# Patient Record
Sex: MALE | Race: WHITE | NOT HISPANIC OR LATINO | Employment: OTHER | URBAN - METROPOLITAN AREA
[De-identification: names, ages, dates, MRNs, and addresses within clinical notes are randomized per-mention and may not be internally consistent; named-entity substitution may affect disease eponyms.]

---

## 2017-01-23 RX ORDER — TAMSULOSIN HYDROCHLORIDE 0.4 MG/1
0.4 CAPSULE ORAL DAILY
Qty: 90 CAPSULE | Refills: 3 | Status: SHIPPED | OUTPATIENT
Start: 2017-01-23 | End: 2017-10-18 | Stop reason: SDUPTHER

## 2017-01-23 NOTE — TELEPHONE ENCOUNTER
----- Message from Ava Jorge sent at 1/23/2017  9:24 AM CST -----  Contact: patient  Patient was here this morning and wanted to ask Dr. Quintanilla to write him a prescription for Flomax good for one year.  He's going to send it to his mail order pharmacy.    Please call him when the prescription is ready to  at [phone].      Thank you.

## 2017-01-23 NOTE — TELEPHONE ENCOUNTER
The pt has an appointment on 02/09/17. Please sign order. The pt is requesting we print the prescription and let him know when it is ready. Please sign order.

## 2017-03-10 ENCOUNTER — LAB VISIT (OUTPATIENT)
Dept: LAB | Facility: HOSPITAL | Age: 72
End: 2017-03-10
Attending: UROLOGY
Payer: MEDICARE

## 2017-03-10 DIAGNOSIS — R97.20 ELEVATED PROSTATE SPECIFIC ANTIGEN (PSA): ICD-10-CM

## 2017-03-10 LAB — COMPLEXED PSA SERPL-MCNC: 5.2 NG/ML

## 2017-03-10 PROCEDURE — 84153 ASSAY OF PSA TOTAL: CPT

## 2017-03-10 PROCEDURE — 36415 COLL VENOUS BLD VENIPUNCTURE: CPT | Mod: PO

## 2017-03-17 ENCOUNTER — OFFICE VISIT (OUTPATIENT)
Dept: UROLOGY | Facility: CLINIC | Age: 72
End: 2017-03-17
Payer: MEDICARE

## 2017-03-17 ENCOUNTER — APPOINTMENT (OUTPATIENT)
Dept: LAB | Facility: HOSPITAL | Age: 72
End: 2017-03-17
Attending: UROLOGY
Payer: MEDICARE

## 2017-03-17 VITALS
HEART RATE: 61 BPM | BODY MASS INDEX: 34.08 KG/M2 | SYSTOLIC BLOOD PRESSURE: 147 MMHG | TEMPERATURE: 98 F | RESPIRATION RATE: 18 BRPM | HEIGHT: 68 IN | DIASTOLIC BLOOD PRESSURE: 81 MMHG | WEIGHT: 224.88 LBS

## 2017-03-17 DIAGNOSIS — N40.0 BENIGN NON-NODULAR PROSTATIC HYPERPLASIA, PRESENCE OF LOWER URINARY TRACT SYMPTOMS UNSPECIFIED: ICD-10-CM

## 2017-03-17 DIAGNOSIS — R82.81 PYURIA: ICD-10-CM

## 2017-03-17 DIAGNOSIS — R31.29 MICROSCOPIC HEMATURIA: Primary | ICD-10-CM

## 2017-03-17 DIAGNOSIS — R97.20 ELEVATED PROSTATE SPECIFIC ANTIGEN (PSA): ICD-10-CM

## 2017-03-17 LAB
BILIRUB SERPL-MCNC: ABNORMAL MG/DL
BLOOD URINE, POC: ABNORMAL
COLOR, POC UA: YELLOW
GLUCOSE UR QL STRIP: ABNORMAL
KETONES UR QL STRIP: ABNORMAL
LEUKOCYTE ESTERASE URINE, POC: ABNORMAL
NITRITE, POC UA: ABNORMAL
PH, POC UA: 7
PROTEIN, POC: ABNORMAL
SPECIFIC GRAVITY, POC UA: 1
UROBILINOGEN, POC UA: ABNORMAL

## 2017-03-17 PROCEDURE — 81002 URINALYSIS NONAUTO W/O SCOPE: CPT | Mod: PBBFAC,PO | Performed by: UROLOGY

## 2017-03-17 PROCEDURE — 99999 PR PBB SHADOW E&M-EST. PATIENT-LVL III: CPT | Mod: PBBFAC,,, | Performed by: UROLOGY

## 2017-03-17 PROCEDURE — 99214 OFFICE O/P EST MOD 30 MIN: CPT | Mod: 25,S$PBB,, | Performed by: UROLOGY

## 2017-03-17 PROCEDURE — 87088 URINE BACTERIA CULTURE: CPT

## 2017-03-17 PROCEDURE — 88112 CYTOPATH CELL ENHANCE TECH: CPT | Performed by: PATHOLOGY

## 2017-03-17 PROCEDURE — 87086 URINE CULTURE/COLONY COUNT: CPT

## 2017-03-17 PROCEDURE — 87077 CULTURE AEROBIC IDENTIFY: CPT

## 2017-03-17 PROCEDURE — 81000 URINALYSIS NONAUTO W/SCOPE: CPT | Mod: PBBFAC,PO | Performed by: UROLOGY

## 2017-03-17 PROCEDURE — 88112 CYTOPATH CELL ENHANCE TECH: CPT | Mod: 26,,, | Performed by: PATHOLOGY

## 2017-03-17 PROCEDURE — 87186 SC STD MICRODIL/AGAR DIL: CPT

## 2017-03-17 PROCEDURE — 99213 OFFICE O/P EST LOW 20 MIN: CPT | Mod: PBBFAC,PO | Performed by: UROLOGY

## 2017-03-17 NOTE — PROGRESS NOTES
OFFICE NOTE    CHIEF COMPLAINT:  Elevated PSA, BPH.    HISTORY OF PRESENT ILLNESS:  This 71-year-old male returns for routine recheck.    He has a history of elevated PSA for which he had undergone a prostate   ultrasound and biopsy in [place] in 2002 where he was living at the time and   pathology report had revealed no evidence of any malignancy.  He has continued   to have elevated PSAs, but the patient does not want to undergo any further   prostate ultrasound and biopsies at this time.  His most recent PSA in fact was   stable at 5.2 on 03/10/2017 compared to 6.0 on 07/08/2016.  The patient also is   being treated for BPH with Flomax 0.4 mg p.o. daily with which he has been doing   well.  He did mention that he will be going on a trip next month and would like to take   some antibiotics with him in case he develops an infection. Otherwise, no other   change in his  history since his last visit of 08/17/2016.    MEDICAL HISTORY UPDATE:  Reveals that he underwent a colonoscopy six weeks ago   and the only finding was a benign polyp. He continues to be on Plavix.    PHYSICAL EXAMINATION:  ABDOMEN:  Soft, benign and nontender.  No masses.  No hernias or organomegaly.  EXTERNAL GENITALIA:  Normal phallus with adequate meatus.  Testes descended and   feel normal.  No scrotal masses.  RECTAL:  Reveals enlarged at least 40 g, smooth prostate.  No nodules.  Normal   sphincter tone.    UA did reveal 2+ leukocytes with 10 to 15 wbc's and trace of blood with 4 to 6   rbc's and pH 7.0.    His last PSA was 5.2 on 03/10/2017.    FINAL IMPRESSION:  Elevated PSA, BPH, pyuria, microscopic hematuria.    RECOMMENDATIONS:  Urine for C and S and cytology and subsequent treat with   appropriate antibiotic if infected and we will also give a prescription for   antibiotic to take with him on his trip.  Otherwise, continue on Flomax 0.4 mg   p.o. daily and will also plan on repeating the PSA in six months.      MD/ALEYDA  dd: 03/17/2017  13:07:19 (CDT)  td: 03/17/2017 19:24:05 (CHE)  Doc ID   #[id number]  Job ID #[id number]    CC:

## 2017-03-21 LAB — BACTERIA UR CULT: NORMAL

## 2017-03-23 RX ORDER — AMOXICILLIN AND CLAVULANATE POTASSIUM 875; 125 MG/1; MG/1
1 TABLET, FILM COATED ORAL 2 TIMES DAILY
Qty: 28 TABLET | Refills: 0 | Status: SHIPPED | OUTPATIENT
Start: 2017-03-23 | End: 2017-04-06

## 2017-03-23 RX ORDER — CIPROFLOXACIN 500 MG/1
500 TABLET ORAL 2 TIMES DAILY
Qty: 28 TABLET | Refills: 0 | Status: CANCELLED | OUTPATIENT
Start: 2017-03-23 | End: 2017-04-06

## 2017-08-28 ENCOUNTER — TELEPHONE (OUTPATIENT)
Dept: UROLOGY | Facility: CLINIC | Age: 72
End: 2017-08-28

## 2017-08-28 DIAGNOSIS — R97.20 ELEVATED PSA: Primary | ICD-10-CM

## 2017-08-28 NOTE — TELEPHONE ENCOUNTER
----- Message from Aga Sim sent at 8/28/2017  9:22 AM CDT -----  Contact: Self  Patient scheduled 6m f/u for 10/18/17 - would like to get PSA done prior to appointment - please call patient at [phone] to schedule

## 2017-08-28 NOTE — TELEPHONE ENCOUNTER
Call placed and spoke to patient's wife, stated that the patient is coming to the office to make his follow up appt. Informed her that the appt has been made and his PSA orders are in and he can do them at least the week before the appt. She will give him the message, she verbally understood.

## 2017-10-11 ENCOUNTER — LAB VISIT (OUTPATIENT)
Dept: LAB | Facility: HOSPITAL | Age: 72
End: 2017-10-11
Attending: UROLOGY
Payer: MEDICARE

## 2017-10-11 DIAGNOSIS — R97.20 ELEVATED PSA: ICD-10-CM

## 2017-10-11 LAB — COMPLEXED PSA SERPL-MCNC: 5.5 NG/ML

## 2017-10-11 PROCEDURE — 36415 COLL VENOUS BLD VENIPUNCTURE: CPT | Mod: PO

## 2017-10-11 PROCEDURE — 84153 ASSAY OF PSA TOTAL: CPT

## 2017-10-18 ENCOUNTER — APPOINTMENT (OUTPATIENT)
Dept: LAB | Facility: HOSPITAL | Age: 72
End: 2017-10-18
Attending: UROLOGY
Payer: MEDICARE

## 2017-10-18 ENCOUNTER — OFFICE VISIT (OUTPATIENT)
Dept: UROLOGY | Facility: CLINIC | Age: 72
End: 2017-10-18
Payer: MEDICARE

## 2017-10-18 VITALS
SYSTOLIC BLOOD PRESSURE: 157 MMHG | HEART RATE: 68 BPM | RESPIRATION RATE: 18 BRPM | HEIGHT: 68 IN | TEMPERATURE: 98 F | DIASTOLIC BLOOD PRESSURE: 95 MMHG | BODY MASS INDEX: 34.17 KG/M2 | WEIGHT: 225.5 LBS

## 2017-10-18 DIAGNOSIS — R31.29 MICROSCOPIC HEMATURIA: Primary | ICD-10-CM

## 2017-10-18 DIAGNOSIS — R31.0 GROSS HEMATURIA: ICD-10-CM

## 2017-10-18 DIAGNOSIS — R97.20 ELEVATED PROSTATE SPECIFIC ANTIGEN (PSA): ICD-10-CM

## 2017-10-18 DIAGNOSIS — N40.0 BENIGN PROSTATIC HYPERPLASIA WITHOUT LOWER URINARY TRACT SYMPTOMS: ICD-10-CM

## 2017-10-18 LAB
BILIRUB SERPL-MCNC: ABNORMAL MG/DL
BLOOD URINE, POC: ABNORMAL
COLOR, POC UA: YELLOW
GLUCOSE UR QL STRIP: ABNORMAL
KETONES UR QL STRIP: ABNORMAL
LEUKOCYTE ESTERASE URINE, POC: ABNORMAL
NITRITE, POC UA: ABNORMAL
PH, POC UA: 5
PROTEIN, POC: ABNORMAL
SPECIFIC GRAVITY, POC UA: 1.01
UROBILINOGEN, POC UA: ABNORMAL

## 2017-10-18 PROCEDURE — 81002 URINALYSIS NONAUTO W/O SCOPE: CPT | Mod: PBBFAC,PN | Performed by: UROLOGY

## 2017-10-18 PROCEDURE — 87088 URINE BACTERIA CULTURE: CPT

## 2017-10-18 PROCEDURE — 88112 CYTOPATH CELL ENHANCE TECH: CPT | Mod: 26,,, | Performed by: PATHOLOGY

## 2017-10-18 PROCEDURE — 99999 PR PBB SHADOW E&M-EST. PATIENT-LVL III: CPT | Mod: PBBFAC,,, | Performed by: UROLOGY

## 2017-10-18 PROCEDURE — 87086 URINE CULTURE/COLONY COUNT: CPT

## 2017-10-18 PROCEDURE — 99214 OFFICE O/P EST MOD 30 MIN: CPT | Mod: 25,S$PBB,, | Performed by: UROLOGY

## 2017-10-18 PROCEDURE — 87077 CULTURE AEROBIC IDENTIFY: CPT

## 2017-10-18 PROCEDURE — 81000 URINALYSIS NONAUTO W/SCOPE: CPT | Mod: PBBFAC,PN | Performed by: UROLOGY

## 2017-10-18 PROCEDURE — 88112 CYTOPATH CELL ENHANCE TECH: CPT | Performed by: PATHOLOGY

## 2017-10-18 PROCEDURE — 99213 OFFICE O/P EST LOW 20 MIN: CPT | Mod: PBBFAC,PN,25 | Performed by: UROLOGY

## 2017-10-18 PROCEDURE — 87186 SC STD MICRODIL/AGAR DIL: CPT

## 2017-10-18 RX ORDER — TAMSULOSIN HYDROCHLORIDE 0.4 MG/1
0.4 CAPSULE ORAL DAILY
Qty: 90 CAPSULE | Refills: 3 | Status: SHIPPED | OUTPATIENT
Start: 2017-10-18 | End: 2018-10-18

## 2017-10-18 NOTE — PROGRESS NOTES
OFFICE NOTE    CHIEF COMPLAINT:  Gross hematuria, elevated PSA, BPH.    HISTORY OF PRESENT ILLNESS:  This 72-year-old male presents with an   approximately 3-week history of experiencing episodes of gross hematuria with no   other associated symptoms.  He states that the gross hematuria has resolved   approximately a week ago.  He also has a history of elevated PSA for which he   had undergone ultrasound with biopsy in [place] in 2002 where he was living at   the time.  The PSA continued to be elevated, but they are more at a stable   level, and he had decided at his last visit that he does not want to undergo any   further prostate biopsies.  His most recent PSA was 5.5 on 10/11/2017 that   compares to 5.2 on 03/10/2017.  He also has a history of BPH for which he   continues to take Flomax 0.4 mg p.o. daily.  In terms of gross hematuria, we   discussed with the patient that he will probably require further evaluation of   both of the upper tract and cystoscopy.  He had undergone a CT scan of abdomen   and pelvis in November 2014, which revealed as the only finding was a left renal   cyst and no other significant findings.  It was mentioned to the patient that   we may need to repeat this again.    MEDICAL HISTORY UPDATE:  Reveals that he continues to be on Plavix.    PHYSICAL EXAMINATION:  ABDOMEN:  Soft, benign, and nontender.  No masses.  No hernias or organomegaly.  EXTERNAL GENITALIA:  Normal phallus with adequate meatus.  Testes descended and   feel normal.  No scrotal masses.  RECTAL:  Reveals enlarged, at least approximately 40 g smooth prostate.  No   nodules.  Normal sphincter tone.    UA does reveal 1+ blood, many rbc's and also many wbc's, pH 5.0.    FINAL IMPRESSION:  Gross microscopic hematuria, pyuria, elevated PSA, BPH.    RECOMMENDATIONS:  Urine for C and S and cytology.  We will treat with   appropriate antibiotic if infected.  Otherwise depending on the urine cytology,   we will proceed with the  CT scan and subsequent cystoscopy.  Otherwise, he will   continue on Flomax 0.4 mg p.o. daily.      CORIE  dd: 10/18/2017 10:47:48 (CDT)  td: 10/18/2017 23:50:39 (CDT)  Doc ID   #[id number]  Job ID #[id number]    CC:

## 2017-10-21 LAB — BACTERIA UR CULT: NORMAL

## 2017-10-22 RX ORDER — AMPICILLIN 500 MG/1
500 CAPSULE ORAL 4 TIMES DAILY
Qty: 56 CAPSULE | Refills: 0 | Status: SHIPPED | OUTPATIENT
Start: 2017-10-22 | End: 2019-08-23 | Stop reason: ALTCHOICE

## 2018-09-04 ENCOUNTER — LAB VISIT (OUTPATIENT)
Dept: LAB | Facility: HOSPITAL | Age: 73
End: 2018-09-04
Attending: UROLOGY
Payer: MEDICARE

## 2018-09-04 DIAGNOSIS — R97.20 ELEVATED PROSTATE SPECIFIC ANTIGEN (PSA): ICD-10-CM

## 2018-09-04 LAB — COMPLEXED PSA SERPL-MCNC: 5.3 NG/ML

## 2018-09-04 PROCEDURE — 36415 COLL VENOUS BLD VENIPUNCTURE: CPT | Mod: PO

## 2018-09-04 PROCEDURE — 84153 ASSAY OF PSA TOTAL: CPT

## 2018-09-07 ENCOUNTER — OFFICE VISIT (OUTPATIENT)
Dept: UROLOGY | Facility: CLINIC | Age: 73
End: 2018-09-07
Payer: MEDICARE

## 2018-09-07 VITALS
DIASTOLIC BLOOD PRESSURE: 93 MMHG | WEIGHT: 223.31 LBS | SYSTOLIC BLOOD PRESSURE: 152 MMHG | HEIGHT: 67 IN | HEART RATE: 55 BPM | BODY MASS INDEX: 35.05 KG/M2

## 2018-09-07 DIAGNOSIS — R97.20 ELEVATED PSA: ICD-10-CM

## 2018-09-07 DIAGNOSIS — R35.0 URINARY FREQUENCY: Primary | ICD-10-CM

## 2018-09-07 DIAGNOSIS — N40.0 BENIGN PROSTATIC HYPERPLASIA WITHOUT LOWER URINARY TRACT SYMPTOMS: ICD-10-CM

## 2018-09-07 DIAGNOSIS — R31.0 GROSS HEMATURIA: ICD-10-CM

## 2018-09-07 LAB
BILIRUB SERPL-MCNC: ABNORMAL MG/DL
BLOOD URINE, POC: ABNORMAL
COLOR, POC UA: ABNORMAL
GLUCOSE UR QL STRIP: ABNORMAL
KETONES UR QL STRIP: ABNORMAL
LEUKOCYTE ESTERASE URINE, POC: ABNORMAL
NITRITE, POC UA: ABNORMAL
PH, POC UA: 6
PROTEIN, POC: ABNORMAL
SPECIFIC GRAVITY, POC UA: 1.01
UROBILINOGEN, POC UA: ABNORMAL

## 2018-09-07 PROCEDURE — 81000 URINALYSIS NONAUTO W/SCOPE: CPT | Mod: PBBFAC,PN | Performed by: UROLOGY

## 2018-09-07 PROCEDURE — 99999 PR PBB SHADOW E&M-EST. PATIENT-LVL III: CPT | Mod: PBBFAC,,, | Performed by: UROLOGY

## 2018-09-07 PROCEDURE — 99214 OFFICE O/P EST MOD 30 MIN: CPT | Mod: S$PBB,,, | Performed by: UROLOGY

## 2018-09-07 PROCEDURE — 81002 URINALYSIS NONAUTO W/O SCOPE: CPT | Mod: PBBFAC,PN | Performed by: UROLOGY

## 2018-09-07 PROCEDURE — 99213 OFFICE O/P EST LOW 20 MIN: CPT | Mod: PBBFAC,PN | Performed by: UROLOGY

## 2018-09-07 RX ORDER — ZOSTER VACCINE RECOMBINANT, ADJUVANTED 50 MCG/0.5
KIT INTRAMUSCULAR
Refills: 0 | COMMUNITY
Start: 2018-08-28 | End: 2021-05-06 | Stop reason: ALTCHOICE

## 2018-09-07 RX ORDER — TAMSULOSIN HYDROCHLORIDE 0.4 MG/1
0.4 CAPSULE ORAL DAILY
Qty: 90 CAPSULE | Refills: 3 | Status: SHIPPED | OUTPATIENT
Start: 2018-09-07 | End: 2018-12-02 | Stop reason: SDUPTHER

## 2018-09-07 NOTE — PROGRESS NOTES
OFFICE NOTE    CHIEF COMPLAINT:  History of hematuria, elevated PSA, and BPH.    HISTORY OF PRESENT ILLNESS:  This 73-year-old male returns for routine recheck.    He has a history of hematuria for which he underwent a workup that included CT   scan in 2014 and cystoscopy in 2015, which revealed no significant abnormal   findings only that of some BPH.  He also has history of elevated PSA for which   he underwent prostate biopsy in 2002.  He has been noted to have continued   abnormal PSA since then, but he had stated on prior visits and also on today's   visit that he does not want to undergo any more prostate biopsies.  He also has   a history of BPH for which he continues to take Flomax 0.4 mg p.o. daily, which   continues to be effective, of which he needs a refill.    MEDICAL HISTORY UPDATE:  Reveals no other change in general health.  He   continues to be on Plavix.    PHYSICAL EXAMINATION:  ABDOMEN:  Soft, benign, and nontender.  No masses.  No hernias, no organomegaly.  EXTERNAL GENITALIA:  Normal phallus with adequate meatus.  Testes descended and   feel normal.  No scrotal masses.  RECTAL:  Reveals a large, approximately 40 g smooth prostate.  No nodules.    Normal sphincter tone.    UA negative with pH 6.0.    His last PSA was 5.3 on 09/04/2018.    FINAL IMPRESSION:  History of hematuria, elevated prostate-specific antigen, and   benign prostatic hypertrophy.    RECOMMENDATIONS:  Continue on Flomax 0.4 mg p.o. daily.  Otherwise, recheck in   six months.      CORIE  dd: 09/07/2018 15:37:09 (CDT)  td: 09/08/2018 08:27:24 (CHE)  Doc ID   #[id number]  Job ID #[id number]    CC:

## 2018-12-02 RX ORDER — TAMSULOSIN HYDROCHLORIDE 0.4 MG/1
CAPSULE ORAL
Qty: 90 CAPSULE | Refills: 0 | Status: SHIPPED | OUTPATIENT
Start: 2018-12-02 | End: 2019-11-04 | Stop reason: SDUPTHER

## 2019-07-05 ENCOUNTER — CLINICAL SUPPORT (OUTPATIENT)
Dept: UROLOGY | Facility: CLINIC | Age: 74
End: 2019-07-05
Payer: MEDICARE

## 2019-07-05 ENCOUNTER — TELEPHONE (OUTPATIENT)
Dept: UROLOGY | Facility: CLINIC | Age: 74
End: 2019-07-05

## 2019-07-05 ENCOUNTER — APPOINTMENT (OUTPATIENT)
Dept: LAB | Facility: HOSPITAL | Age: 74
End: 2019-07-05
Attending: NURSE PRACTITIONER
Payer: MEDICARE

## 2019-07-05 DIAGNOSIS — R31.0 GROSS HEMATURIA: ICD-10-CM

## 2019-07-05 PROCEDURE — 88112 CYTOLOGY SPECIMEN-URINE: ICD-10-PCS | Mod: 26,,, | Performed by: PATHOLOGY

## 2019-07-05 PROCEDURE — 87086 URINE CULTURE/COLONY COUNT: CPT

## 2019-07-05 PROCEDURE — 88112 CYTOPATH CELL ENHANCE TECH: CPT | Performed by: PATHOLOGY

## 2019-07-05 PROCEDURE — 99499 NO LOS: ICD-10-PCS | Mod: S$PBB,,, | Performed by: NURSE PRACTITIONER

## 2019-07-05 PROCEDURE — 99499 UNLISTED E&M SERVICE: CPT | Mod: S$PBB,,, | Performed by: NURSE PRACTITIONER

## 2019-07-05 PROCEDURE — 88112 CYTOPATH CELL ENHANCE TECH: CPT | Mod: 26,,, | Performed by: PATHOLOGY

## 2019-07-05 RX ORDER — CIPROFLOXACIN 500 MG/1
500 TABLET ORAL 2 TIMES DAILY
Qty: 28 TABLET | Refills: 0 | Status: SHIPPED | OUTPATIENT
Start: 2019-07-05 | End: 2019-07-19

## 2019-07-05 RX ORDER — PHENAZOPYRIDINE HYDROCHLORIDE 200 MG/1
200 TABLET, FILM COATED ORAL 3 TIMES DAILY PRN
Qty: 30 TABLET | Refills: 1 | Status: SHIPPED | OUTPATIENT
Start: 2019-07-05 | End: 2019-07-05 | Stop reason: SDUPTHER

## 2019-07-05 RX ORDER — PHENAZOPYRIDINE HYDROCHLORIDE 200 MG/1
200 TABLET, FILM COATED ORAL 3 TIMES DAILY PRN
Qty: 30 TABLET | Refills: 1 | Status: SHIPPED | OUTPATIENT
Start: 2019-07-05 | End: 2019-07-15

## 2019-07-05 RX ORDER — CIPROFLOXACIN 500 MG/1
500 TABLET ORAL 2 TIMES DAILY
Qty: 28 TABLET | Refills: 0 | Status: SHIPPED | OUTPATIENT
Start: 2019-07-05 | End: 2019-07-05 | Stop reason: SDUPTHER

## 2019-07-05 NOTE — PROGRESS NOTES
Per betty Monroy patient in clinic today to give a urine sample. Urine sent for urine culture and urine cytology.

## 2019-07-05 NOTE — TELEPHONE ENCOUNTER
Pt called our office this afternoon c/o dysuria, bladder pain and gross hematuria.    Pt will come into clinic today to drop off urine for   Urine culture  Urine Cytology    CT Urogram with and without contrast to be scheduled as well as an appt. With Dr. Quintanilla for further workup.    Cipro and Pyridium were sent to his pharmacy to begin today.

## 2019-07-05 NOTE — TELEPHONE ENCOUNTER
----- Message from Amada Gibson sent at 7/5/2019 12:54 PM CDT -----  Contact: self  Type: Needs Medical Advice    Who Called:  self  Symptoms (please be specific):  Pain and blood when urinating  How long has patient had these symptoms: today  Pharmacy name and phone #:  [facility]  Best Call Back Number: [phone] (home)   Additional Information: patient would like a prescription called in. Please call patient to advise. Thanks!   [id number] - [place] - [address]  [facility]  [address]  [place] [address]  Phone: [phone] Fax: [phone]

## 2019-07-05 NOTE — TELEPHONE ENCOUNTER
Spoke with patient he will come by the office today to give a urine sample. Ct scheduled for Tuesday 7/9 at 9:30am. Please call patient to schedule follow up with

## 2019-07-07 LAB
BACTERIA UR CULT: NORMAL
BACTERIA UR CULT: NORMAL

## 2019-07-09 ENCOUNTER — HOSPITAL ENCOUNTER (OUTPATIENT)
Dept: RADIOLOGY | Facility: HOSPITAL | Age: 74
Discharge: HOME | End: 2019-07-09
Attending: NURSE PRACTITIONER
Payer: MEDICARE

## 2019-07-09 DIAGNOSIS — R31.0 GROSS HEMATURIA: ICD-10-CM

## 2019-07-09 PROCEDURE — 74178 CT UROGRAM ABD PELVIS W WO: ICD-10-PCS | Mod: 26,,, | Performed by: RADIOLOGY

## 2019-07-09 PROCEDURE — 25500020 PHARM REV CODE 255: Performed by: NURSE PRACTITIONER

## 2019-07-09 PROCEDURE — 74178 CT ABD&PLV WO CNTR FLWD CNTR: CPT | Mod: 26,,, | Performed by: RADIOLOGY

## 2019-07-09 PROCEDURE — 74178 CT ABD&PLV WO CNTR FLWD CNTR: CPT | Mod: TC

## 2019-07-09 RX ADMIN — IOHEXOL 125 ML: 350 INJECTION, SOLUTION INTRAVENOUS at 11:07

## 2019-07-10 ENCOUNTER — TELEPHONE (OUTPATIENT)
Dept: UROLOGY | Facility: CLINIC | Age: 74
End: 2019-07-10

## 2019-07-10 DIAGNOSIS — N40.0 BENIGN PROSTATIC HYPERPLASIA WITHOUT LOWER URINARY TRACT SYMPTOMS: Primary | ICD-10-CM

## 2019-07-10 NOTE — TELEPHONE ENCOUNTER
----- Message from SABINA Rowan sent at 7/9/2019  2:04 PM CDT -----  Pt to be scheduled with Dr. Quintanilla for further treatment with cysto, trus with bx's: Enlarged prostate with bladder outlet obstruction.  Pt walked into clinic last week with gross hematuria.

## 2019-07-10 NOTE — TELEPHONE ENCOUNTER
Spoke with patient, CT results given with recommendation, patient states he has been having an enlarged prostate for over 20 years. He just would like a follow up appt to see the MD with PSA prior to discuss. appt made, and informed to do lab work the week before appt, patient verbally understood.

## 2019-08-19 ENCOUNTER — LAB VISIT (OUTPATIENT)
Dept: LAB | Facility: HOSPITAL | Age: 74
End: 2019-08-19
Attending: UROLOGY
Payer: MEDICARE

## 2019-08-19 DIAGNOSIS — N40.0 BENIGN PROSTATIC HYPERPLASIA WITHOUT LOWER URINARY TRACT SYMPTOMS: ICD-10-CM

## 2019-08-19 LAB — COMPLEXED PSA SERPL-MCNC: 4.7 NG/ML (ref 0–4)

## 2019-08-19 PROCEDURE — 84153 ASSAY OF PSA TOTAL: CPT

## 2019-08-19 PROCEDURE — 36415 COLL VENOUS BLD VENIPUNCTURE: CPT

## 2019-08-23 ENCOUNTER — OFFICE VISIT (OUTPATIENT)
Dept: UROLOGY | Facility: CLINIC | Age: 74
End: 2019-08-23
Payer: MEDICARE

## 2019-08-23 VITALS
DIASTOLIC BLOOD PRESSURE: 85 MMHG | HEART RATE: 62 BPM | RESPIRATION RATE: 18 BRPM | SYSTOLIC BLOOD PRESSURE: 154 MMHG | BODY MASS INDEX: 35.47 KG/M2 | TEMPERATURE: 98 F | WEIGHT: 226 LBS | HEIGHT: 67 IN

## 2019-08-23 DIAGNOSIS — R31.29 MICROSCOPIC HEMATURIA: Primary | ICD-10-CM

## 2019-08-23 DIAGNOSIS — N40.0 BENIGN PROSTATIC HYPERPLASIA WITHOUT LOWER URINARY TRACT SYMPTOMS: ICD-10-CM

## 2019-08-23 DIAGNOSIS — R97.20 ELEVATED PSA: ICD-10-CM

## 2019-08-23 LAB
BILIRUB SERPL-MCNC: ABNORMAL MG/DL
BLOOD URINE, POC: ABNORMAL
COLOR, POC UA: YELLOW
GLUCOSE UR QL STRIP: ABNORMAL
KETONES UR QL STRIP: ABNORMAL
LEUKOCYTE ESTERASE URINE, POC: ABNORMAL
NITRITE, POC UA: ABNORMAL
PH, POC UA: 7.5
PROTEIN, POC: ABNORMAL
SPECIFIC GRAVITY, POC UA: 1.01
UROBILINOGEN, POC UA: ABNORMAL

## 2019-08-23 PROCEDURE — 99999 PR PBB SHADOW E&M-EST. PATIENT-LVL III: ICD-10-PCS | Mod: PBBFAC,,, | Performed by: UROLOGY

## 2019-08-23 PROCEDURE — 99999 PR PBB SHADOW E&M-EST. PATIENT-LVL III: CPT | Mod: PBBFAC,,, | Performed by: UROLOGY

## 2019-08-23 PROCEDURE — 81002 URINALYSIS NONAUTO W/O SCOPE: CPT | Mod: PBBFAC,PN | Performed by: UROLOGY

## 2019-08-23 PROCEDURE — 99214 PR OFFICE/OUTPT VISIT, EST, LEVL IV, 30-39 MIN: ICD-10-PCS | Mod: 25,S$PBB,, | Performed by: UROLOGY

## 2019-08-23 PROCEDURE — 99213 OFFICE O/P EST LOW 20 MIN: CPT | Mod: PBBFAC,PN | Performed by: UROLOGY

## 2019-08-23 PROCEDURE — 81000 URINALYSIS NONAUTO W/SCOPE: CPT | Mod: PBBFAC,PN | Performed by: UROLOGY

## 2019-08-23 PROCEDURE — 99214 OFFICE O/P EST MOD 30 MIN: CPT | Mod: 25,S$PBB,, | Performed by: UROLOGY

## 2019-08-23 NOTE — PROGRESS NOTES
OFFICE NOTE  [unfilled]  [id number]  8/23/2019       CHIEF COMPLAINT:   History of hematuria, elevated PSA, BPH     HISTORY OF PRESENT ILLNESS:   This 74-year-old male returns for recheck.  He has a history of hematuria for which he underwent a workup that included a CT scan in 2014 and cystoscopy in 2015 which revealed no significant abnormalities other than some BPH.  He also has a history of elevated PSA for which he underwent prostate ultrasound biopsy in 2002 in [place] where he was living at the time.  He has continued to have abnormal PSA levels that have been fluctuating but he had stated on prior visit that he did not  want to have any further prostate biopsies.  He continues to take Flomax 0.4 g p.o. q.d. for management of his BPH.  He did undergo a CT scan on 07/09/2019 for evaluation of hematuria which revealed enlarged prostate and a simple appearing left renal cyst which has decreased in size compared to the prior studies and some cortical scarring of the right kidney and no obvious source for hematuria.    Medical history  reveals no change in his general health since his last visit of 09/07/2018.  He does continue to take Plavix.    Physical exam:  Abdomen - soft benign nontender no masses no hernias no organomegaly                             External genitalia - normal phallus with adequate meatus testes descended and feel normal no scrotal masses                             Rectal - enlarged 40 g smooth prostate no nodules normal sphincter tone     PSA - 4.7 on 08/19/2019 having come down from 5.3 on 09/04/2018     UA - trace of blood small amount of WBCs pH 7.5     FINAL IMPRESSION:  History hematuria, BPH, pyuria       RECOMMENDATIONS:  Urine for culture and cytology and will contact patient with results.  Continue on Flomax 0.4 mg p.o. Q.d..

## 2019-11-05 RX ORDER — TAMSULOSIN HYDROCHLORIDE 0.4 MG/1
CAPSULE ORAL
Qty: 90 CAPSULE | Refills: 0 | Status: SHIPPED | OUTPATIENT
Start: 2019-11-05 | End: 2019-11-05

## 2019-11-05 RX ORDER — TAMSULOSIN HYDROCHLORIDE 0.4 MG/1
1 CAPSULE ORAL DAILY
Qty: 90 CAPSULE | Refills: 3 | Status: SHIPPED | OUTPATIENT
Start: 2019-11-05 | End: 2020-11-10 | Stop reason: SDUPTHER

## 2020-10-05 DIAGNOSIS — R97.20 ELEVATED PSA: Primary | ICD-10-CM

## 2020-11-03 ENCOUNTER — LAB VISIT (OUTPATIENT)
Dept: LAB | Facility: HOSPITAL | Age: 75
End: 2020-11-03
Attending: UROLOGY
Payer: MEDICARE

## 2020-11-03 DIAGNOSIS — R97.20 ELEVATED PSA: ICD-10-CM

## 2020-11-03 LAB — COMPLEXED PSA SERPL-MCNC: 5.3 NG/ML (ref 0–4)

## 2020-11-03 PROCEDURE — 36415 COLL VENOUS BLD VENIPUNCTURE: CPT

## 2020-11-03 PROCEDURE — 84153 ASSAY OF PSA TOTAL: CPT

## 2020-11-10 ENCOUNTER — OFFICE VISIT (OUTPATIENT)
Dept: UROLOGY | Facility: CLINIC | Age: 75
End: 2020-11-10
Payer: MEDICARE

## 2020-11-10 VITALS
WEIGHT: 226 LBS | DIASTOLIC BLOOD PRESSURE: 86 MMHG | RESPIRATION RATE: 18 BRPM | HEART RATE: 66 BPM | BODY MASS INDEX: 35.47 KG/M2 | HEIGHT: 67 IN | TEMPERATURE: 98 F | SYSTOLIC BLOOD PRESSURE: 157 MMHG

## 2020-11-10 DIAGNOSIS — N40.0 BENIGN PROSTATIC HYPERPLASIA WITHOUT LOWER URINARY TRACT SYMPTOMS: ICD-10-CM

## 2020-11-10 DIAGNOSIS — R97.20 ELEVATED PSA: ICD-10-CM

## 2020-11-10 DIAGNOSIS — R31.29 MICROSCOPIC HEMATURIA: Primary | ICD-10-CM

## 2020-11-10 PROCEDURE — 87086 URINE CULTURE/COLONY COUNT: CPT

## 2020-11-10 PROCEDURE — 87186 SC STD MICRODIL/AGAR DIL: CPT

## 2020-11-10 PROCEDURE — 81000 URINALYSIS NONAUTO W/SCOPE: CPT | Mod: PBBFAC,PN | Performed by: UROLOGY

## 2020-11-10 PROCEDURE — 99215 OFFICE O/P EST HI 40 MIN: CPT | Mod: 25,S$PBB,, | Performed by: UROLOGY

## 2020-11-10 PROCEDURE — 87088 URINE BACTERIA CULTURE: CPT

## 2020-11-10 PROCEDURE — 99214 OFFICE O/P EST MOD 30 MIN: CPT | Mod: PBBFAC,PN,25 | Performed by: UROLOGY

## 2020-11-10 PROCEDURE — 88112 CYTOPATH CELL ENHANCE TECH: CPT | Performed by: PATHOLOGY

## 2020-11-10 PROCEDURE — 88112 CYTOPATH CELL ENHANCE TECH: CPT | Mod: 26,,, | Performed by: PATHOLOGY

## 2020-11-10 PROCEDURE — 99999 PR PBB SHADOW E&M-EST. PATIENT-LVL IV: CPT | Mod: PBBFAC,,, | Performed by: UROLOGY

## 2020-11-10 PROCEDURE — 87077 CULTURE AEROBIC IDENTIFY: CPT

## 2020-11-10 PROCEDURE — 88112 PR  CYTOPATH, CELL ENHANCE TECH: ICD-10-PCS | Mod: 26,,, | Performed by: PATHOLOGY

## 2020-11-10 PROCEDURE — 99999 PR PBB SHADOW E&M-EST. PATIENT-LVL IV: ICD-10-PCS | Mod: PBBFAC,,, | Performed by: UROLOGY

## 2020-11-10 PROCEDURE — 99215 PR OFFICE/OUTPT VISIT, EST, LEVL V, 40-54 MIN: ICD-10-PCS | Mod: 25,S$PBB,, | Performed by: UROLOGY

## 2020-11-10 RX ORDER — INFLUENZA A VIRUS A/MICHIGAN/45/2015 X-275 (H1N1) ANTIGEN (FORMALDEHYDE INACTIVATED), INFLUENZA A VIRUS A/SINGAPORE/INFIMH-16-0019/2016 IVR-186 (H3N2) ANTIGEN (FORMALDEHYDE INACTIVATED), INFLUENZA B VIRUS B/PHUKET/3073/2013 ANTIGEN (FORMALDEHYDE INACTIVATED), AND INFLUENZA B VIRUS B/MARYLAND/15/2016 BX-69A ANTIGEN (FORMALDEHYDE INACTIVATED) 60; 60; 60; 60 UG/.7ML; UG/.7ML; UG/.7ML; UG/.7ML
INJECTION, SUSPENSION INTRAMUSCULAR
COMMUNITY
Start: 2020-09-16 | End: 2021-05-06 | Stop reason: CLARIF

## 2020-11-10 RX ORDER — TAMSULOSIN HYDROCHLORIDE 0.4 MG/1
1 CAPSULE ORAL DAILY
Qty: 90 CAPSULE | Refills: 3 | Status: SHIPPED | OUTPATIENT
Start: 2020-11-10 | End: 2021-05-18 | Stop reason: SDUPTHER

## 2020-11-10 NOTE — PROGRESS NOTES
OFFICE NOTE  [unfilled]  [id number]  11/10/2020       CHIEF COMPLAINT:   history microscopic hematuria, elevated PSA, BPH     HISTORY OF PRESENT ILLNESS:   this 75-year-old male returns routine recheck.  Has history of hematuria for which he underwent a full workup that included a CT scan 2014 and again in on 07/09/2019 and cystoscopy 2015 with no significant  abnormal findings other than some BPH.  He also has a history elevated PSA for which he underwent a prostate ultrasound biopsy in 2002 in [place] where was living at the time.  He has continued to have elevated PSA that has been fluctuating and last one was 5.3 on 11/03/2020 but he has mentioned he does not want to undergo any further prostate biopsies.    Other changes in his general health    Physical exam:  Abdomen - soft benign nontender no masses no hernias no organomegaly                             External genitalia - normal phallus with adequate meatus testes descended and feel normal no scrotal mass                             Rectal -  enlarged 40 g smooth prostate no nodules normal sphincter tone         PSA  - 5.3 on 11/03/2020     UA - large amount of blood with pH 6.0     FINAL IMPRESSION:  Microscopic hematuria, elevated PSA, BPH       RECOMMENDATIONS:   urine for culture and cytology and will contact patient with results.  Continue on Flomax 0.4 mg p.o. q.d.

## 2020-11-12 LAB
BACTERIA UR CULT: ABNORMAL
FINAL PATHOLOGIC DIAGNOSIS: NORMAL

## 2020-11-13 ENCOUNTER — TELEPHONE (OUTPATIENT)
Dept: UROLOGY | Facility: CLINIC | Age: 75
End: 2020-11-13

## 2020-11-13 RX ORDER — NITROFURANTOIN 25; 75 MG/1; MG/1
100 CAPSULE ORAL 2 TIMES DAILY
Qty: 20 CAPSULE | Refills: 0 | Status: SHIPPED | OUTPATIENT
Start: 2020-11-13 | End: 2021-05-06 | Stop reason: CLARIF

## 2020-11-13 NOTE — TELEPHONE ENCOUNTER
----- Message from Mirella Gates sent at 11/13/2020 12:36 PM CST -----  Regarding: advice  Contact: pt  Patient called and asked if you will take the [facility] pharmacy off his chart he no longer   Uses that pharmacy   All his meds need to [id number] - [place], [place] - [address] AT  [facility];      Call back [phone]

## 2021-01-12 ENCOUNTER — OFFICE VISIT (OUTPATIENT)
Dept: UROLOGY | Facility: CLINIC | Age: 76
End: 2021-01-12
Payer: MEDICARE

## 2021-01-12 VITALS
BODY MASS INDEX: 35.47 KG/M2 | WEIGHT: 226 LBS | HEART RATE: 61 BPM | RESPIRATION RATE: 18 BRPM | SYSTOLIC BLOOD PRESSURE: 157 MMHG | HEIGHT: 67 IN | DIASTOLIC BLOOD PRESSURE: 92 MMHG

## 2021-01-12 DIAGNOSIS — R97.20 ELEVATED PSA: ICD-10-CM

## 2021-01-12 DIAGNOSIS — R31.29 MICROSCOPIC HEMATURIA: Primary | ICD-10-CM

## 2021-01-12 DIAGNOSIS — N40.0 BENIGN PROSTATIC HYPERPLASIA WITHOUT LOWER URINARY TRACT SYMPTOMS: ICD-10-CM

## 2021-01-12 PROCEDURE — 99999 PR PBB SHADOW E&M-EST. PATIENT-LVL III: ICD-10-PCS | Mod: PBBFAC,,, | Performed by: UROLOGY

## 2021-01-12 PROCEDURE — 87186 SC STD MICRODIL/AGAR DIL: CPT

## 2021-01-12 PROCEDURE — 99999 PR PBB SHADOW E&M-EST. PATIENT-LVL III: CPT | Mod: PBBFAC,,, | Performed by: UROLOGY

## 2021-01-12 PROCEDURE — 87077 CULTURE AEROBIC IDENTIFY: CPT

## 2021-01-12 PROCEDURE — 88112 PR  CYTOPATH, CELL ENHANCE TECH: ICD-10-PCS | Mod: 26,,, | Performed by: PATHOLOGY

## 2021-01-12 PROCEDURE — 88112 CYTOPATH CELL ENHANCE TECH: CPT | Mod: 26,,, | Performed by: PATHOLOGY

## 2021-01-12 PROCEDURE — 99214 PR OFFICE/OUTPT VISIT, EST, LEVL IV, 30-39 MIN: ICD-10-PCS | Mod: 25,S$PBB,, | Performed by: UROLOGY

## 2021-01-12 PROCEDURE — 99214 OFFICE O/P EST MOD 30 MIN: CPT | Mod: 25,S$PBB,, | Performed by: UROLOGY

## 2021-01-12 PROCEDURE — 81000 URINALYSIS NONAUTO W/SCOPE: CPT | Mod: PBBFAC,PN | Performed by: UROLOGY

## 2021-01-12 PROCEDURE — 87086 URINE CULTURE/COLONY COUNT: CPT

## 2021-01-12 PROCEDURE — 99213 OFFICE O/P EST LOW 20 MIN: CPT | Mod: PBBFAC,PN | Performed by: UROLOGY

## 2021-01-12 PROCEDURE — 87088 URINE BACTERIA CULTURE: CPT

## 2021-01-12 PROCEDURE — 88112 CYTOPATH CELL ENHANCE TECH: CPT | Performed by: PATHOLOGY

## 2021-01-14 LAB — FINAL PATHOLOGIC DIAGNOSIS: NORMAL

## 2021-01-15 ENCOUNTER — IMMUNIZATION (OUTPATIENT)
Dept: FAMILY MEDICINE | Facility: CLINIC | Age: 76
End: 2021-01-15
Payer: MEDICARE

## 2021-01-15 DIAGNOSIS — Z23 NEED FOR VACCINATION: Primary | ICD-10-CM

## 2021-01-15 LAB — BACTERIA UR CULT: ABNORMAL

## 2021-01-15 PROCEDURE — 0001A COVID-19, MRNA, LNP-S, PF, 30 MCG/0.3 ML DOSE VACCINE: ICD-10-PCS | Mod: CV19,,, | Performed by: FAMILY MEDICINE

## 2021-01-15 PROCEDURE — 91300 COVID-19, MRNA, LNP-S, PF, 30 MCG/0.3 ML DOSE VACCINE: ICD-10-PCS | Mod: ,,, | Performed by: FAMILY MEDICINE

## 2021-01-15 PROCEDURE — 91300 COVID-19, MRNA, LNP-S, PF, 30 MCG/0.3 ML DOSE VACCINE: CPT | Mod: ,,, | Performed by: FAMILY MEDICINE

## 2021-01-15 PROCEDURE — 0001A COVID-19, MRNA, LNP-S, PF, 30 MCG/0.3 ML DOSE VACCINE: CPT | Mod: CV19,,, | Performed by: FAMILY MEDICINE

## 2021-01-19 RX ORDER — NITROFURANTOIN 25; 75 MG/1; MG/1
100 CAPSULE ORAL 2 TIMES DAILY
Qty: 20 CAPSULE | Refills: 0 | Status: SHIPPED | OUTPATIENT
Start: 2021-01-19 | End: 2021-05-06 | Stop reason: CLARIF

## 2021-02-05 ENCOUNTER — IMMUNIZATION (OUTPATIENT)
Dept: FAMILY MEDICINE | Facility: CLINIC | Age: 76
End: 2021-02-05
Payer: MEDICARE

## 2021-02-05 DIAGNOSIS — Z23 NEED FOR VACCINATION: Primary | ICD-10-CM

## 2021-02-05 PROCEDURE — 0002A COVID-19, MRNA, LNP-S, PF, 30 MCG/0.3 ML DOSE VACCINE: ICD-10-PCS | Mod: CV19,,, | Performed by: FAMILY MEDICINE

## 2021-02-05 PROCEDURE — 91300 COVID-19, MRNA, LNP-S, PF, 30 MCG/0.3 ML DOSE VACCINE: ICD-10-PCS | Mod: ,,, | Performed by: FAMILY MEDICINE

## 2021-02-05 PROCEDURE — 91300 COVID-19, MRNA, LNP-S, PF, 30 MCG/0.3 ML DOSE VACCINE: CPT | Mod: ,,, | Performed by: FAMILY MEDICINE

## 2021-02-05 PROCEDURE — 0002A COVID-19, MRNA, LNP-S, PF, 30 MCG/0.3 ML DOSE VACCINE: CPT | Mod: CV19,,, | Performed by: FAMILY MEDICINE

## 2021-05-06 ENCOUNTER — ANESTHESIA EVENT (OUTPATIENT)
Dept: SURGERY | Facility: HOSPITAL | Age: 76
DRG: 329 | End: 2021-05-06
Payer: MEDICARE

## 2021-05-06 ENCOUNTER — ANESTHESIA (OUTPATIENT)
Dept: SURGERY | Facility: HOSPITAL | Age: 76
DRG: 329 | End: 2021-05-06
Payer: MEDICARE

## 2021-05-06 ENCOUNTER — HOSPITAL ENCOUNTER (INPATIENT)
Facility: HOSPITAL | Age: 76
LOS: 7 days | Discharge: HOME | DRG: 329 | End: 2021-05-13
Attending: EMERGENCY MEDICINE | Admitting: INTERNAL MEDICINE
Payer: MEDICARE

## 2021-05-06 DIAGNOSIS — K35.31 ACUTE APPENDICITIS WITH LOCALIZED PERITONITIS AND GANGRENE, WITHOUT PERFORATION OR ABSCESS: Primary | ICD-10-CM

## 2021-05-06 DIAGNOSIS — K37 APPENDICITIS, UNSPECIFIED APPENDICITIS TYPE: ICD-10-CM

## 2021-05-06 DIAGNOSIS — R07.9 CHEST PAIN: ICD-10-CM

## 2021-05-06 DIAGNOSIS — K35.30 ACUTE APPENDICITIS WITH LOCALIZED PERITONITIS, WITHOUT PERFORATION OR GANGRENE: ICD-10-CM

## 2021-05-06 DIAGNOSIS — I25.118 ATHEROSCLEROSIS OF NATIVE CORONARY ARTERY OF NATIVE HEART WITH STABLE ANGINA PECTORIS: ICD-10-CM

## 2021-05-06 DIAGNOSIS — K35.30 ACUTE APPENDICITIS WITH LOCALIZED PERITONITIS, WITHOUT PERFORATION, ABSCESS, OR GANGRENE: ICD-10-CM

## 2021-05-06 DIAGNOSIS — C18.1 CARCINOMA OF APPENDIX: ICD-10-CM

## 2021-05-06 PROBLEM — K35.32 ACUTE FULMINATING APPENDICITIS WITH PERFORATION AND PERITONITIS: Status: ACTIVE | Noted: 2021-05-06

## 2021-05-06 PROBLEM — K35.32 APPENDICITIS WITH PERFORATION: Status: ACTIVE | Noted: 2021-05-06

## 2021-05-06 LAB
ALBUMIN SERPL BCP-MCNC: 3.1 G/DL (ref 3.5–5.2)
ALP SERPL-CCNC: 53 U/L (ref 55–135)
ALT SERPL W/O P-5'-P-CCNC: 47 U/L (ref 10–44)
ANION GAP SERPL CALC-SCNC: 10 MMOL/L (ref 8–16)
AST SERPL-CCNC: 32 U/L (ref 10–40)
BACTERIA #/AREA URNS HPF: NEGATIVE /HPF
BASOPHILS # BLD AUTO: 0.04 K/UL (ref 0–0.2)
BASOPHILS NFR BLD: 0.3 % (ref 0–1.9)
BILIRUB SERPL-MCNC: 1.4 MG/DL (ref 0.1–1)
BILIRUB UR QL STRIP: NEGATIVE
BUN SERPL-MCNC: 16 MG/DL (ref 8–23)
CALCIUM SERPL-MCNC: 8 MG/DL (ref 8.7–10.5)
CHLORIDE SERPL-SCNC: 97 MMOL/L (ref 95–110)
CLARITY UR: CLEAR
CO2 SERPL-SCNC: 25 MMOL/L (ref 23–29)
COLOR UR: YELLOW
CREAT SERPL-MCNC: 0.9 MG/DL (ref 0.5–1.4)
DIFFERENTIAL METHOD: ABNORMAL
EOSINOPHIL # BLD AUTO: 0.1 K/UL (ref 0–0.5)
EOSINOPHIL NFR BLD: 0.4 % (ref 0–8)
ERYTHROCYTE [DISTWIDTH] IN BLOOD BY AUTOMATED COUNT: 12.4 % (ref 11.5–14.5)
EST. GFR  (AFRICAN AMERICAN): >60 ML/MIN/1.73 M^2
EST. GFR  (NON AFRICAN AMERICAN): >60 ML/MIN/1.73 M^2
GLUCOSE SERPL-MCNC: 141 MG/DL (ref 70–110)
GLUCOSE UR QL STRIP: NEGATIVE
HCT VFR BLD AUTO: 36.3 % (ref 40–54)
HGB BLD-MCNC: 12.5 G/DL (ref 14–18)
HGB UR QL STRIP: ABNORMAL
HYALINE CASTS #/AREA URNS LPF: 7 /LPF
IMM GRANULOCYTES # BLD AUTO: 0.07 K/UL (ref 0–0.04)
IMM GRANULOCYTES NFR BLD AUTO: 0.5 % (ref 0–0.5)
KETONES UR QL STRIP: NEGATIVE
LEUKOCYTE ESTERASE UR QL STRIP: ABNORMAL
LIPASE SERPL-CCNC: 25 U/L (ref 4–60)
LYMPHOCYTES # BLD AUTO: 1.6 K/UL (ref 1–4.8)
LYMPHOCYTES NFR BLD: 11.6 % (ref 18–48)
MCH RBC QN AUTO: 30.3 PG (ref 27–31)
MCHC RBC AUTO-ENTMCNC: 34.4 G/DL (ref 32–36)
MCV RBC AUTO: 88 FL (ref 82–98)
MICROSCOPIC COMMENT: ABNORMAL
MONOCYTES # BLD AUTO: 1.1 K/UL (ref 0.3–1)
MONOCYTES NFR BLD: 7.6 % (ref 4–15)
NEUTROPHILS # BLD AUTO: 11.3 K/UL (ref 1.8–7.7)
NEUTROPHILS NFR BLD: 79.6 % (ref 38–73)
NITRITE UR QL STRIP: NEGATIVE
NRBC BLD-RTO: 0 /100 WBC
PH UR STRIP: 6 [PH] (ref 5–8)
PLATELET # BLD AUTO: 237 K/UL (ref 150–450)
PMV BLD AUTO: 11 FL (ref 9.2–12.9)
POTASSIUM SERPL-SCNC: 3.7 MMOL/L (ref 3.5–5.1)
PROT SERPL-MCNC: 7 G/DL (ref 6–8.4)
PROT UR QL STRIP: ABNORMAL
RBC # BLD AUTO: 4.12 M/UL (ref 4.6–6.2)
RBC #/AREA URNS HPF: 7 /HPF (ref 0–4)
SARS-COV-2 RDRP RESP QL NAA+PROBE: NEGATIVE
SODIUM SERPL-SCNC: 132 MMOL/L (ref 136–145)
SP GR UR STRIP: 1.01 (ref 1–1.03)
SQUAMOUS #/AREA URNS HPF: 3 /HPF
URN SPEC COLLECT METH UR: ABNORMAL
UROBILINOGEN UR STRIP-ACNC: NEGATIVE EU/DL
WBC # BLD AUTO: 14.18 K/UL (ref 3.9–12.7)
WBC #/AREA URNS HPF: 22 /HPF (ref 0–5)

## 2021-05-06 PROCEDURE — 25000003 PHARM REV CODE 250: Performed by: STUDENT IN AN ORGANIZED HEALTH CARE EDUCATION/TRAINING PROGRAM

## 2021-05-06 PROCEDURE — 71000033 HC RECOVERY, INTIAL HOUR: Performed by: STUDENT IN AN ORGANIZED HEALTH CARE EDUCATION/TRAINING PROGRAM

## 2021-05-06 PROCEDURE — 93005 ELECTROCARDIOGRAM TRACING: CPT | Performed by: INTERNAL MEDICINE

## 2021-05-06 PROCEDURE — 96365 THER/PROPH/DIAG IV INF INIT: CPT

## 2021-05-06 PROCEDURE — 80053 COMPREHEN METABOLIC PANEL: CPT | Performed by: EMERGENCY MEDICINE

## 2021-05-06 PROCEDURE — 27202107 HC XP QUATRO SENSOR: Performed by: ANESTHESIOLOGY

## 2021-05-06 PROCEDURE — 25000003 PHARM REV CODE 250: Performed by: EMERGENCY MEDICINE

## 2021-05-06 PROCEDURE — 27000221 HC OXYGEN, UP TO 24 HOURS

## 2021-05-06 PROCEDURE — 99223 1ST HOSP IP/OBS HIGH 75: CPT | Mod: 57,,, | Performed by: STUDENT IN AN ORGANIZED HEALTH CARE EDUCATION/TRAINING PROGRAM

## 2021-05-06 PROCEDURE — S0030 INJECTION, METRONIDAZOLE: HCPCS | Performed by: INTERNAL MEDICINE

## 2021-05-06 PROCEDURE — 63600175 PHARM REV CODE 636 W HCPCS: Performed by: EMERGENCY MEDICINE

## 2021-05-06 PROCEDURE — 88309 TISSUE EXAM BY PATHOLOGIST: CPT | Mod: TC

## 2021-05-06 PROCEDURE — 99900035 HC TECH TIME PER 15 MIN (STAT)

## 2021-05-06 PROCEDURE — 85025 COMPLETE CBC W/AUTO DIFF WBC: CPT | Performed by: EMERGENCY MEDICINE

## 2021-05-06 PROCEDURE — 87077 CULTURE AEROBIC IDENTIFY: CPT | Performed by: EMERGENCY MEDICINE

## 2021-05-06 PROCEDURE — 87086 URINE CULTURE/COLONY COUNT: CPT | Performed by: EMERGENCY MEDICINE

## 2021-05-06 PROCEDURE — 87040 BLOOD CULTURE FOR BACTERIA: CPT | Performed by: EMERGENCY MEDICINE

## 2021-05-06 PROCEDURE — 25500020 PHARM REV CODE 255: Performed by: EMERGENCY MEDICINE

## 2021-05-06 PROCEDURE — 36000708 HC OR TIME LEV III 1ST 15 MIN: Performed by: STUDENT IN AN ORGANIZED HEALTH CARE EDUCATION/TRAINING PROGRAM

## 2021-05-06 PROCEDURE — 37000009 HC ANESTHESIA EA ADD 15 MINS: Performed by: STUDENT IN AN ORGANIZED HEALTH CARE EDUCATION/TRAINING PROGRAM

## 2021-05-06 PROCEDURE — 44160 REMOVAL OF COLON: CPT | Mod: ,,, | Performed by: STUDENT IN AN ORGANIZED HEALTH CARE EDUCATION/TRAINING PROGRAM

## 2021-05-06 PROCEDURE — 25000003 PHARM REV CODE 250: Performed by: INTERNAL MEDICINE

## 2021-05-06 PROCEDURE — 87186 SC STD MICRODIL/AGAR DIL: CPT | Performed by: EMERGENCY MEDICINE

## 2021-05-06 PROCEDURE — 63600175 PHARM REV CODE 636 W HCPCS: Performed by: NURSE ANESTHETIST, CERTIFIED REGISTERED

## 2021-05-06 PROCEDURE — 93010 ELECTROCARDIOGRAM REPORT: CPT | Mod: ,,, | Performed by: INTERNAL MEDICINE

## 2021-05-06 PROCEDURE — 99285 EMERGENCY DEPT VISIT HI MDM: CPT | Mod: 25

## 2021-05-06 PROCEDURE — 93010 EKG 12-LEAD: ICD-10-PCS | Mod: 76,,, | Performed by: INTERNAL MEDICINE

## 2021-05-06 PROCEDURE — 27000673 HC TUBING BLOOD Y: Performed by: ANESTHESIOLOGY

## 2021-05-06 PROCEDURE — 63600175 PHARM REV CODE 636 W HCPCS: Performed by: ANESTHESIOLOGY

## 2021-05-06 PROCEDURE — 44160 PR REMVL COLON & TERM ILEUM W/ILEOCOLOSTOMY: ICD-10-PCS | Mod: ,,, | Performed by: STUDENT IN AN ORGANIZED HEALTH CARE EDUCATION/TRAINING PROGRAM

## 2021-05-06 PROCEDURE — 27201423 OPTIME MED/SURG SUP & DEVICES STERILE SUPPLY: Performed by: STUDENT IN AN ORGANIZED HEALTH CARE EDUCATION/TRAINING PROGRAM

## 2021-05-06 PROCEDURE — 99900031 HC PATIENT EDUCATION (STAT)

## 2021-05-06 PROCEDURE — 83690 ASSAY OF LIPASE: CPT | Performed by: EMERGENCY MEDICINE

## 2021-05-06 PROCEDURE — 27201107 HC STYLET, STANDARD: Performed by: ANESTHESIOLOGY

## 2021-05-06 PROCEDURE — 21000000 HC CCU ICU ROOM CHARGE

## 2021-05-06 PROCEDURE — U0002 COVID-19 LAB TEST NON-CDC: HCPCS | Performed by: EMERGENCY MEDICINE

## 2021-05-06 PROCEDURE — 71000039 HC RECOVERY, EACH ADD'L HOUR: Performed by: STUDENT IN AN ORGANIZED HEALTH CARE EDUCATION/TRAINING PROGRAM

## 2021-05-06 PROCEDURE — 25000003 PHARM REV CODE 250: Performed by: NURSE ANESTHETIST, CERTIFIED REGISTERED

## 2021-05-06 PROCEDURE — 37000008 HC ANESTHESIA 1ST 15 MINUTES: Performed by: STUDENT IN AN ORGANIZED HEALTH CARE EDUCATION/TRAINING PROGRAM

## 2021-05-06 PROCEDURE — 63600175 PHARM REV CODE 636 W HCPCS: Performed by: INTERNAL MEDICINE

## 2021-05-06 PROCEDURE — 81001 URINALYSIS AUTO W/SCOPE: CPT | Performed by: EMERGENCY MEDICINE

## 2021-05-06 PROCEDURE — 99223 PR INITIAL HOSPITAL CARE,LEVL III: ICD-10-PCS | Mod: 57,,, | Performed by: STUDENT IN AN ORGANIZED HEALTH CARE EDUCATION/TRAINING PROGRAM

## 2021-05-06 PROCEDURE — 36000709 HC OR TIME LEV III EA ADD 15 MIN: Performed by: STUDENT IN AN ORGANIZED HEALTH CARE EDUCATION/TRAINING PROGRAM

## 2021-05-06 PROCEDURE — 63600175 PHARM REV CODE 636 W HCPCS

## 2021-05-06 PROCEDURE — 93010 ELECTROCARDIOGRAM REPORT: CPT | Mod: 76,,, | Performed by: INTERNAL MEDICINE

## 2021-05-06 PROCEDURE — 27000671 HC TUBING MICROBORE EXT: Performed by: ANESTHESIOLOGY

## 2021-05-06 RX ORDER — OXYCODONE HYDROCHLORIDE 5 MG/1
5 TABLET ORAL
Status: COMPLETED | OUTPATIENT
Start: 2021-05-06 | End: 2021-05-09

## 2021-05-06 RX ORDER — LOSARTAN POTASSIUM 50 MG/1
50 TABLET ORAL DAILY
Status: DISCONTINUED | OUTPATIENT
Start: 2021-05-07 | End: 2021-05-06

## 2021-05-06 RX ORDER — PHENYLEPHRINE HYDROCHLORIDE 10 MG/ML
INJECTION INTRAVENOUS
Status: DISCONTINUED | OUTPATIENT
Start: 2021-05-06 | End: 2021-05-06

## 2021-05-06 RX ORDER — FLUTICASONE PROPIONATE 50 MCG
2 SPRAY, SUSPENSION (ML) NASAL DAILY PRN
Status: DISCONTINUED | OUTPATIENT
Start: 2021-05-06 | End: 2021-05-13 | Stop reason: HOSPADM

## 2021-05-06 RX ORDER — SUCCINYLCHOLINE CHLORIDE 20 MG/ML
INJECTION INTRAMUSCULAR; INTRAVENOUS
Status: DISCONTINUED | OUTPATIENT
Start: 2021-05-06 | End: 2021-05-06

## 2021-05-06 RX ORDER — SODIUM CHLORIDE 450 MG/100ML
INJECTION, SOLUTION INTRAVENOUS CONTINUOUS
Status: DISCONTINUED | OUTPATIENT
Start: 2021-05-06 | End: 2021-05-06

## 2021-05-06 RX ORDER — NITROGLYCERIN 0.4 MG/1
0.4 TABLET SUBLINGUAL EVERY 5 MIN PRN
Status: DISCONTINUED | OUTPATIENT
Start: 2021-05-06 | End: 2021-05-13 | Stop reason: HOSPADM

## 2021-05-06 RX ORDER — ENALAPRILAT 1.25 MG/ML
1.25 INJECTION INTRAVENOUS EVERY 6 HOURS
Status: DISCONTINUED | OUTPATIENT
Start: 2021-05-07 | End: 2021-05-08

## 2021-05-06 RX ORDER — TAMSULOSIN HYDROCHLORIDE 0.4 MG/1
0.4 CAPSULE ORAL
Status: DISCONTINUED | OUTPATIENT
Start: 2021-05-06 | End: 2021-05-06

## 2021-05-06 RX ORDER — CARVEDILOL 12.5 MG/1
12.5 TABLET ORAL 2 TIMES DAILY
Status: DISCONTINUED | OUTPATIENT
Start: 2021-05-06 | End: 2021-05-06

## 2021-05-06 RX ORDER — PROPOFOL 10 MG/ML
VIAL (ML) INTRAVENOUS
Status: DISCONTINUED | OUTPATIENT
Start: 2021-05-06 | End: 2021-05-06

## 2021-05-06 RX ORDER — ACETAMINOPHEN 10 MG/ML
INJECTION, SOLUTION INTRAVENOUS
Status: DISCONTINUED | OUTPATIENT
Start: 2021-05-06 | End: 2021-05-06

## 2021-05-06 RX ORDER — PROCHLORPERAZINE EDISYLATE 5 MG/ML
5 INJECTION INTRAMUSCULAR; INTRAVENOUS EVERY 30 MIN PRN
Status: DISCONTINUED | OUTPATIENT
Start: 2021-05-06 | End: 2021-05-10

## 2021-05-06 RX ORDER — SODIUM CHLORIDE, SODIUM LACTATE, POTASSIUM CHLORIDE, CALCIUM CHLORIDE 600; 310; 30; 20 MG/100ML; MG/100ML; MG/100ML; MG/100ML
INJECTION, SOLUTION INTRAVENOUS CONTINUOUS PRN
Status: DISCONTINUED | OUTPATIENT
Start: 2021-05-06 | End: 2021-05-06

## 2021-05-06 RX ORDER — SODIUM CHLORIDE 0.9 % (FLUSH) 0.9 %
10 SYRINGE (ML) INJECTION
Status: DISCONTINUED | OUTPATIENT
Start: 2021-05-06 | End: 2021-05-12

## 2021-05-06 RX ORDER — LIDOCAINE HYDROCHLORIDE 20 MG/ML
JELLY TOPICAL
Status: DISCONTINUED | OUTPATIENT
Start: 2021-05-06 | End: 2021-05-06

## 2021-05-06 RX ORDER — BUPIVACAINE HYDROCHLORIDE AND EPINEPHRINE 5; 5 MG/ML; UG/ML
INJECTION, SOLUTION EPIDURAL; INTRACAUDAL; PERINEURAL
Status: DISCONTINUED | OUTPATIENT
Start: 2021-05-06 | End: 2021-05-06 | Stop reason: HOSPADM

## 2021-05-06 RX ORDER — FENTANYL CITRATE 50 UG/ML
INJECTION, SOLUTION INTRAMUSCULAR; INTRAVENOUS
Status: DISCONTINUED | OUTPATIENT
Start: 2021-05-06 | End: 2021-05-06

## 2021-05-06 RX ORDER — KETAMINE HYDROCHLORIDE 50 MG/ML
INJECTION, SOLUTION INTRAMUSCULAR; INTRAVENOUS
Status: DISCONTINUED | OUTPATIENT
Start: 2021-05-06 | End: 2021-05-06

## 2021-05-06 RX ORDER — EPINEPHRINE 0.22MG
100 AEROSOL WITH ADAPTER (ML) INHALATION EVERY MORNING
COMMUNITY
Start: 2008-07-08 | End: 2021-07-21

## 2021-05-06 RX ORDER — AMLODIPINE BESYLATE 5 MG/1
5 TABLET ORAL NIGHTLY
Status: DISCONTINUED | OUTPATIENT
Start: 2021-05-06 | End: 2021-05-06

## 2021-05-06 RX ORDER — SODIUM CHLORIDE 0.9 % (FLUSH) 0.9 %
10 SYRINGE (ML) INJECTION
Status: DISCONTINUED | OUTPATIENT
Start: 2021-05-06 | End: 2021-05-06

## 2021-05-06 RX ORDER — LIDOCAINE HYDROCHLORIDE 20 MG/ML
INJECTION, SOLUTION EPIDURAL; INFILTRATION; INTRACAUDAL; PERINEURAL
Status: DISCONTINUED | OUTPATIENT
Start: 2021-05-06 | End: 2021-05-06

## 2021-05-06 RX ORDER — HYDROMORPHONE HYDROCHLORIDE 1 MG/ML
0.2 INJECTION, SOLUTION INTRAMUSCULAR; INTRAVENOUS; SUBCUTANEOUS EVERY 5 MIN PRN
Status: COMPLETED | OUTPATIENT
Start: 2021-05-06 | End: 2021-05-06

## 2021-05-06 RX ORDER — ROCURONIUM BROMIDE 10 MG/ML
INJECTION, SOLUTION INTRAVENOUS
Status: DISCONTINUED | OUTPATIENT
Start: 2021-05-06 | End: 2021-05-06

## 2021-05-06 RX ORDER — DILTIAZEM HCL 1 MG/ML
0-15 INJECTION, SOLUTION INTRAVENOUS CONTINUOUS
Status: DISCONTINUED | OUTPATIENT
Start: 2021-05-06 | End: 2021-05-06

## 2021-05-06 RX ORDER — MEPERIDINE HYDROCHLORIDE 50 MG/ML
12.5 INJECTION INTRAMUSCULAR; INTRAVENOUS; SUBCUTANEOUS EVERY 10 MIN PRN
Status: ACTIVE | OUTPATIENT
Start: 2021-05-06 | End: 2021-05-06

## 2021-05-06 RX ORDER — HYDROMORPHONE HYDROCHLORIDE 1 MG/ML
INJECTION, SOLUTION INTRAMUSCULAR; INTRAVENOUS; SUBCUTANEOUS
Status: COMPLETED
Start: 2021-05-06 | End: 2021-05-06

## 2021-05-06 RX ORDER — FAMOTIDINE 10 MG/ML
INJECTION INTRAVENOUS
Status: DISCONTINUED | OUTPATIENT
Start: 2021-05-06 | End: 2021-05-06

## 2021-05-06 RX ORDER — ONDANSETRON 2 MG/ML
4 INJECTION INTRAMUSCULAR; INTRAVENOUS EVERY 4 HOURS PRN
Status: DISCONTINUED | OUTPATIENT
Start: 2021-05-06 | End: 2021-05-12

## 2021-05-06 RX ORDER — METRONIDAZOLE 500 MG/100ML
500 INJECTION, SOLUTION INTRAVENOUS
Status: DISCONTINUED | OUTPATIENT
Start: 2021-05-06 | End: 2021-05-11

## 2021-05-06 RX ADMIN — HYDROMORPHONE HYDROCHLORIDE 0.2 MG: 1 INJECTION, SOLUTION INTRAMUSCULAR; INTRAVENOUS; SUBCUTANEOUS at 08:05

## 2021-05-06 RX ADMIN — SUCCINYLCHOLINE CHLORIDE 120 MG: 20 INJECTION, SOLUTION INTRAMUSCULAR; INTRAVENOUS at 05:05

## 2021-05-06 RX ADMIN — LIDOCAINE HYDROCHLORIDE 60 MG: 20 INJECTION, SOLUTION EPIDURAL; INFILTRATION; INTRACAUDAL; PERINEURAL at 05:05

## 2021-05-06 RX ADMIN — HYDROMORPHONE HYDROCHLORIDE 0.2 MG: 1 INJECTION, SOLUTION INTRAMUSCULAR; INTRAVENOUS; SUBCUTANEOUS at 07:05

## 2021-05-06 RX ADMIN — HYDROMORPHONE HYDROCHLORIDE 0.2 MG: 1 INJECTION, SOLUTION INTRAMUSCULAR; INTRAVENOUS; SUBCUTANEOUS at 09:05

## 2021-05-06 RX ADMIN — ROCURONIUM BROMIDE 20 MG: 10 INJECTION, SOLUTION INTRAVENOUS at 05:05

## 2021-05-06 RX ADMIN — FAMOTIDINE 20 MG: 10 INJECTION, SOLUTION INTRAVENOUS at 05:05

## 2021-05-06 RX ADMIN — METRONIDAZOLE 500 MG: 500 INJECTION, SOLUTION INTRAVENOUS at 03:05

## 2021-05-06 RX ADMIN — PHENYLEPHRINE HYDROCHLORIDE 100 MCG: 10 INJECTION INTRAVENOUS at 05:05

## 2021-05-06 RX ADMIN — SODIUM CHLORIDE, SODIUM LACTATE, POTASSIUM CHLORIDE, AND CALCIUM CHLORIDE: .6; .31; .03; .02 INJECTION, SOLUTION INTRAVENOUS at 04:05

## 2021-05-06 RX ADMIN — PHENYLEPHRINE HYDROCHLORIDE 200 MCG: 10 INJECTION INTRAVENOUS at 06:05

## 2021-05-06 RX ADMIN — ROCURONIUM BROMIDE 20 MG: 10 INJECTION, SOLUTION INTRAVENOUS at 06:05

## 2021-05-06 RX ADMIN — SODIUM CHLORIDE, SODIUM LACTATE, POTASSIUM CHLORIDE, AND CALCIUM CHLORIDE: .6; .31; .03; .02 INJECTION, SOLUTION INTRAVENOUS at 05:05

## 2021-05-06 RX ADMIN — CEFTRIAXONE 2 G: 2 INJECTION, SOLUTION INTRAVENOUS at 03:05

## 2021-05-06 RX ADMIN — PROPOFOL 130 MG: 10 INJECTION, EMULSION INTRAVENOUS at 05:05

## 2021-05-06 RX ADMIN — LIDOCAINE HYDROCHLORIDE 4 ML: 20 JELLY TOPICAL at 05:05

## 2021-05-06 RX ADMIN — IOHEXOL 100 ML: 350 INJECTION, SOLUTION INTRAVENOUS at 11:05

## 2021-05-06 RX ADMIN — SODIUM CHLORIDE, SODIUM LACTATE, POTASSIUM CHLORIDE, AND CALCIUM CHLORIDE: .6; .31; .03; .02 INJECTION, SOLUTION INTRAVENOUS at 06:05

## 2021-05-06 RX ADMIN — KETAMINE HYDROCHLORIDE 35 MG: 50 INJECTION INTRAMUSCULAR; INTRAVENOUS at 06:05

## 2021-05-06 RX ADMIN — SUGAMMADEX 200 MG: 100 INJECTION, SOLUTION INTRAVENOUS at 07:05

## 2021-05-06 RX ADMIN — FENTANYL CITRATE 50 MCG: 50 INJECTION INTRAMUSCULAR; INTRAVENOUS at 07:05

## 2021-05-06 RX ADMIN — ROCURONIUM BROMIDE 10 MG: 10 INJECTION, SOLUTION INTRAVENOUS at 05:05

## 2021-05-06 RX ADMIN — TAMSULOSIN HYDROCHLORIDE 0.4 MG: 0.4 CAPSULE ORAL at 03:05

## 2021-05-06 RX ADMIN — ACETAMINOPHEN 1000 MG: 10 INJECTION, SOLUTION INTRAVENOUS at 04:05

## 2021-05-06 RX ADMIN — FENTANYL CITRATE 50 MCG: 50 INJECTION INTRAMUSCULAR; INTRAVENOUS at 05:05

## 2021-05-06 RX ADMIN — DEXTROSE 2 G: 50 INJECTION, SOLUTION INTRAVENOUS at 05:05

## 2021-05-06 RX ADMIN — SODIUM CHLORIDE: 0.45 INJECTION, SOLUTION INTRAVENOUS at 03:05

## 2021-05-06 RX ADMIN — PIPERACILLIN AND TAZOBACTAM 4.5 G: 4; .5 INJECTION, POWDER, LYOPHILIZED, FOR SOLUTION INTRAVENOUS; PARENTERAL at 12:05

## 2021-05-07 PROBLEM — R74.01 ELEVATED TRANSAMINASE LEVEL: Status: ACTIVE | Noted: 2021-05-06

## 2021-05-07 PROBLEM — E83.51 HYPOCALCEMIA: Status: ACTIVE | Noted: 2021-05-06

## 2021-05-07 PROBLEM — K56.0 ADYNAMIC ILEUS: Status: ACTIVE | Noted: 2021-05-07

## 2021-05-07 PROBLEM — D72.823 NEUTROPHILIC LEUKEMOID REACTION: Status: ACTIVE | Noted: 2021-05-06

## 2021-05-07 PROBLEM — R31.0 GROSS HEMATURIA: Chronic | Status: ACTIVE | Noted: 2021-05-07

## 2021-05-07 PROBLEM — E87.1 HYPONATREMIA: Status: ACTIVE | Noted: 2021-05-06

## 2021-05-07 PROBLEM — R77.0 ABNORMALITY OF ALBUMIN: Status: ACTIVE | Noted: 2021-05-06

## 2021-05-07 PROBLEM — D62 ACUTE BLOOD LOSS ANEMIA: Status: ACTIVE | Noted: 2021-05-07

## 2021-05-07 PROBLEM — D63.8 CHRONIC DISEASE ANEMIA: Status: ACTIVE | Noted: 2021-05-06

## 2021-05-07 PROBLEM — E83.42 HYPOMAGNESEMIA: Status: ACTIVE | Noted: 2021-05-07

## 2021-05-07 PROBLEM — I47.20 VENTRICULAR TACHYCARDIA: Status: ACTIVE | Noted: 2021-05-07

## 2021-05-07 PROBLEM — I49.1 ATRIAL PREMATURE BEATS: Status: ACTIVE | Noted: 2021-05-06

## 2021-05-07 LAB
ALBUMIN SERPL BCP-MCNC: 2.6 G/DL (ref 3.5–5.2)
ALP SERPL-CCNC: 46 U/L (ref 55–135)
ALT SERPL W/O P-5'-P-CCNC: 34 U/L (ref 10–44)
ANION GAP SERPL CALC-SCNC: 10 MMOL/L (ref 8–16)
AST SERPL-CCNC: 19 U/L (ref 10–40)
BASOPHILS # BLD AUTO: 0.02 K/UL (ref 0–0.2)
BASOPHILS NFR BLD: 0.1 % (ref 0–1.9)
BILIRUB SERPL-MCNC: 1.6 MG/DL (ref 0.1–1)
BUN SERPL-MCNC: 17 MG/DL (ref 8–23)
CALCIUM SERPL-MCNC: 7.6 MG/DL (ref 8.7–10.5)
CHLORIDE SERPL-SCNC: 98 MMOL/L (ref 95–110)
CO2 SERPL-SCNC: 24 MMOL/L (ref 23–29)
CREAT SERPL-MCNC: 0.8 MG/DL (ref 0.5–1.4)
DIFFERENTIAL METHOD: ABNORMAL
EOSINOPHIL # BLD AUTO: 0 K/UL (ref 0–0.5)
EOSINOPHIL NFR BLD: 0 % (ref 0–8)
ERYTHROCYTE [DISTWIDTH] IN BLOOD BY AUTOMATED COUNT: 12.6 % (ref 11.5–14.5)
EST. GFR  (AFRICAN AMERICAN): >60 ML/MIN/1.73 M^2
EST. GFR  (NON AFRICAN AMERICAN): >60 ML/MIN/1.73 M^2
GLUCOSE SERPL-MCNC: 171 MG/DL (ref 70–110)
HCT VFR BLD AUTO: 34.9 % (ref 40–54)
HGB BLD-MCNC: 11.9 G/DL (ref 14–18)
IMM GRANULOCYTES # BLD AUTO: 0.04 K/UL (ref 0–0.04)
IMM GRANULOCYTES NFR BLD AUTO: 0.3 % (ref 0–0.5)
LYMPHOCYTES # BLD AUTO: 0.5 K/UL (ref 1–4.8)
LYMPHOCYTES NFR BLD: 3.7 % (ref 18–48)
MAGNESIUM SERPL-MCNC: 1.6 MG/DL (ref 1.6–2.6)
MCH RBC QN AUTO: 30.1 PG (ref 27–31)
MCHC RBC AUTO-ENTMCNC: 34.1 G/DL (ref 32–36)
MCV RBC AUTO: 88 FL (ref 82–98)
MONOCYTES # BLD AUTO: 0.7 K/UL (ref 0.3–1)
MONOCYTES NFR BLD: 4.9 % (ref 4–15)
NEUTROPHILS # BLD AUTO: 12.9 K/UL (ref 1.8–7.7)
NEUTROPHILS NFR BLD: 91 % (ref 38–73)
NRBC BLD-RTO: 0 /100 WBC
PLATELET # BLD AUTO: 228 K/UL (ref 150–450)
PMV BLD AUTO: 10.5 FL (ref 9.2–12.9)
POTASSIUM SERPL-SCNC: 4 MMOL/L (ref 3.5–5.1)
PROT SERPL-MCNC: 6 G/DL (ref 6–8.4)
RBC # BLD AUTO: 3.96 M/UL (ref 4.6–6.2)
SODIUM SERPL-SCNC: 132 MMOL/L (ref 136–145)
WBC # BLD AUTO: 14.12 K/UL (ref 3.9–12.7)

## 2021-05-07 PROCEDURE — 99900031 HC PATIENT EDUCATION (STAT)

## 2021-05-07 PROCEDURE — S0030 INJECTION, METRONIDAZOLE: HCPCS | Performed by: STUDENT IN AN ORGANIZED HEALTH CARE EDUCATION/TRAINING PROGRAM

## 2021-05-07 PROCEDURE — 25000003 PHARM REV CODE 250: Performed by: STUDENT IN AN ORGANIZED HEALTH CARE EDUCATION/TRAINING PROGRAM

## 2021-05-07 PROCEDURE — S0030 INJECTION, METRONIDAZOLE: HCPCS | Performed by: INTERNAL MEDICINE

## 2021-05-07 PROCEDURE — 99900035 HC TECH TIME PER 15 MIN (STAT)

## 2021-05-07 PROCEDURE — 80053 COMPREHEN METABOLIC PANEL: CPT | Performed by: INTERNAL MEDICINE

## 2021-05-07 PROCEDURE — 85025 COMPLETE CBC W/AUTO DIFF WBC: CPT | Performed by: INTERNAL MEDICINE

## 2021-05-07 PROCEDURE — 83735 ASSAY OF MAGNESIUM: CPT | Performed by: INTERNAL MEDICINE

## 2021-05-07 PROCEDURE — 25000003 PHARM REV CODE 250: Performed by: INTERNAL MEDICINE

## 2021-05-07 PROCEDURE — 63600175 PHARM REV CODE 636 W HCPCS: Performed by: STUDENT IN AN ORGANIZED HEALTH CARE EDUCATION/TRAINING PROGRAM

## 2021-05-07 PROCEDURE — 36415 COLL VENOUS BLD VENIPUNCTURE: CPT | Performed by: INTERNAL MEDICINE

## 2021-05-07 PROCEDURE — 94761 N-INVAS EAR/PLS OXIMETRY MLT: CPT

## 2021-05-07 PROCEDURE — 63600175 PHARM REV CODE 636 W HCPCS: Performed by: INTERNAL MEDICINE

## 2021-05-07 PROCEDURE — 27000221 HC OXYGEN, UP TO 24 HOURS

## 2021-05-07 PROCEDURE — 21000000 HC CCU ICU ROOM CHARGE

## 2021-05-07 PROCEDURE — C9113 INJ PANTOPRAZOLE SODIUM, VIA: HCPCS | Performed by: INTERNAL MEDICINE

## 2021-05-07 RX ORDER — MAGNESIUM SULFATE 1 G/100ML
1 INJECTION INTRAVENOUS ONCE
Status: COMPLETED | OUTPATIENT
Start: 2021-05-07 | End: 2021-05-07

## 2021-05-07 RX ORDER — ENOXAPARIN SODIUM 100 MG/ML
40 INJECTION SUBCUTANEOUS
Status: DISCONTINUED | OUTPATIENT
Start: 2021-05-07 | End: 2021-05-13 | Stop reason: HOSPADM

## 2021-05-07 RX ORDER — PANTOPRAZOLE SODIUM 40 MG/10ML
40 INJECTION, POWDER, LYOPHILIZED, FOR SOLUTION INTRAVENOUS EVERY 24 HOURS
Status: DISCONTINUED | OUTPATIENT
Start: 2021-05-07 | End: 2021-05-12

## 2021-05-07 RX ORDER — HYDROMORPHONE HYDROCHLORIDE 1 MG/ML
0.5 INJECTION, SOLUTION INTRAMUSCULAR; INTRAVENOUS; SUBCUTANEOUS EVERY 4 HOURS PRN
Status: DISCONTINUED | OUTPATIENT
Start: 2021-05-07 | End: 2021-05-10

## 2021-05-07 RX ORDER — DEXTROSE MONOHYDRATE, SODIUM CHLORIDE, AND POTASSIUM CHLORIDE 50; 1.49; 4.5 G/1000ML; G/1000ML; G/1000ML
INJECTION, SOLUTION INTRAVENOUS CONTINUOUS
Status: DISCONTINUED | OUTPATIENT
Start: 2021-05-07 | End: 2021-05-08

## 2021-05-07 RX ORDER — HYDROMORPHONE HYDROCHLORIDE 1 MG/ML
1 INJECTION, SOLUTION INTRAMUSCULAR; INTRAVENOUS; SUBCUTANEOUS EVERY 4 HOURS PRN
Status: DISCONTINUED | OUTPATIENT
Start: 2021-05-07 | End: 2021-05-10

## 2021-05-07 RX ADMIN — MAGNESIUM SULFATE 1 G: 1 INJECTION INTRAVENOUS at 01:05

## 2021-05-07 RX ADMIN — HYDROMORPHONE HYDROCHLORIDE 1 MG: 1 INJECTION, SOLUTION INTRAMUSCULAR; INTRAVENOUS; SUBCUTANEOUS at 11:05

## 2021-05-07 RX ADMIN — METRONIDAZOLE 500 MG: 500 INJECTION, SOLUTION INTRAVENOUS at 10:05

## 2021-05-07 RX ADMIN — HYDROMORPHONE HYDROCHLORIDE 1 MG: 1 INJECTION, SOLUTION INTRAMUSCULAR; INTRAVENOUS; SUBCUTANEOUS at 07:05

## 2021-05-07 RX ADMIN — PANTOPRAZOLE SODIUM 40 MG: 40 INJECTION, POWDER, FOR SOLUTION INTRAVENOUS at 01:05

## 2021-05-07 RX ADMIN — METRONIDAZOLE 500 MG: 500 INJECTION, SOLUTION INTRAVENOUS at 05:05

## 2021-05-07 RX ADMIN — CEFTRIAXONE 2 G: 2 INJECTION, SOLUTION INTRAVENOUS at 01:05

## 2021-05-07 RX ADMIN — HYDROMORPHONE HYDROCHLORIDE 1 MG: 1 INJECTION, SOLUTION INTRAMUSCULAR; INTRAVENOUS; SUBCUTANEOUS at 03:05

## 2021-05-07 RX ADMIN — ONDANSETRON 4 MG: 2 INJECTION INTRAMUSCULAR; INTRAVENOUS at 11:05

## 2021-05-07 RX ADMIN — POTASSIUM CHLORIDE, DEXTROSE MONOHYDRATE AND SODIUM CHLORIDE: 150; 5; 450 INJECTION, SOLUTION INTRAVENOUS at 03:05

## 2021-05-07 RX ADMIN — ONDANSETRON 4 MG: 2 INJECTION INTRAMUSCULAR; INTRAVENOUS at 03:05

## 2021-05-07 RX ADMIN — ENOXAPARIN SODIUM 40 MG: 40 INJECTION SUBCUTANEOUS at 01:05

## 2021-05-07 RX ADMIN — ENALAPRILAT 1.25 MG: 1.25 INJECTION INTRAVENOUS at 05:05

## 2021-05-07 RX ADMIN — ENALAPRILAT 1.25 MG: 1.25 INJECTION INTRAVENOUS at 12:05

## 2021-05-07 RX ADMIN — DILTIAZEM HYDROCHLORIDE 5 MG/HR: 100 INJECTION, POWDER, LYOPHILIZED, FOR SOLUTION INTRAVENOUS at 10:05

## 2021-05-07 RX ADMIN — ENALAPRILAT 1.25 MG: 1.25 INJECTION INTRAVENOUS at 11:05

## 2021-05-07 RX ADMIN — METRONIDAZOLE 500 MG: 500 INJECTION, SOLUTION INTRAVENOUS at 01:05

## 2021-05-07 RX ADMIN — METRONIDAZOLE 500 MG: 500 INJECTION, SOLUTION INTRAVENOUS at 12:05

## 2021-05-07 RX ADMIN — HYDROMORPHONE HYDROCHLORIDE 1 MG: 1 INJECTION, SOLUTION INTRAMUSCULAR; INTRAVENOUS; SUBCUTANEOUS at 01:05

## 2021-05-07 RX ADMIN — ENALAPRILAT 1.25 MG: 1.25 INJECTION INTRAVENOUS at 01:05

## 2021-05-07 RX ADMIN — DILTIAZEM HYDROCHLORIDE 5 MG/HR: 100 INJECTION, POWDER, LYOPHILIZED, FOR SOLUTION INTRAVENOUS at 04:05

## 2021-05-07 RX ADMIN — HYDROMORPHONE HYDROCHLORIDE 1 MG: 1 INJECTION, SOLUTION INTRAMUSCULAR; INTRAVENOUS; SUBCUTANEOUS at 05:05

## 2021-05-08 LAB
ALBUMIN SERPL BCP-MCNC: 2.6 G/DL (ref 3.5–5.2)
ALP SERPL-CCNC: 52 U/L (ref 55–135)
ALT SERPL W/O P-5'-P-CCNC: 24 U/L (ref 10–44)
ANION GAP SERPL CALC-SCNC: 9 MMOL/L (ref 8–16)
AST SERPL-CCNC: 16 U/L (ref 10–40)
BACTERIA UR CULT: ABNORMAL
BASOPHILS # BLD AUTO: 0.06 K/UL (ref 0–0.2)
BASOPHILS NFR BLD: 0.5 % (ref 0–1.9)
BILIRUB SERPL-MCNC: 0.9 MG/DL (ref 0.1–1)
BUN SERPL-MCNC: 21 MG/DL (ref 8–23)
CALCIUM SERPL-MCNC: 7.7 MG/DL (ref 8.7–10.5)
CHLORIDE SERPL-SCNC: 99 MMOL/L (ref 95–110)
CO2 SERPL-SCNC: 25 MMOL/L (ref 23–29)
CREAT SERPL-MCNC: 0.8 MG/DL (ref 0.5–1.4)
DIFFERENTIAL METHOD: ABNORMAL
EOSINOPHIL # BLD AUTO: 0.1 K/UL (ref 0–0.5)
EOSINOPHIL NFR BLD: 0.5 % (ref 0–8)
ERYTHROCYTE [DISTWIDTH] IN BLOOD BY AUTOMATED COUNT: 13.1 % (ref 11.5–14.5)
EST. GFR  (AFRICAN AMERICAN): >60 ML/MIN/1.73 M^2
EST. GFR  (NON AFRICAN AMERICAN): >60 ML/MIN/1.73 M^2
GLUCOSE SERPL-MCNC: 131 MG/DL (ref 70–110)
HCT VFR BLD AUTO: 34.7 % (ref 40–54)
HGB BLD-MCNC: 11.3 G/DL (ref 14–18)
IMM GRANULOCYTES # BLD AUTO: 0.1 K/UL (ref 0–0.04)
IMM GRANULOCYTES NFR BLD AUTO: 0.8 % (ref 0–0.5)
LYMPHOCYTES # BLD AUTO: 1.2 K/UL (ref 1–4.8)
LYMPHOCYTES NFR BLD: 9.3 % (ref 18–48)
MAGNESIUM SERPL-MCNC: 2 MG/DL (ref 1.6–2.6)
MCH RBC QN AUTO: 29.7 PG (ref 27–31)
MCHC RBC AUTO-ENTMCNC: 32.6 G/DL (ref 32–36)
MCV RBC AUTO: 91 FL (ref 82–98)
MONOCYTES # BLD AUTO: 0.9 K/UL (ref 0.3–1)
MONOCYTES NFR BLD: 7.1 % (ref 4–15)
NEUTROPHILS # BLD AUTO: 10.4 K/UL (ref 1.8–7.7)
NEUTROPHILS NFR BLD: 81.8 % (ref 38–73)
NRBC BLD-RTO: 0 /100 WBC
PLATELET # BLD AUTO: 218 K/UL (ref 150–450)
PMV BLD AUTO: 10.4 FL (ref 9.2–12.9)
POTASSIUM SERPL-SCNC: 4.2 MMOL/L (ref 3.5–5.1)
PROT SERPL-MCNC: 6 G/DL (ref 6–8.4)
RBC # BLD AUTO: 3.81 M/UL (ref 4.6–6.2)
SODIUM SERPL-SCNC: 133 MMOL/L (ref 136–145)
WBC # BLD AUTO: 12.74 K/UL (ref 3.9–12.7)

## 2021-05-08 PROCEDURE — 83735 ASSAY OF MAGNESIUM: CPT | Performed by: INTERNAL MEDICINE

## 2021-05-08 PROCEDURE — 25000003 PHARM REV CODE 250: Performed by: STUDENT IN AN ORGANIZED HEALTH CARE EDUCATION/TRAINING PROGRAM

## 2021-05-08 PROCEDURE — 99900035 HC TECH TIME PER 15 MIN (STAT)

## 2021-05-08 PROCEDURE — 99900031 HC PATIENT EDUCATION (STAT)

## 2021-05-08 PROCEDURE — 94761 N-INVAS EAR/PLS OXIMETRY MLT: CPT

## 2021-05-08 PROCEDURE — 80053 COMPREHEN METABOLIC PANEL: CPT | Performed by: STUDENT IN AN ORGANIZED HEALTH CARE EDUCATION/TRAINING PROGRAM

## 2021-05-08 PROCEDURE — 97116 GAIT TRAINING THERAPY: CPT

## 2021-05-08 PROCEDURE — C9113 INJ PANTOPRAZOLE SODIUM, VIA: HCPCS | Performed by: INTERNAL MEDICINE

## 2021-05-08 PROCEDURE — 25000003 PHARM REV CODE 250: Performed by: INTERNAL MEDICINE

## 2021-05-08 PROCEDURE — 21000000 HC CCU ICU ROOM CHARGE

## 2021-05-08 PROCEDURE — 97161 PT EVAL LOW COMPLEX 20 MIN: CPT

## 2021-05-08 PROCEDURE — 63600175 PHARM REV CODE 636 W HCPCS: Performed by: STUDENT IN AN ORGANIZED HEALTH CARE EDUCATION/TRAINING PROGRAM

## 2021-05-08 PROCEDURE — 25000242 PHARM REV CODE 250 ALT 637 W/ HCPCS: Performed by: INTERNAL MEDICINE

## 2021-05-08 PROCEDURE — 36415 COLL VENOUS BLD VENIPUNCTURE: CPT | Performed by: STUDENT IN AN ORGANIZED HEALTH CARE EDUCATION/TRAINING PROGRAM

## 2021-05-08 PROCEDURE — 94640 AIRWAY INHALATION TREATMENT: CPT

## 2021-05-08 PROCEDURE — 85025 COMPLETE CBC W/AUTO DIFF WBC: CPT | Performed by: STUDENT IN AN ORGANIZED HEALTH CARE EDUCATION/TRAINING PROGRAM

## 2021-05-08 PROCEDURE — S0030 INJECTION, METRONIDAZOLE: HCPCS | Performed by: STUDENT IN AN ORGANIZED HEALTH CARE EDUCATION/TRAINING PROGRAM

## 2021-05-08 PROCEDURE — 63600175 PHARM REV CODE 636 W HCPCS: Performed by: INTERNAL MEDICINE

## 2021-05-08 RX ORDER — CARVEDILOL 12.5 MG/1
12.5 TABLET ORAL 2 TIMES DAILY
Status: DISCONTINUED | OUTPATIENT
Start: 2021-05-08 | End: 2021-05-13 | Stop reason: HOSPADM

## 2021-05-08 RX ORDER — CLOPIDOGREL BISULFATE 75 MG/1
75 TABLET ORAL EVERY MORNING
Status: DISCONTINUED | OUTPATIENT
Start: 2021-05-09 | End: 2021-05-13 | Stop reason: HOSPADM

## 2021-05-08 RX ORDER — AMLODIPINE BESYLATE 5 MG/1
5 TABLET ORAL NIGHTLY
Status: DISCONTINUED | OUTPATIENT
Start: 2021-05-08 | End: 2021-05-11

## 2021-05-08 RX ORDER — TAMSULOSIN HYDROCHLORIDE 0.4 MG/1
0.4 CAPSULE ORAL
Status: DISCONTINUED | OUTPATIENT
Start: 2021-05-08 | End: 2021-05-13 | Stop reason: HOSPADM

## 2021-05-08 RX ORDER — FUROSEMIDE 10 MG/ML
20 INJECTION INTRAMUSCULAR; INTRAVENOUS ONCE
Status: COMPLETED | OUTPATIENT
Start: 2021-05-08 | End: 2021-05-08

## 2021-05-08 RX ORDER — ALBUTEROL SULFATE 0.83 MG/ML
2.5 SOLUTION RESPIRATORY (INHALATION) EVERY 8 HOURS
Status: DISCONTINUED | OUTPATIENT
Start: 2021-05-08 | End: 2021-05-13

## 2021-05-08 RX ORDER — LOSARTAN POTASSIUM 50 MG/1
50 TABLET ORAL EVERY MORNING
Status: DISCONTINUED | OUTPATIENT
Start: 2021-05-09 | End: 2021-05-13 | Stop reason: HOSPADM

## 2021-05-08 RX ADMIN — ENALAPRILAT 1.25 MG: 1.25 INJECTION INTRAVENOUS at 12:05

## 2021-05-08 RX ADMIN — ALBUTEROL SULFATE 2.5 MG: 2.5 SOLUTION RESPIRATORY (INHALATION) at 04:05

## 2021-05-08 RX ADMIN — DILTIAZEM HYDROCHLORIDE 10 MG/HR: 100 INJECTION, POWDER, LYOPHILIZED, FOR SOLUTION INTRAVENOUS at 07:05

## 2021-05-08 RX ADMIN — HYDROMORPHONE HYDROCHLORIDE 1 MG: 1 INJECTION, SOLUTION INTRAMUSCULAR; INTRAVENOUS; SUBCUTANEOUS at 10:05

## 2021-05-08 RX ADMIN — AMLODIPINE BESYLATE 5 MG: 5 TABLET ORAL at 10:05

## 2021-05-08 RX ADMIN — ENALAPRILAT 1.25 MG: 1.25 INJECTION INTRAVENOUS at 06:05

## 2021-05-08 RX ADMIN — METRONIDAZOLE 500 MG: 500 INJECTION, SOLUTION INTRAVENOUS at 06:05

## 2021-05-08 RX ADMIN — CEFTRIAXONE 2 G: 2 INJECTION, SOLUTION INTRAVENOUS at 03:05

## 2021-05-08 RX ADMIN — TAMSULOSIN HYDROCHLORIDE 0.4 MG: 0.4 CAPSULE ORAL at 04:05

## 2021-05-08 RX ADMIN — FUROSEMIDE 20 MG: 10 INJECTION, SOLUTION INTRAVENOUS at 04:05

## 2021-05-08 RX ADMIN — CARVEDILOL 12.5 MG: 12.5 TABLET, FILM COATED ORAL at 04:05

## 2021-05-08 RX ADMIN — POTASSIUM CHLORIDE, DEXTROSE MONOHYDRATE AND SODIUM CHLORIDE: 150; 5; 450 INJECTION, SOLUTION INTRAVENOUS at 09:05

## 2021-05-08 RX ADMIN — PANTOPRAZOLE SODIUM 40 MG: 40 INJECTION, POWDER, FOR SOLUTION INTRAVENOUS at 09:05

## 2021-05-08 RX ADMIN — METRONIDAZOLE 500 MG: 500 INJECTION, SOLUTION INTRAVENOUS at 10:05

## 2021-05-08 RX ADMIN — METRONIDAZOLE 500 MG: 500 INJECTION, SOLUTION INTRAVENOUS at 04:05

## 2021-05-08 RX ADMIN — ENOXAPARIN SODIUM 40 MG: 40 INJECTION SUBCUTANEOUS at 12:05

## 2021-05-08 RX ADMIN — ONDANSETRON 4 MG: 2 INJECTION INTRAMUSCULAR; INTRAVENOUS at 10:05

## 2021-05-09 LAB
ALBUMIN SERPL BCP-MCNC: 2.5 G/DL (ref 3.5–5.2)
ALP SERPL-CCNC: 51 U/L (ref 55–135)
ALT SERPL W/O P-5'-P-CCNC: 22 U/L (ref 10–44)
ANION GAP SERPL CALC-SCNC: 12 MMOL/L (ref 8–16)
AST SERPL-CCNC: 18 U/L (ref 10–40)
BASOPHILS # BLD AUTO: 0.05 K/UL (ref 0–0.2)
BASOPHILS NFR BLD: 0.3 % (ref 0–1.9)
BILIRUB SERPL-MCNC: 1.1 MG/DL (ref 0.1–1)
BUN SERPL-MCNC: 26 MG/DL (ref 8–23)
CALCIUM SERPL-MCNC: 7.8 MG/DL (ref 8.7–10.5)
CHLORIDE SERPL-SCNC: 97 MMOL/L (ref 95–110)
CO2 SERPL-SCNC: 23 MMOL/L (ref 23–29)
CREAT SERPL-MCNC: 1 MG/DL (ref 0.5–1.4)
DIFFERENTIAL METHOD: ABNORMAL
EOSINOPHIL # BLD AUTO: 0 K/UL (ref 0–0.5)
EOSINOPHIL NFR BLD: 0.3 % (ref 0–8)
ERYTHROCYTE [DISTWIDTH] IN BLOOD BY AUTOMATED COUNT: 13 % (ref 11.5–14.5)
EST. GFR  (AFRICAN AMERICAN): >60 ML/MIN/1.73 M^2
EST. GFR  (NON AFRICAN AMERICAN): >60 ML/MIN/1.73 M^2
GLUCOSE SERPL-MCNC: 140 MG/DL (ref 70–110)
HCT VFR BLD AUTO: 36.9 % (ref 40–54)
HGB BLD-MCNC: 12.4 G/DL (ref 14–18)
IMM GRANULOCYTES # BLD AUTO: 0.13 K/UL (ref 0–0.04)
IMM GRANULOCYTES NFR BLD AUTO: 0.9 % (ref 0–0.5)
LYMPHOCYTES # BLD AUTO: 1.3 K/UL (ref 1–4.8)
LYMPHOCYTES NFR BLD: 8.4 % (ref 18–48)
MAGNESIUM SERPL-MCNC: 2.1 MG/DL (ref 1.6–2.6)
MCH RBC QN AUTO: 29.7 PG (ref 27–31)
MCHC RBC AUTO-ENTMCNC: 33.6 G/DL (ref 32–36)
MCV RBC AUTO: 88 FL (ref 82–98)
MONOCYTES # BLD AUTO: 0.8 K/UL (ref 0.3–1)
MONOCYTES NFR BLD: 5.1 % (ref 4–15)
NEUTROPHILS # BLD AUTO: 12.8 K/UL (ref 1.8–7.7)
NEUTROPHILS NFR BLD: 85 % (ref 38–73)
NRBC BLD-RTO: 0 /100 WBC
PLATELET # BLD AUTO: 299 K/UL (ref 150–450)
PMV BLD AUTO: 10.1 FL (ref 9.2–12.9)
POTASSIUM SERPL-SCNC: 4 MMOL/L (ref 3.5–5.1)
PROT SERPL-MCNC: 6.2 G/DL (ref 6–8.4)
RBC # BLD AUTO: 4.18 M/UL (ref 4.6–6.2)
SODIUM SERPL-SCNC: 132 MMOL/L (ref 136–145)
WBC # BLD AUTO: 15.08 K/UL (ref 3.9–12.7)

## 2021-05-09 PROCEDURE — C9113 INJ PANTOPRAZOLE SODIUM, VIA: HCPCS | Performed by: INTERNAL MEDICINE

## 2021-05-09 PROCEDURE — 25000003 PHARM REV CODE 250: Performed by: STUDENT IN AN ORGANIZED HEALTH CARE EDUCATION/TRAINING PROGRAM

## 2021-05-09 PROCEDURE — 80053 COMPREHEN METABOLIC PANEL: CPT | Performed by: STUDENT IN AN ORGANIZED HEALTH CARE EDUCATION/TRAINING PROGRAM

## 2021-05-09 PROCEDURE — 63600175 PHARM REV CODE 636 W HCPCS: Performed by: SURGERY

## 2021-05-09 PROCEDURE — 63600175 PHARM REV CODE 636 W HCPCS: Performed by: STUDENT IN AN ORGANIZED HEALTH CARE EDUCATION/TRAINING PROGRAM

## 2021-05-09 PROCEDURE — 94640 AIRWAY INHALATION TREATMENT: CPT

## 2021-05-09 PROCEDURE — 94761 N-INVAS EAR/PLS OXIMETRY MLT: CPT

## 2021-05-09 PROCEDURE — 21000000 HC CCU ICU ROOM CHARGE

## 2021-05-09 PROCEDURE — 99900031 HC PATIENT EDUCATION (STAT)

## 2021-05-09 PROCEDURE — 25000003 PHARM REV CODE 250: Performed by: INTERNAL MEDICINE

## 2021-05-09 PROCEDURE — 25000242 PHARM REV CODE 250 ALT 637 W/ HCPCS: Performed by: INTERNAL MEDICINE

## 2021-05-09 PROCEDURE — 85025 COMPLETE CBC W/AUTO DIFF WBC: CPT | Performed by: STUDENT IN AN ORGANIZED HEALTH CARE EDUCATION/TRAINING PROGRAM

## 2021-05-09 PROCEDURE — S0030 INJECTION, METRONIDAZOLE: HCPCS | Performed by: STUDENT IN AN ORGANIZED HEALTH CARE EDUCATION/TRAINING PROGRAM

## 2021-05-09 PROCEDURE — 63600175 PHARM REV CODE 636 W HCPCS: Performed by: INTERNAL MEDICINE

## 2021-05-09 PROCEDURE — 83735 ASSAY OF MAGNESIUM: CPT | Performed by: INTERNAL MEDICINE

## 2021-05-09 PROCEDURE — 36415 COLL VENOUS BLD VENIPUNCTURE: CPT | Performed by: STUDENT IN AN ORGANIZED HEALTH CARE EDUCATION/TRAINING PROGRAM

## 2021-05-09 PROCEDURE — 99900035 HC TECH TIME PER 15 MIN (STAT)

## 2021-05-09 PROCEDURE — 25000003 PHARM REV CODE 250: Performed by: ANESTHESIOLOGY

## 2021-05-09 RX ORDER — METHYLPREDNISOLONE SOD SUCC 125 MG
125 VIAL (EA) INJECTION ONCE
Status: COMPLETED | OUTPATIENT
Start: 2021-05-09 | End: 2021-05-09

## 2021-05-09 RX ORDER — FUROSEMIDE 10 MG/ML
20 INJECTION INTRAMUSCULAR; INTRAVENOUS ONCE
Status: COMPLETED | OUTPATIENT
Start: 2021-05-09 | End: 2021-05-09

## 2021-05-09 RX ORDER — METOCLOPRAMIDE HYDROCHLORIDE 5 MG/ML
10 INJECTION INTRAMUSCULAR; INTRAVENOUS EVERY 6 HOURS
Status: DISCONTINUED | OUTPATIENT
Start: 2021-05-09 | End: 2021-05-12

## 2021-05-09 RX ADMIN — PANTOPRAZOLE SODIUM 40 MG: 40 INJECTION, POWDER, FOR SOLUTION INTRAVENOUS at 09:05

## 2021-05-09 RX ADMIN — ALBUTEROL SULFATE 2.5 MG: 2.5 SOLUTION RESPIRATORY (INHALATION) at 12:05

## 2021-05-09 RX ADMIN — FUROSEMIDE 20 MG: 10 INJECTION, SOLUTION INTRAMUSCULAR; INTRAVENOUS at 01:05

## 2021-05-09 RX ADMIN — METOCLOPRAMIDE 10 MG: 5 INJECTION, SOLUTION INTRAMUSCULAR; INTRAVENOUS at 05:05

## 2021-05-09 RX ADMIN — METOCLOPRAMIDE 10 MG: 5 INJECTION, SOLUTION INTRAMUSCULAR; INTRAVENOUS at 01:05

## 2021-05-09 RX ADMIN — ENOXAPARIN SODIUM 40 MG: 40 INJECTION SUBCUTANEOUS at 01:05

## 2021-05-09 RX ADMIN — METRONIDAZOLE 500 MG: 500 INJECTION, SOLUTION INTRAVENOUS at 09:05

## 2021-05-09 RX ADMIN — LOSARTAN POTASSIUM 50 MG: 50 TABLET, FILM COATED ORAL at 06:05

## 2021-05-09 RX ADMIN — METHYLPREDNISOLONE SODIUM SUCCINATE 125 MG: 125 INJECTION, POWDER, FOR SOLUTION INTRAMUSCULAR; INTRAVENOUS at 01:05

## 2021-05-09 RX ADMIN — AMLODIPINE BESYLATE 5 MG: 5 TABLET ORAL at 09:05

## 2021-05-09 RX ADMIN — METRONIDAZOLE 500 MG: 500 INJECTION, SOLUTION INTRAVENOUS at 06:05

## 2021-05-09 RX ADMIN — OXYCODONE HYDROCHLORIDE 5 MG: 5 TABLET ORAL at 06:05

## 2021-05-09 RX ADMIN — CEFTRIAXONE 2 G: 2 INJECTION, SOLUTION INTRAVENOUS at 03:05

## 2021-05-09 RX ADMIN — TAMSULOSIN HYDROCHLORIDE 0.4 MG: 0.4 CAPSULE ORAL at 01:05

## 2021-05-09 RX ADMIN — CARVEDILOL 12.5 MG: 12.5 TABLET, FILM COATED ORAL at 09:05

## 2021-05-09 RX ADMIN — CLOPIDOGREL BISULFATE 75 MG: 75 TABLET, FILM COATED ORAL at 06:05

## 2021-05-09 RX ADMIN — METRONIDAZOLE 500 MG: 500 INJECTION, SOLUTION INTRAVENOUS at 03:05

## 2021-05-09 RX ADMIN — ALBUTEROL SULFATE 2.5 MG: 2.5 SOLUTION RESPIRATORY (INHALATION) at 04:05

## 2021-05-10 LAB — MAGNESIUM SERPL-MCNC: 2.1 MG/DL (ref 1.6–2.6)

## 2021-05-10 PROCEDURE — 63600175 PHARM REV CODE 636 W HCPCS: Performed by: STUDENT IN AN ORGANIZED HEALTH CARE EDUCATION/TRAINING PROGRAM

## 2021-05-10 PROCEDURE — 27000221 HC OXYGEN, UP TO 24 HOURS

## 2021-05-10 PROCEDURE — S0030 INJECTION, METRONIDAZOLE: HCPCS | Performed by: STUDENT IN AN ORGANIZED HEALTH CARE EDUCATION/TRAINING PROGRAM

## 2021-05-10 PROCEDURE — 36415 COLL VENOUS BLD VENIPUNCTURE: CPT | Performed by: INTERNAL MEDICINE

## 2021-05-10 PROCEDURE — 99900035 HC TECH TIME PER 15 MIN (STAT)

## 2021-05-10 PROCEDURE — 63600175 PHARM REV CODE 636 W HCPCS: Performed by: SURGERY

## 2021-05-10 PROCEDURE — 94640 AIRWAY INHALATION TREATMENT: CPT

## 2021-05-10 PROCEDURE — 94761 N-INVAS EAR/PLS OXIMETRY MLT: CPT

## 2021-05-10 PROCEDURE — 21000000 HC CCU ICU ROOM CHARGE

## 2021-05-10 PROCEDURE — C9113 INJ PANTOPRAZOLE SODIUM, VIA: HCPCS | Performed by: INTERNAL MEDICINE

## 2021-05-10 PROCEDURE — 25000242 PHARM REV CODE 250 ALT 637 W/ HCPCS: Performed by: INTERNAL MEDICINE

## 2021-05-10 PROCEDURE — 99900031 HC PATIENT EDUCATION (STAT)

## 2021-05-10 PROCEDURE — 25000003 PHARM REV CODE 250: Performed by: INTERNAL MEDICINE

## 2021-05-10 PROCEDURE — 83735 ASSAY OF MAGNESIUM: CPT | Performed by: INTERNAL MEDICINE

## 2021-05-10 PROCEDURE — 63600175 PHARM REV CODE 636 W HCPCS: Performed by: INTERNAL MEDICINE

## 2021-05-10 PROCEDURE — 25000003 PHARM REV CODE 250: Performed by: STUDENT IN AN ORGANIZED HEALTH CARE EDUCATION/TRAINING PROGRAM

## 2021-05-10 RX ORDER — OXYCODONE AND ACETAMINOPHEN 5; 325 MG/1; MG/1
1 TABLET ORAL EVERY 4 HOURS PRN
Status: DISCONTINUED | OUTPATIENT
Start: 2021-05-10 | End: 2021-05-13

## 2021-05-10 RX ADMIN — METOCLOPRAMIDE 10 MG: 5 INJECTION, SOLUTION INTRAMUSCULAR; INTRAVENOUS at 01:05

## 2021-05-10 RX ADMIN — METRONIDAZOLE 500 MG: 500 INJECTION, SOLUTION INTRAVENOUS at 03:05

## 2021-05-10 RX ADMIN — LOSARTAN POTASSIUM 50 MG: 50 TABLET, FILM COATED ORAL at 10:05

## 2021-05-10 RX ADMIN — HYDROMORPHONE HYDROCHLORIDE 1 MG: 1 INJECTION, SOLUTION INTRAMUSCULAR; INTRAVENOUS; SUBCUTANEOUS at 07:05

## 2021-05-10 RX ADMIN — CARVEDILOL 12.5 MG: 12.5 TABLET, FILM COATED ORAL at 08:05

## 2021-05-10 RX ADMIN — ALBUTEROL SULFATE 2.5 MG: 2.5 SOLUTION RESPIRATORY (INHALATION) at 01:05

## 2021-05-10 RX ADMIN — AMLODIPINE BESYLATE 5 MG: 5 TABLET ORAL at 08:05

## 2021-05-10 RX ADMIN — ENOXAPARIN SODIUM 40 MG: 40 INJECTION SUBCUTANEOUS at 01:05

## 2021-05-10 RX ADMIN — METOCLOPRAMIDE 10 MG: 5 INJECTION, SOLUTION INTRAMUSCULAR; INTRAVENOUS at 11:05

## 2021-05-10 RX ADMIN — CEFTRIAXONE 2 G: 2 INJECTION, SOLUTION INTRAVENOUS at 02:05

## 2021-05-10 RX ADMIN — CLOPIDOGREL BISULFATE 75 MG: 75 TABLET, FILM COATED ORAL at 10:05

## 2021-05-10 RX ADMIN — METOCLOPRAMIDE 10 MG: 5 INJECTION, SOLUTION INTRAMUSCULAR; INTRAVENOUS at 06:05

## 2021-05-10 RX ADMIN — TAMSULOSIN HYDROCHLORIDE 0.4 MG: 0.4 CAPSULE ORAL at 01:05

## 2021-05-10 RX ADMIN — CARVEDILOL 12.5 MG: 12.5 TABLET, FILM COATED ORAL at 10:05

## 2021-05-10 RX ADMIN — PANTOPRAZOLE SODIUM 40 MG: 40 INJECTION, POWDER, FOR SOLUTION INTRAVENOUS at 10:05

## 2021-05-10 RX ADMIN — ALBUTEROL SULFATE 2.5 MG: 2.5 SOLUTION RESPIRATORY (INHALATION) at 07:05

## 2021-05-10 RX ADMIN — METRONIDAZOLE 500 MG: 500 INJECTION, SOLUTION INTRAVENOUS at 11:05

## 2021-05-10 RX ADMIN — METRONIDAZOLE 500 MG: 500 INJECTION, SOLUTION INTRAVENOUS at 06:05

## 2021-05-11 PROBLEM — C18.1 CARCINOMA OF APPENDIX: Status: ACTIVE | Noted: 2021-05-06

## 2021-05-11 LAB
ALBUMIN SERPL BCP-MCNC: 2.7 G/DL (ref 3.5–5.2)
ALP SERPL-CCNC: 39 U/L (ref 55–135)
ALT SERPL W/O P-5'-P-CCNC: 22 U/L (ref 10–44)
ANION GAP SERPL CALC-SCNC: 11 MMOL/L (ref 8–16)
AST SERPL-CCNC: 25 U/L (ref 10–40)
BACTERIA BLD CULT: NORMAL
BACTERIA BLD CULT: NORMAL
BASOPHILS # BLD AUTO: 0.02 K/UL (ref 0–0.2)
BASOPHILS NFR BLD: 0.2 % (ref 0–1.9)
BILIRUB SERPL-MCNC: 1.1 MG/DL (ref 0.1–1)
BUN SERPL-MCNC: 30 MG/DL (ref 8–23)
CALCIUM SERPL-MCNC: 7.8 MG/DL (ref 8.7–10.5)
CHLORIDE SERPL-SCNC: 97 MMOL/L (ref 95–110)
CO2 SERPL-SCNC: 24 MMOL/L (ref 23–29)
CREAT SERPL-MCNC: 0.8 MG/DL (ref 0.5–1.4)
DIFFERENTIAL METHOD: ABNORMAL
EOSINOPHIL # BLD AUTO: 0.1 K/UL (ref 0–0.5)
EOSINOPHIL NFR BLD: 0.6 % (ref 0–8)
ERYTHROCYTE [DISTWIDTH] IN BLOOD BY AUTOMATED COUNT: 13.1 % (ref 11.5–14.5)
EST. GFR  (AFRICAN AMERICAN): >60 ML/MIN/1.73 M^2
EST. GFR  (NON AFRICAN AMERICAN): >60 ML/MIN/1.73 M^2
GLUCOSE SERPL-MCNC: 104 MG/DL (ref 70–110)
HCT VFR BLD AUTO: 35.3 % (ref 40–54)
HGB BLD-MCNC: 12 G/DL (ref 14–18)
IMM GRANULOCYTES # BLD AUTO: 0.19 K/UL (ref 0–0.04)
IMM GRANULOCYTES NFR BLD AUTO: 1.7 % (ref 0–0.5)
LYMPHOCYTES # BLD AUTO: 1.5 K/UL (ref 1–4.8)
LYMPHOCYTES NFR BLD: 12.9 % (ref 18–48)
MCH RBC QN AUTO: 29.6 PG (ref 27–31)
MCHC RBC AUTO-ENTMCNC: 34 G/DL (ref 32–36)
MCV RBC AUTO: 87 FL (ref 82–98)
MONOCYTES # BLD AUTO: 0.9 K/UL (ref 0.3–1)
MONOCYTES NFR BLD: 8.1 % (ref 4–15)
NEUTROPHILS # BLD AUTO: 8.8 K/UL (ref 1.8–7.7)
NEUTROPHILS NFR BLD: 76.5 % (ref 38–73)
NRBC BLD-RTO: 0 /100 WBC
PLATELET # BLD AUTO: 345 K/UL (ref 150–450)
PMV BLD AUTO: 10.1 FL (ref 9.2–12.9)
POTASSIUM SERPL-SCNC: 3.9 MMOL/L (ref 3.5–5.1)
PROT SERPL-MCNC: 5.3 G/DL (ref 6–8.4)
RBC # BLD AUTO: 4.06 M/UL (ref 4.6–6.2)
SODIUM SERPL-SCNC: 132 MMOL/L (ref 136–145)
WBC # BLD AUTO: 11.47 K/UL (ref 3.9–12.7)

## 2021-05-11 PROCEDURE — S0030 INJECTION, METRONIDAZOLE: HCPCS | Performed by: STUDENT IN AN ORGANIZED HEALTH CARE EDUCATION/TRAINING PROGRAM

## 2021-05-11 PROCEDURE — 99900035 HC TECH TIME PER 15 MIN (STAT)

## 2021-05-11 PROCEDURE — 94640 AIRWAY INHALATION TREATMENT: CPT

## 2021-05-11 PROCEDURE — 21000000 HC CCU ICU ROOM CHARGE

## 2021-05-11 PROCEDURE — 85025 COMPLETE CBC W/AUTO DIFF WBC: CPT | Performed by: INTERNAL MEDICINE

## 2021-05-11 PROCEDURE — 36415 COLL VENOUS BLD VENIPUNCTURE: CPT | Performed by: INTERNAL MEDICINE

## 2021-05-11 PROCEDURE — 80053 COMPREHEN METABOLIC PANEL: CPT | Performed by: INTERNAL MEDICINE

## 2021-05-11 PROCEDURE — 25000242 PHARM REV CODE 250 ALT 637 W/ HCPCS: Performed by: INTERNAL MEDICINE

## 2021-05-11 PROCEDURE — 63600175 PHARM REV CODE 636 W HCPCS: Performed by: STUDENT IN AN ORGANIZED HEALTH CARE EDUCATION/TRAINING PROGRAM

## 2021-05-11 PROCEDURE — 94761 N-INVAS EAR/PLS OXIMETRY MLT: CPT

## 2021-05-11 PROCEDURE — 63600175 PHARM REV CODE 636 W HCPCS: Performed by: SURGERY

## 2021-05-11 PROCEDURE — 99900031 HC PATIENT EDUCATION (STAT)

## 2021-05-11 PROCEDURE — 25000003 PHARM REV CODE 250: Performed by: INTERNAL MEDICINE

## 2021-05-11 PROCEDURE — C9113 INJ PANTOPRAZOLE SODIUM, VIA: HCPCS | Performed by: INTERNAL MEDICINE

## 2021-05-11 PROCEDURE — 63600175 PHARM REV CODE 636 W HCPCS: Performed by: INTERNAL MEDICINE

## 2021-05-11 PROCEDURE — 25000003 PHARM REV CODE 250: Performed by: STUDENT IN AN ORGANIZED HEALTH CARE EDUCATION/TRAINING PROGRAM

## 2021-05-11 RX ORDER — METRONIDAZOLE 250 MG/1
500 TABLET ORAL EVERY 8 HOURS
Status: DISCONTINUED | OUTPATIENT
Start: 2021-05-11 | End: 2021-05-13

## 2021-05-11 RX ORDER — FUROSEMIDE 40 MG/1
40 TABLET ORAL EVERY MORNING
Status: DISCONTINUED | OUTPATIENT
Start: 2021-05-12 | End: 2021-05-13 | Stop reason: HOSPADM

## 2021-05-11 RX ORDER — FUROSEMIDE 40 MG/1
40 TABLET ORAL ONCE
Status: COMPLETED | OUTPATIENT
Start: 2021-05-11 | End: 2021-05-11

## 2021-05-11 RX ORDER — AMLODIPINE BESYLATE 5 MG/1
10 TABLET ORAL NIGHTLY
Status: DISCONTINUED | OUTPATIENT
Start: 2021-05-11 | End: 2021-05-13 | Stop reason: HOSPADM

## 2021-05-11 RX ADMIN — ALBUTEROL SULFATE 2.5 MG: 2.5 SOLUTION RESPIRATORY (INHALATION) at 11:05

## 2021-05-11 RX ADMIN — ALBUTEROL SULFATE 2.5 MG: 2.5 SOLUTION RESPIRATORY (INHALATION) at 02:05

## 2021-05-11 RX ADMIN — METOCLOPRAMIDE 10 MG: 5 INJECTION, SOLUTION INTRAMUSCULAR; INTRAVENOUS at 05:05

## 2021-05-11 RX ADMIN — FUROSEMIDE 40 MG: 40 TABLET ORAL at 07:05

## 2021-05-11 RX ADMIN — METRONIDAZOLE 500 MG: 500 INJECTION, SOLUTION INTRAVENOUS at 05:05

## 2021-05-11 RX ADMIN — CARVEDILOL 12.5 MG: 12.5 TABLET, FILM COATED ORAL at 09:05

## 2021-05-11 RX ADMIN — METOCLOPRAMIDE 10 MG: 5 INJECTION, SOLUTION INTRAMUSCULAR; INTRAVENOUS at 11:05

## 2021-05-11 RX ADMIN — TAMSULOSIN HYDROCHLORIDE 0.4 MG: 0.4 CAPSULE ORAL at 11:05

## 2021-05-11 RX ADMIN — OXYCODONE AND ACETAMINOPHEN 1 TABLET: 5; 325 TABLET ORAL at 05:05

## 2021-05-11 RX ADMIN — ALBUTEROL SULFATE 2.5 MG: 2.5 SOLUTION RESPIRATORY (INHALATION) at 07:05

## 2021-05-11 RX ADMIN — OXYCODONE AND ACETAMINOPHEN 1 TABLET: 5; 325 TABLET ORAL at 09:05

## 2021-05-11 RX ADMIN — CLOPIDOGREL BISULFATE 75 MG: 75 TABLET, FILM COATED ORAL at 08:05

## 2021-05-11 RX ADMIN — CARVEDILOL 12.5 MG: 12.5 TABLET, FILM COATED ORAL at 08:05

## 2021-05-11 RX ADMIN — ALBUTEROL SULFATE 2.5 MG: 2.5 SOLUTION RESPIRATORY (INHALATION) at 12:05

## 2021-05-11 RX ADMIN — LOSARTAN POTASSIUM 50 MG: 50 TABLET, FILM COATED ORAL at 08:05

## 2021-05-11 RX ADMIN — ENOXAPARIN SODIUM 40 MG: 40 INJECTION SUBCUTANEOUS at 03:05

## 2021-05-11 RX ADMIN — METRONIDAZOLE 500 MG: 250 TABLET ORAL at 09:05

## 2021-05-11 RX ADMIN — AMLODIPINE BESYLATE 10 MG: 5 TABLET ORAL at 09:05

## 2021-05-11 RX ADMIN — PANTOPRAZOLE SODIUM 40 MG: 40 INJECTION, POWDER, FOR SOLUTION INTRAVENOUS at 08:05

## 2021-05-11 RX ADMIN — METRONIDAZOLE 500 MG: 500 INJECTION, SOLUTION INTRAVENOUS at 03:05

## 2021-05-11 RX ADMIN — CEFTRIAXONE 2 G: 2 INJECTION, SOLUTION INTRAVENOUS at 03:05

## 2021-05-12 LAB
ALBUMIN SERPL BCP-MCNC: 2.7 G/DL (ref 3.5–5.2)
ALP SERPL-CCNC: 42 U/L (ref 55–135)
ALT SERPL W/O P-5'-P-CCNC: 23 U/L (ref 10–44)
ANION GAP SERPL CALC-SCNC: 13 MMOL/L (ref 8–16)
AST SERPL-CCNC: 24 U/L (ref 10–40)
BASOPHILS # BLD AUTO: 0.02 K/UL (ref 0–0.2)
BASOPHILS NFR BLD: 0.2 % (ref 0–1.9)
BILIRUB SERPL-MCNC: 1.2 MG/DL (ref 0.1–1)
BNP SERPL-MCNC: 114 PG/ML (ref 0–99)
BUN SERPL-MCNC: 22 MG/DL (ref 8–23)
CALCIUM SERPL-MCNC: 7.2 MG/DL (ref 8.7–10.5)
CHLORIDE SERPL-SCNC: 96 MMOL/L (ref 95–110)
CO2 SERPL-SCNC: 22 MMOL/L (ref 23–29)
CREAT SERPL-MCNC: 0.9 MG/DL (ref 0.5–1.4)
CRP SERPL-MCNC: 3.27 MG/DL
DIFFERENTIAL METHOD: ABNORMAL
EOSINOPHIL # BLD AUTO: 0.2 K/UL (ref 0–0.5)
EOSINOPHIL NFR BLD: 1.6 % (ref 0–8)
ERYTHROCYTE [DISTWIDTH] IN BLOOD BY AUTOMATED COUNT: 12.9 % (ref 11.5–14.5)
EST. GFR  (AFRICAN AMERICAN): >60 ML/MIN/1.73 M^2
EST. GFR  (NON AFRICAN AMERICAN): >60 ML/MIN/1.73 M^2
GLUCOSE SERPL-MCNC: 98 MG/DL (ref 70–110)
HCT VFR BLD AUTO: 34.1 % (ref 40–54)
HGB BLD-MCNC: 11.7 G/DL (ref 14–18)
IMM GRANULOCYTES # BLD AUTO: 0.18 K/UL (ref 0–0.04)
IMM GRANULOCYTES NFR BLD AUTO: 1.5 % (ref 0–0.5)
LYMPHOCYTES # BLD AUTO: 1.4 K/UL (ref 1–4.8)
LYMPHOCYTES NFR BLD: 11.9 % (ref 18–48)
MCH RBC QN AUTO: 29.4 PG (ref 27–31)
MCHC RBC AUTO-ENTMCNC: 34.3 G/DL (ref 32–36)
MCV RBC AUTO: 86 FL (ref 82–98)
MONOCYTES # BLD AUTO: 0.9 K/UL (ref 0.3–1)
MONOCYTES NFR BLD: 7.9 % (ref 4–15)
NEUTROPHILS # BLD AUTO: 9 K/UL (ref 1.8–7.7)
NEUTROPHILS NFR BLD: 76.9 % (ref 38–73)
NRBC BLD-RTO: 0 /100 WBC
PLATELET # BLD AUTO: 322 K/UL (ref 150–450)
PMV BLD AUTO: 10 FL (ref 9.2–12.9)
POTASSIUM SERPL-SCNC: 3.5 MMOL/L (ref 3.5–5.1)
PROT SERPL-MCNC: 5.6 G/DL (ref 6–8.4)
RBC # BLD AUTO: 3.98 M/UL (ref 4.6–6.2)
SODIUM SERPL-SCNC: 131 MMOL/L (ref 136–145)
WBC # BLD AUTO: 11.7 K/UL (ref 3.9–12.7)

## 2021-05-12 PROCEDURE — 94761 N-INVAS EAR/PLS OXIMETRY MLT: CPT

## 2021-05-12 PROCEDURE — C9113 INJ PANTOPRAZOLE SODIUM, VIA: HCPCS | Performed by: INTERNAL MEDICINE

## 2021-05-12 PROCEDURE — 25000242 PHARM REV CODE 250 ALT 637 W/ HCPCS: Performed by: INTERNAL MEDICINE

## 2021-05-12 PROCEDURE — 83880 ASSAY OF NATRIURETIC PEPTIDE: CPT | Performed by: INTERNAL MEDICINE

## 2021-05-12 PROCEDURE — 99900031 HC PATIENT EDUCATION (STAT)

## 2021-05-12 PROCEDURE — 25000003 PHARM REV CODE 250: Performed by: INTERNAL MEDICINE

## 2021-05-12 PROCEDURE — 80053 COMPREHEN METABOLIC PANEL: CPT | Performed by: INTERNAL MEDICINE

## 2021-05-12 PROCEDURE — 94640 AIRWAY INHALATION TREATMENT: CPT

## 2021-05-12 PROCEDURE — 63600175 PHARM REV CODE 636 W HCPCS: Performed by: SURGERY

## 2021-05-12 PROCEDURE — 63600175 PHARM REV CODE 636 W HCPCS: Performed by: INTERNAL MEDICINE

## 2021-05-12 PROCEDURE — 36415 COLL VENOUS BLD VENIPUNCTURE: CPT | Performed by: INTERNAL MEDICINE

## 2021-05-12 PROCEDURE — 86140 C-REACTIVE PROTEIN: CPT | Performed by: STUDENT IN AN ORGANIZED HEALTH CARE EDUCATION/TRAINING PROGRAM

## 2021-05-12 PROCEDURE — 99900035 HC TECH TIME PER 15 MIN (STAT)

## 2021-05-12 PROCEDURE — 85025 COMPLETE CBC W/AUTO DIFF WBC: CPT | Performed by: INTERNAL MEDICINE

## 2021-05-12 PROCEDURE — 63600175 PHARM REV CODE 636 W HCPCS: Performed by: STUDENT IN AN ORGANIZED HEALTH CARE EDUCATION/TRAINING PROGRAM

## 2021-05-12 PROCEDURE — 21000000 HC CCU ICU ROOM CHARGE

## 2021-05-12 RX ADMIN — METRONIDAZOLE 500 MG: 250 TABLET ORAL at 02:05

## 2021-05-12 RX ADMIN — TAMSULOSIN HYDROCHLORIDE 0.4 MG: 0.4 CAPSULE ORAL at 11:05

## 2021-05-12 RX ADMIN — METOCLOPRAMIDE 10 MG: 5 INJECTION, SOLUTION INTRAMUSCULAR; INTRAVENOUS at 05:05

## 2021-05-12 RX ADMIN — METRONIDAZOLE 500 MG: 250 TABLET ORAL at 08:05

## 2021-05-12 RX ADMIN — LOSARTAN POTASSIUM 50 MG: 50 TABLET, FILM COATED ORAL at 09:05

## 2021-05-12 RX ADMIN — CEFTRIAXONE 2 G: 2 INJECTION, SOLUTION INTRAVENOUS at 02:05

## 2021-05-12 RX ADMIN — METOCLOPRAMIDE 10 MG: 5 INJECTION, SOLUTION INTRAMUSCULAR; INTRAVENOUS at 11:05

## 2021-05-12 RX ADMIN — OXYCODONE AND ACETAMINOPHEN 1 TABLET: 5; 325 TABLET ORAL at 11:05

## 2021-05-12 RX ADMIN — ALBUTEROL SULFATE 2.5 MG: 2.5 SOLUTION RESPIRATORY (INHALATION) at 11:05

## 2021-05-12 RX ADMIN — CLOPIDOGREL BISULFATE 75 MG: 75 TABLET, FILM COATED ORAL at 09:05

## 2021-05-12 RX ADMIN — CARVEDILOL 12.5 MG: 12.5 TABLET, FILM COATED ORAL at 08:05

## 2021-05-12 RX ADMIN — FUROSEMIDE 40 MG: 40 TABLET ORAL at 09:05

## 2021-05-12 RX ADMIN — METOCLOPRAMIDE 10 MG: 5 INJECTION, SOLUTION INTRAMUSCULAR; INTRAVENOUS at 12:05

## 2021-05-12 RX ADMIN — AMLODIPINE BESYLATE 10 MG: 5 TABLET ORAL at 08:05

## 2021-05-12 RX ADMIN — OXYCODONE AND ACETAMINOPHEN 1 TABLET: 5; 325 TABLET ORAL at 03:05

## 2021-05-12 RX ADMIN — ALBUTEROL SULFATE 2.5 MG: 2.5 SOLUTION RESPIRATORY (INHALATION) at 03:05

## 2021-05-12 RX ADMIN — METRONIDAZOLE 500 MG: 250 TABLET ORAL at 05:05

## 2021-05-12 RX ADMIN — CARVEDILOL 12.5 MG: 12.5 TABLET, FILM COATED ORAL at 09:05

## 2021-05-12 RX ADMIN — PANTOPRAZOLE SODIUM 40 MG: 40 INJECTION, POWDER, FOR SOLUTION INTRAVENOUS at 09:05

## 2021-05-12 RX ADMIN — ENOXAPARIN SODIUM 40 MG: 40 INJECTION SUBCUTANEOUS at 02:05

## 2021-05-12 RX ADMIN — ALBUTEROL SULFATE 2.5 MG: 2.5 SOLUTION RESPIRATORY (INHALATION) at 08:05

## 2021-05-12 RX ADMIN — OXYCODONE AND ACETAMINOPHEN 1 TABLET: 5; 325 TABLET ORAL at 06:05

## 2021-05-13 VITALS
DIASTOLIC BLOOD PRESSURE: 83 MMHG | TEMPERATURE: 98 F | WEIGHT: 234.38 LBS | RESPIRATION RATE: 16 BRPM | HEART RATE: 94 BPM | BODY MASS INDEX: 36.79 KG/M2 | OXYGEN SATURATION: 98 % | SYSTOLIC BLOOD PRESSURE: 152 MMHG | HEIGHT: 67 IN

## 2021-05-13 LAB
ALBUMIN SERPL BCP-MCNC: 2.7 G/DL (ref 3.5–5.2)
ALP SERPL-CCNC: 44 U/L (ref 55–135)
ALT SERPL W/O P-5'-P-CCNC: 19 U/L (ref 10–44)
ANION GAP SERPL CALC-SCNC: 10 MMOL/L (ref 8–16)
AST SERPL-CCNC: 19 U/L (ref 10–40)
BASOPHILS # BLD AUTO: 0.06 K/UL (ref 0–0.2)
BASOPHILS NFR BLD: 0.5 % (ref 0–1.9)
BILIRUB SERPL-MCNC: 1.1 MG/DL (ref 0.1–1)
BUN SERPL-MCNC: 14 MG/DL (ref 8–23)
CALCIUM SERPL-MCNC: 7.5 MG/DL (ref 8.7–10.5)
CHLORIDE SERPL-SCNC: 97 MMOL/L (ref 95–110)
CO2 SERPL-SCNC: 26 MMOL/L (ref 23–29)
CREAT SERPL-MCNC: 0.9 MG/DL (ref 0.5–1.4)
CRP SERPL-MCNC: 2.2 MG/DL
DIFFERENTIAL METHOD: ABNORMAL
EOSINOPHIL # BLD AUTO: 0.2 K/UL (ref 0–0.5)
EOSINOPHIL NFR BLD: 1.3 % (ref 0–8)
ERYTHROCYTE [DISTWIDTH] IN BLOOD BY AUTOMATED COUNT: 13.1 % (ref 11.5–14.5)
EST. GFR  (AFRICAN AMERICAN): >60 ML/MIN/1.73 M^2
EST. GFR  (NON AFRICAN AMERICAN): >60 ML/MIN/1.73 M^2
GLUCOSE SERPL-MCNC: 111 MG/DL (ref 70–110)
HCT VFR BLD AUTO: 34.2 % (ref 40–54)
HGB BLD-MCNC: 11.8 G/DL (ref 14–18)
IMM GRANULOCYTES # BLD AUTO: 0.3 K/UL (ref 0–0.04)
IMM GRANULOCYTES NFR BLD AUTO: 2.5 % (ref 0–0.5)
LYMPHOCYTES # BLD AUTO: 1.8 K/UL (ref 1–4.8)
LYMPHOCYTES NFR BLD: 15.3 % (ref 18–48)
MCH RBC QN AUTO: 29.9 PG (ref 27–31)
MCHC RBC AUTO-ENTMCNC: 34.5 G/DL (ref 32–36)
MCV RBC AUTO: 87 FL (ref 82–98)
MONOCYTES # BLD AUTO: 0.8 K/UL (ref 0.3–1)
MONOCYTES NFR BLD: 6.4 % (ref 4–15)
NEUTROPHILS # BLD AUTO: 8.8 K/UL (ref 1.8–7.7)
NEUTROPHILS NFR BLD: 74 % (ref 38–73)
NRBC BLD-RTO: 0 /100 WBC
PLATELET # BLD AUTO: 345 K/UL (ref 150–450)
PMV BLD AUTO: 10 FL (ref 9.2–12.9)
POTASSIUM SERPL-SCNC: 3.6 MMOL/L (ref 3.5–5.1)
PROT SERPL-MCNC: 5.5 G/DL (ref 6–8.4)
RBC # BLD AUTO: 3.95 M/UL (ref 4.6–6.2)
SODIUM SERPL-SCNC: 133 MMOL/L (ref 136–145)
WBC # BLD AUTO: 11.83 K/UL (ref 3.9–12.7)

## 2021-05-13 PROCEDURE — 86140 C-REACTIVE PROTEIN: CPT | Performed by: STUDENT IN AN ORGANIZED HEALTH CARE EDUCATION/TRAINING PROGRAM

## 2021-05-13 PROCEDURE — 99900031 HC PATIENT EDUCATION (STAT)

## 2021-05-13 PROCEDURE — 36415 COLL VENOUS BLD VENIPUNCTURE: CPT | Performed by: INTERNAL MEDICINE

## 2021-05-13 PROCEDURE — 85025 COMPLETE CBC W/AUTO DIFF WBC: CPT | Performed by: INTERNAL MEDICINE

## 2021-05-13 PROCEDURE — 25000003 PHARM REV CODE 250: Performed by: INTERNAL MEDICINE

## 2021-05-13 PROCEDURE — 94761 N-INVAS EAR/PLS OXIMETRY MLT: CPT

## 2021-05-13 PROCEDURE — 80053 COMPREHEN METABOLIC PANEL: CPT | Performed by: INTERNAL MEDICINE

## 2021-05-13 PROCEDURE — 25000242 PHARM REV CODE 250 ALT 637 W/ HCPCS: Performed by: INTERNAL MEDICINE

## 2021-05-13 PROCEDURE — 99900035 HC TECH TIME PER 15 MIN (STAT)

## 2021-05-13 PROCEDURE — 94640 AIRWAY INHALATION TREATMENT: CPT

## 2021-05-13 RX ORDER — FUROSEMIDE 40 MG/1
40 TABLET ORAL EVERY MORNING
Qty: 30 TABLET | Refills: 11 | Status: SHIPPED | OUTPATIENT
Start: 2021-05-14 | End: 2021-07-21

## 2021-05-13 RX ORDER — ALBUTEROL SULFATE 90 UG/1
2 AEROSOL, METERED RESPIRATORY (INHALATION) EVERY 8 HOURS
Status: DISCONTINUED | OUTPATIENT
Start: 2021-05-13 | End: 2021-05-13 | Stop reason: HOSPADM

## 2021-05-13 RX ORDER — ALBUTEROL SULFATE 90 UG/1
2 AEROSOL, METERED RESPIRATORY (INHALATION) EVERY 6 HOURS PRN
Start: 2021-05-13

## 2021-05-13 RX ORDER — AMLODIPINE BESYLATE 10 MG/1
10 TABLET ORAL NIGHTLY
Qty: 30 TABLET | Refills: 11
Start: 2021-05-13 | End: 2023-12-26

## 2021-05-13 RX ADMIN — CLOPIDOGREL BISULFATE 75 MG: 75 TABLET, FILM COATED ORAL at 09:05

## 2021-05-13 RX ADMIN — FUROSEMIDE 40 MG: 40 TABLET ORAL at 09:05

## 2021-05-13 RX ADMIN — METRONIDAZOLE 500 MG: 250 TABLET ORAL at 06:05

## 2021-05-13 RX ADMIN — LOSARTAN POTASSIUM 50 MG: 50 TABLET, FILM COATED ORAL at 09:05

## 2021-05-13 RX ADMIN — ALBUTEROL SULFATE 2.5 MG: 2.5 SOLUTION RESPIRATORY (INHALATION) at 08:05

## 2021-05-13 RX ADMIN — CARVEDILOL 12.5 MG: 12.5 TABLET, FILM COATED ORAL at 09:05

## 2021-05-14 ENCOUNTER — TELEPHONE (OUTPATIENT)
Dept: SURGERY | Facility: CLINIC | Age: 76
End: 2021-05-14

## 2021-05-17 ENCOUNTER — HOSPITAL ENCOUNTER (EMERGENCY)
Facility: HOSPITAL | Age: 76
Discharge: HOME | End: 2021-05-17
Attending: EMERGENCY MEDICINE
Payer: MEDICARE

## 2021-05-17 VITALS
TEMPERATURE: 99 F | HEART RATE: 80 BPM | RESPIRATION RATE: 16 BRPM | WEIGHT: 225 LBS | DIASTOLIC BLOOD PRESSURE: 81 MMHG | OXYGEN SATURATION: 97 % | BODY MASS INDEX: 35.31 KG/M2 | HEIGHT: 67 IN | SYSTOLIC BLOOD PRESSURE: 160 MMHG

## 2021-05-17 DIAGNOSIS — R33.9 URINARY RETENTION: Primary | ICD-10-CM

## 2021-05-17 LAB
ALBUMIN SERPL BCP-MCNC: 2.9 G/DL (ref 3.5–5.2)
ALP SERPL-CCNC: 43 U/L (ref 55–135)
ALT SERPL W/O P-5'-P-CCNC: 24 U/L (ref 10–44)
ANION GAP SERPL CALC-SCNC: 9 MMOL/L (ref 8–16)
AST SERPL-CCNC: 26 U/L (ref 10–40)
BACTERIA #/AREA URNS HPF: NEGATIVE /HPF
BASOPHILS # BLD AUTO: 0.04 K/UL (ref 0–0.2)
BASOPHILS NFR BLD: 0.4 % (ref 0–1.9)
BILIRUB SERPL-MCNC: 1.2 MG/DL (ref 0.1–1)
BILIRUB UR QL STRIP: NEGATIVE
BUN SERPL-MCNC: 22 MG/DL (ref 8–23)
CALCIUM SERPL-MCNC: 7.7 MG/DL (ref 8.7–10.5)
CHLORIDE SERPL-SCNC: 99 MMOL/L (ref 95–110)
CLARITY UR: CLEAR
CO2 SERPL-SCNC: 26 MMOL/L (ref 23–29)
COLOR UR: YELLOW
CREAT SERPL-MCNC: 0.9 MG/DL (ref 0.5–1.4)
DIFFERENTIAL METHOD: ABNORMAL
EOSINOPHIL # BLD AUTO: 0 K/UL (ref 0–0.5)
EOSINOPHIL NFR BLD: 0.4 % (ref 0–8)
ERYTHROCYTE [DISTWIDTH] IN BLOOD BY AUTOMATED COUNT: 13.8 % (ref 11.5–14.5)
EST. GFR  (AFRICAN AMERICAN): >60 ML/MIN/1.73 M^2
EST. GFR  (NON AFRICAN AMERICAN): >60 ML/MIN/1.73 M^2
GLUCOSE SERPL-MCNC: 121 MG/DL (ref 70–110)
GLUCOSE UR QL STRIP: NEGATIVE
HCT VFR BLD AUTO: 32.9 % (ref 40–54)
HGB BLD-MCNC: 11.3 G/DL (ref 14–18)
HGB UR QL STRIP: ABNORMAL
HYALINE CASTS #/AREA URNS LPF: 3 /LPF
IMM GRANULOCYTES # BLD AUTO: 0.12 K/UL (ref 0–0.04)
IMM GRANULOCYTES NFR BLD AUTO: 1.2 % (ref 0–0.5)
INR PPP: 1.1
KETONES UR QL STRIP: NEGATIVE
LEUKOCYTE ESTERASE UR QL STRIP: NEGATIVE
LIPASE SERPL-CCNC: 59 U/L (ref 4–60)
LYMPHOCYTES # BLD AUTO: 0.9 K/UL (ref 1–4.8)
LYMPHOCYTES NFR BLD: 8.6 % (ref 18–48)
MAGNESIUM SERPL-MCNC: 1.6 MG/DL (ref 1.6–2.6)
MCH RBC QN AUTO: 30.5 PG (ref 27–31)
MCHC RBC AUTO-ENTMCNC: 34.3 G/DL (ref 32–36)
MCV RBC AUTO: 89 FL (ref 82–98)
MICROSCOPIC COMMENT: ABNORMAL
MONOCYTES # BLD AUTO: 0.5 K/UL (ref 0.3–1)
MONOCYTES NFR BLD: 4.9 % (ref 4–15)
NEUTROPHILS # BLD AUTO: 8.5 K/UL (ref 1.8–7.7)
NEUTROPHILS NFR BLD: 84.5 % (ref 38–73)
NITRITE UR QL STRIP: NEGATIVE
NRBC BLD-RTO: 0 /100 WBC
PH UR STRIP: 7 [PH] (ref 5–8)
PLATELET # BLD AUTO: 245 K/UL (ref 150–450)
PMV BLD AUTO: 10.8 FL (ref 9.2–12.9)
POTASSIUM SERPL-SCNC: 4.1 MMOL/L (ref 3.5–5.1)
PROT SERPL-MCNC: 5.8 G/DL (ref 6–8.4)
PROT UR QL STRIP: NEGATIVE
PROTHROMBIN TIME: 13.8 SEC (ref 11.8–14.3)
RBC # BLD AUTO: 3.71 M/UL (ref 4.6–6.2)
RBC #/AREA URNS HPF: 82 /HPF (ref 0–4)
SODIUM SERPL-SCNC: 134 MMOL/L (ref 136–145)
SP GR UR STRIP: 1.01 (ref 1–1.03)
SQUAMOUS #/AREA URNS HPF: 1 /HPF
URN SPEC COLLECT METH UR: ABNORMAL
UROBILINOGEN UR STRIP-ACNC: NEGATIVE EU/DL
WBC # BLD AUTO: 10 K/UL (ref 3.9–12.7)
WBC #/AREA URNS HPF: 1 /HPF (ref 0–5)

## 2021-05-17 PROCEDURE — 99284 EMERGENCY DEPT VISIT MOD MDM: CPT | Mod: 25

## 2021-05-17 PROCEDURE — 80053 COMPREHEN METABOLIC PANEL: CPT | Performed by: EMERGENCY MEDICINE

## 2021-05-17 PROCEDURE — 51702 INSERT TEMP BLADDER CATH: CPT

## 2021-05-17 PROCEDURE — 81001 URINALYSIS AUTO W/SCOPE: CPT | Performed by: EMERGENCY MEDICINE

## 2021-05-17 PROCEDURE — 51798 US URINE CAPACITY MEASURE: CPT

## 2021-05-17 PROCEDURE — 83690 ASSAY OF LIPASE: CPT | Performed by: EMERGENCY MEDICINE

## 2021-05-17 PROCEDURE — 83735 ASSAY OF MAGNESIUM: CPT | Performed by: EMERGENCY MEDICINE

## 2021-05-17 PROCEDURE — 85610 PROTHROMBIN TIME: CPT | Performed by: EMERGENCY MEDICINE

## 2021-05-17 PROCEDURE — 85025 COMPLETE CBC W/AUTO DIFF WBC: CPT | Performed by: EMERGENCY MEDICINE

## 2021-05-17 RX ORDER — CEPHALEXIN 500 MG/1
500 CAPSULE ORAL 2 TIMES DAILY
Qty: 14 CAPSULE | Refills: 0 | Status: SHIPPED | OUTPATIENT
Start: 2021-05-17 | End: 2021-05-24

## 2021-05-18 ENCOUNTER — OFFICE VISIT (OUTPATIENT)
Dept: UROLOGY | Facility: CLINIC | Age: 76
End: 2021-05-18
Payer: MEDICARE

## 2021-05-18 ENCOUNTER — TELEPHONE (OUTPATIENT)
Dept: UROLOGY | Facility: CLINIC | Age: 76
End: 2021-05-18

## 2021-05-18 VITALS
BODY MASS INDEX: 36.17 KG/M2 | HEIGHT: 66 IN | HEART RATE: 71 BPM | SYSTOLIC BLOOD PRESSURE: 105 MMHG | WEIGHT: 225.06 LBS | DIASTOLIC BLOOD PRESSURE: 74 MMHG

## 2021-05-18 DIAGNOSIS — Z97.8 FOLEY CATHETER IN PLACE PRIOR TO ARRIVAL: ICD-10-CM

## 2021-05-18 DIAGNOSIS — R33.9 URINARY RETENTION: ICD-10-CM

## 2021-05-18 DIAGNOSIS — C18.1 CARCINOMA OF APPENDIX: Primary | ICD-10-CM

## 2021-05-18 DIAGNOSIS — N40.0 BENIGN PROSTATIC HYPERPLASIA WITHOUT LOWER URINARY TRACT SYMPTOMS: Primary | ICD-10-CM

## 2021-05-18 PROCEDURE — 99999 PR PBB SHADOW E&M-EST. PATIENT-LVL IV: CPT | Mod: PBBFAC,,, | Performed by: NURSE PRACTITIONER

## 2021-05-18 PROCEDURE — 99214 PR OFFICE/OUTPT VISIT, EST, LEVL IV, 30-39 MIN: ICD-10-PCS | Mod: S$PBB,,, | Performed by: NURSE PRACTITIONER

## 2021-05-18 PROCEDURE — 99214 OFFICE O/P EST MOD 30 MIN: CPT | Mod: PBBFAC,PN | Performed by: NURSE PRACTITIONER

## 2021-05-18 PROCEDURE — 99214 OFFICE O/P EST MOD 30 MIN: CPT | Mod: S$PBB,,, | Performed by: NURSE PRACTITIONER

## 2021-05-18 PROCEDURE — 99999 PR PBB SHADOW E&M-EST. PATIENT-LVL IV: ICD-10-PCS | Mod: PBBFAC,,, | Performed by: NURSE PRACTITIONER

## 2021-05-18 RX ORDER — TAMSULOSIN HYDROCHLORIDE 0.4 MG/1
1 CAPSULE ORAL DAILY
Qty: 90 CAPSULE | Refills: 3 | Status: SHIPPED | OUTPATIENT
Start: 2021-05-18 | End: 2021-05-24 | Stop reason: SDUPTHER

## 2021-05-21 ENCOUNTER — TELEPHONE (OUTPATIENT)
Dept: HEMATOLOGY/ONCOLOGY | Facility: CLINIC | Age: 76
End: 2021-05-21

## 2021-05-21 ENCOUNTER — CLINICAL SUPPORT (OUTPATIENT)
Dept: UROLOGY | Facility: CLINIC | Age: 76
End: 2021-05-21
Payer: MEDICARE

## 2021-05-21 DIAGNOSIS — Z97.8 FOLEY CATHETER IN PLACE PRIOR TO ARRIVAL: Primary | ICD-10-CM

## 2021-05-21 PROCEDURE — 99499 UNLISTED E&M SERVICE: CPT | Mod: S$PBB,,, | Performed by: NURSE PRACTITIONER

## 2021-05-21 PROCEDURE — 99499 NO LOS: ICD-10-PCS | Mod: S$PBB,,, | Performed by: NURSE PRACTITIONER

## 2021-05-24 ENCOUNTER — CLINICAL SUPPORT (OUTPATIENT)
Dept: UROLOGY | Facility: CLINIC | Age: 76
End: 2021-05-24
Payer: MEDICARE

## 2021-05-24 DIAGNOSIS — N40.0 BENIGN PROSTATIC HYPERPLASIA WITHOUT LOWER URINARY TRACT SYMPTOMS: ICD-10-CM

## 2021-05-24 RX ORDER — FINASTERIDE 5 MG/1
5 TABLET, FILM COATED ORAL DAILY
Qty: 30 TABLET | Refills: 12 | Status: SHIPPED | OUTPATIENT
Start: 2021-05-24 | End: 2022-01-21

## 2021-05-24 RX ORDER — TAMSULOSIN HYDROCHLORIDE 0.4 MG/1
0.8 CAPSULE ORAL DAILY
Qty: 180 CAPSULE | Refills: 3 | Status: SHIPPED | OUTPATIENT
Start: 2021-05-24 | End: 2021-11-01

## 2021-05-25 ENCOUNTER — HOSPITAL ENCOUNTER (EMERGENCY)
Facility: HOSPITAL | Age: 76
Discharge: HOME | End: 2021-05-25
Attending: EMERGENCY MEDICINE
Payer: MEDICARE

## 2021-05-25 VITALS
BODY MASS INDEX: 36.16 KG/M2 | DIASTOLIC BLOOD PRESSURE: 87 MMHG | SYSTOLIC BLOOD PRESSURE: 163 MMHG | WEIGHT: 225 LBS | RESPIRATION RATE: 16 BRPM | OXYGEN SATURATION: 96 % | HEIGHT: 66 IN | HEART RATE: 75 BPM | TEMPERATURE: 98 F

## 2021-05-25 DIAGNOSIS — R33.8 ACUTE URINARY RETENTION: Primary | ICD-10-CM

## 2021-05-25 LAB
ANION GAP SERPL CALC-SCNC: 8 MMOL/L (ref 8–16)
BACTERIA #/AREA URNS HPF: NEGATIVE /HPF
BASOPHILS # BLD AUTO: 0.03 K/UL (ref 0–0.2)
BASOPHILS NFR BLD: 0.5 % (ref 0–1.9)
BILIRUB UR QL STRIP: NEGATIVE
BUN SERPL-MCNC: 17 MG/DL (ref 8–23)
CALCIUM SERPL-MCNC: 8 MG/DL (ref 8.7–10.5)
CHLORIDE SERPL-SCNC: 100 MMOL/L (ref 95–110)
CLARITY UR: ABNORMAL
CO2 SERPL-SCNC: 24 MMOL/L (ref 23–29)
COLOR UR: YELLOW
CREAT SERPL-MCNC: 0.8 MG/DL (ref 0.5–1.4)
DIFFERENTIAL METHOD: ABNORMAL
EOSINOPHIL # BLD AUTO: 0 K/UL (ref 0–0.5)
EOSINOPHIL NFR BLD: 0.7 % (ref 0–8)
ERYTHROCYTE [DISTWIDTH] IN BLOOD BY AUTOMATED COUNT: 14.3 % (ref 11.5–14.5)
EST. GFR  (AFRICAN AMERICAN): >60 ML/MIN/1.73 M^2
EST. GFR  (NON AFRICAN AMERICAN): >60 ML/MIN/1.73 M^2
GLUCOSE SERPL-MCNC: 129 MG/DL (ref 70–110)
GLUCOSE UR QL STRIP: NEGATIVE
HCT VFR BLD AUTO: 34.4 % (ref 40–54)
HGB BLD-MCNC: 11.6 G/DL (ref 14–18)
HGB UR QL STRIP: ABNORMAL
HYALINE CASTS #/AREA URNS LPF: 2 /LPF
IMM GRANULOCYTES # BLD AUTO: 0.02 K/UL (ref 0–0.04)
IMM GRANULOCYTES NFR BLD AUTO: 0.3 % (ref 0–0.5)
KETONES UR QL STRIP: NEGATIVE
LEUKOCYTE ESTERASE UR QL STRIP: NEGATIVE
LYMPHOCYTES # BLD AUTO: 1.3 K/UL (ref 1–4.8)
LYMPHOCYTES NFR BLD: 20.7 % (ref 18–48)
MCH RBC QN AUTO: 29.9 PG (ref 27–31)
MCHC RBC AUTO-ENTMCNC: 33.7 G/DL (ref 32–36)
MCV RBC AUTO: 89 FL (ref 82–98)
MICROSCOPIC COMMENT: ABNORMAL
MONOCYTES # BLD AUTO: 0.6 K/UL (ref 0.3–1)
MONOCYTES NFR BLD: 9.2 % (ref 4–15)
NEUTROPHILS # BLD AUTO: 4.2 K/UL (ref 1.8–7.7)
NEUTROPHILS NFR BLD: 68.6 % (ref 38–73)
NITRITE UR QL STRIP: NEGATIVE
NRBC BLD-RTO: 0 /100 WBC
PH UR STRIP: 6 [PH] (ref 5–8)
PLATELET # BLD AUTO: 249 K/UL (ref 150–450)
PMV BLD AUTO: 10.1 FL (ref 9.2–12.9)
POTASSIUM SERPL-SCNC: 3.9 MMOL/L (ref 3.5–5.1)
PROT UR QL STRIP: ABNORMAL
RBC # BLD AUTO: 3.88 M/UL (ref 4.6–6.2)
RBC #/AREA URNS HPF: >100 /HPF (ref 0–4)
SODIUM SERPL-SCNC: 132 MMOL/L (ref 136–145)
SP GR UR STRIP: 1.01 (ref 1–1.03)
SQUAMOUS #/AREA URNS HPF: 0 /HPF
URN SPEC COLLECT METH UR: ABNORMAL
UROBILINOGEN UR STRIP-ACNC: NEGATIVE EU/DL
WBC # BLD AUTO: 6.08 K/UL (ref 3.9–12.7)
WBC #/AREA URNS HPF: 3 /HPF (ref 0–5)

## 2021-05-25 PROCEDURE — 81001 URINALYSIS AUTO W/SCOPE: CPT | Performed by: EMERGENCY MEDICINE

## 2021-05-25 PROCEDURE — 85025 COMPLETE CBC W/AUTO DIFF WBC: CPT | Performed by: EMERGENCY MEDICINE

## 2021-05-25 PROCEDURE — 80048 BASIC METABOLIC PNL TOTAL CA: CPT | Performed by: EMERGENCY MEDICINE

## 2021-05-25 PROCEDURE — 99284 EMERGENCY DEPT VISIT MOD MDM: CPT | Mod: 25

## 2021-05-25 PROCEDURE — 51798 US URINE CAPACITY MEASURE: CPT

## 2021-05-25 PROCEDURE — 51702 INSERT TEMP BLADDER CATH: CPT

## 2021-05-26 ENCOUNTER — OFFICE VISIT (OUTPATIENT)
Dept: UROLOGY | Facility: CLINIC | Age: 76
End: 2021-05-26
Payer: MEDICARE

## 2021-05-26 VITALS
BODY MASS INDEX: 34.43 KG/M2 | DIASTOLIC BLOOD PRESSURE: 90 MMHG | HEART RATE: 88 BPM | WEIGHT: 213.31 LBS | SYSTOLIC BLOOD PRESSURE: 146 MMHG

## 2021-05-26 DIAGNOSIS — Z97.8 FOLEY CATHETER IN PLACE PRIOR TO ARRIVAL: ICD-10-CM

## 2021-05-26 DIAGNOSIS — R33.9 URINARY RETENTION: Primary | ICD-10-CM

## 2021-05-26 DIAGNOSIS — R31.0 GROSS HEMATURIA: ICD-10-CM

## 2021-05-26 PROCEDURE — 99999 PR PBB SHADOW E&M-EST. PATIENT-LVL IV: CPT | Mod: PBBFAC,,, | Performed by: UROLOGY

## 2021-05-26 PROCEDURE — 99214 OFFICE O/P EST MOD 30 MIN: CPT | Mod: PBBFAC,PN | Performed by: UROLOGY

## 2021-05-26 PROCEDURE — 99999 PR PBB SHADOW E&M-EST. PATIENT-LVL IV: ICD-10-PCS | Mod: PBBFAC,,, | Performed by: UROLOGY

## 2021-05-26 PROCEDURE — 99214 PR OFFICE/OUTPT VISIT, EST, LEVL IV, 30-39 MIN: ICD-10-PCS | Mod: S$PBB,,, | Performed by: UROLOGY

## 2021-05-26 PROCEDURE — 99214 OFFICE O/P EST MOD 30 MIN: CPT | Mod: S$PBB,,, | Performed by: UROLOGY

## 2021-05-27 ENCOUNTER — OFFICE VISIT (OUTPATIENT)
Dept: SURGERY | Facility: CLINIC | Age: 76
End: 2021-05-27
Payer: MEDICARE

## 2021-05-27 VITALS
DIASTOLIC BLOOD PRESSURE: 87 MMHG | BODY MASS INDEX: 34.23 KG/M2 | HEART RATE: 76 BPM | TEMPERATURE: 98 F | HEIGHT: 66 IN | WEIGHT: 213 LBS | SYSTOLIC BLOOD PRESSURE: 163 MMHG

## 2021-05-27 DIAGNOSIS — Z98.890 POST-OPERATIVE STATE: Primary | ICD-10-CM

## 2021-05-27 PROCEDURE — 99024 PR POST-OP FOLLOW-UP VISIT: ICD-10-PCS | Mod: S$GLB,POP,, | Performed by: PHYSICIAN ASSISTANT

## 2021-05-27 PROCEDURE — 99024 POSTOP FOLLOW-UP VISIT: CPT | Mod: S$GLB,POP,, | Performed by: PHYSICIAN ASSISTANT

## 2021-06-02 ENCOUNTER — HOSPITAL ENCOUNTER (OUTPATIENT)
Facility: AMBULARY SURGERY CENTER | Age: 76
Discharge: HOME | End: 2021-06-02
Attending: UROLOGY | Admitting: UROLOGY
Payer: MEDICARE

## 2021-06-02 ENCOUNTER — TELEPHONE (OUTPATIENT)
Dept: CARDIOLOGY | Facility: CLINIC | Age: 76
End: 2021-06-02

## 2021-06-02 DIAGNOSIS — N40.1 ENLARGED PROSTATE WITH LOWER URINARY TRACT SYMPTOMS (LUTS): ICD-10-CM

## 2021-06-02 DIAGNOSIS — R33.9 URINARY RETENTION: Primary | ICD-10-CM

## 2021-06-02 PROCEDURE — 52000 PR CYSTOURETHROSCOPY: ICD-10-PCS | Mod: ,,, | Performed by: UROLOGY

## 2021-06-02 PROCEDURE — 87088 URINE BACTERIA CULTURE: CPT | Performed by: UROLOGY

## 2021-06-02 PROCEDURE — 87186 SC STD MICRODIL/AGAR DIL: CPT | Performed by: UROLOGY

## 2021-06-02 PROCEDURE — 76872 US TRANSRECTAL: CPT | Mod: 26,,, | Performed by: UROLOGY

## 2021-06-02 PROCEDURE — 87086 URINE CULTURE/COLONY COUNT: CPT | Performed by: UROLOGY

## 2021-06-02 PROCEDURE — 76872 PR US TRANSRECTAL: ICD-10-PCS | Mod: 26,,, | Performed by: UROLOGY

## 2021-06-02 PROCEDURE — 88112 PR  CYTOPATH, CELL ENHANCE TECH: ICD-10-PCS | Mod: 26,,, | Performed by: PATHOLOGY

## 2021-06-02 PROCEDURE — 87077 CULTURE AEROBIC IDENTIFY: CPT | Performed by: UROLOGY

## 2021-06-02 PROCEDURE — 76872 US TRANSRECTAL: CPT | Performed by: UROLOGY

## 2021-06-02 PROCEDURE — 52000 CYSTOURETHROSCOPY: CPT | Mod: ,,, | Performed by: UROLOGY

## 2021-06-02 PROCEDURE — 52000 CYSTOURETHROSCOPY: CPT | Performed by: UROLOGY

## 2021-06-02 PROCEDURE — 88112 CYTOPATH CELL ENHANCE TECH: CPT | Performed by: PATHOLOGY

## 2021-06-02 PROCEDURE — 88112 CYTOPATH CELL ENHANCE TECH: CPT | Mod: 26,,, | Performed by: PATHOLOGY

## 2021-06-02 RX ORDER — LIDOCAINE HYDROCHLORIDE 20 MG/ML
JELLY TOPICAL
Status: DISCONTINUED | OUTPATIENT
Start: 2021-06-02 | End: 2021-06-02 | Stop reason: HOSPADM

## 2021-06-02 RX ORDER — WATER 1 ML/ML
IRRIGANT IRRIGATION
Status: DISCONTINUED | OUTPATIENT
Start: 2021-06-02 | End: 2021-06-02 | Stop reason: HOSPADM

## 2021-06-02 RX ORDER — AMPICILLIN 500 MG/1
500 CAPSULE ORAL 2 TIMES DAILY
Qty: 6 CAPSULE | Refills: 0 | Status: SHIPPED | OUTPATIENT
Start: 2021-06-02 | End: 2021-06-05

## 2021-06-03 VITALS
WEIGHT: 213 LBS | TEMPERATURE: 97 F | SYSTOLIC BLOOD PRESSURE: 153 MMHG | BODY MASS INDEX: 34.23 KG/M2 | RESPIRATION RATE: 16 BRPM | HEART RATE: 81 BPM | DIASTOLIC BLOOD PRESSURE: 78 MMHG | OXYGEN SATURATION: 100 % | HEIGHT: 66 IN

## 2021-06-04 ENCOUNTER — HOSPITAL ENCOUNTER (OUTPATIENT)
Dept: PREADMISSION TESTING | Facility: HOSPITAL | Age: 76
Discharge: HOME | End: 2021-06-04
Attending: INTERNAL MEDICINE
Payer: MEDICARE

## 2021-06-04 DIAGNOSIS — R94.31 NONSPECIFIC ABNORMAL ELECTROCARDIOGRAM (ECG) (EKG): Primary | ICD-10-CM

## 2021-06-04 DIAGNOSIS — R94.31 NONSPECIFIC ABNORMAL ELECTROCARDIOGRAM (ECG) (EKG): ICD-10-CM

## 2021-06-04 LAB
FINAL PATHOLOGIC DIAGNOSIS: NORMAL
Lab: NORMAL

## 2021-06-04 PROCEDURE — 93005 ELECTROCARDIOGRAM TRACING: CPT | Performed by: INTERNAL MEDICINE

## 2021-06-04 PROCEDURE — 93010 ELECTROCARDIOGRAM REPORT: CPT | Mod: ,,, | Performed by: INTERNAL MEDICINE

## 2021-06-04 PROCEDURE — 93010 EKG 12-LEAD: ICD-10-PCS | Mod: ,,, | Performed by: INTERNAL MEDICINE

## 2021-06-07 LAB — BACTERIA UR CULT: ABNORMAL

## 2021-06-09 ENCOUNTER — TELEPHONE (OUTPATIENT)
Dept: UROLOGY | Facility: CLINIC | Age: 76
End: 2021-06-09

## 2021-06-11 ENCOUNTER — PATIENT MESSAGE (OUTPATIENT)
Dept: UROLOGY | Facility: CLINIC | Age: 76
End: 2021-06-11

## 2021-06-11 RX ORDER — AMPICILLIN 500 MG/1
500 CAPSULE ORAL 3 TIMES DAILY
Qty: 15 CAPSULE | Refills: 0 | Status: SHIPPED | OUTPATIENT
Start: 2021-06-11 | End: 2021-06-16

## 2021-06-15 VITALS — BODY MASS INDEX: 34.38 KG/M2 | WEIGHT: 213 LBS

## 2021-06-16 ENCOUNTER — HOSPITAL ENCOUNTER (OUTPATIENT)
Dept: PREADMISSION TESTING | Facility: HOSPITAL | Age: 76
Discharge: HOME | End: 2021-06-16
Payer: MEDICARE

## 2021-06-21 ENCOUNTER — ANESTHESIA EVENT (OUTPATIENT)
Dept: SURGERY | Facility: HOSPITAL | Age: 76
End: 2021-06-21
Payer: MEDICARE

## 2021-06-22 ENCOUNTER — ANESTHESIA (OUTPATIENT)
Dept: SURGERY | Facility: HOSPITAL | Age: 76
End: 2021-06-22
Payer: MEDICARE

## 2021-06-22 ENCOUNTER — HOSPITAL ENCOUNTER (OUTPATIENT)
Facility: HOSPITAL | Age: 76
Discharge: HOME | End: 2021-06-24
Attending: UROLOGY | Admitting: HOSPITALIST
Payer: MEDICARE

## 2021-06-22 DIAGNOSIS — N40.1 ENLARGED PROSTATE WITH LOWER URINARY TRACT SYMPTOMS (LUTS): Primary | ICD-10-CM

## 2021-06-22 DIAGNOSIS — R33.9 URINARY RETENTION: ICD-10-CM

## 2021-06-22 PROBLEM — R33.8 BENIGN PROSTATIC HYPERPLASIA WITH URINARY RETENTION: Status: ACTIVE | Noted: 2021-06-22

## 2021-06-22 PROBLEM — E66.9 CLASS 1 OBESITY IN ADULT: Status: ACTIVE | Noted: 2021-06-22

## 2021-06-22 PROBLEM — E66.811 CLASS 1 OBESITY IN ADULT: Status: ACTIVE | Noted: 2021-06-22

## 2021-06-22 LAB
ALBUMIN SERPL BCP-MCNC: 3.1 G/DL (ref 3.5–5.2)
ALP SERPL-CCNC: 56 U/L (ref 55–135)
ALT SERPL W/O P-5'-P-CCNC: 14 U/L (ref 10–44)
ANION GAP SERPL CALC-SCNC: 9 MMOL/L (ref 8–16)
AST SERPL-CCNC: 12 U/L (ref 10–40)
BASOPHILS # BLD AUTO: 0.01 K/UL (ref 0–0.2)
BASOPHILS NFR BLD: 0.1 % (ref 0–1.9)
BILIRUB SERPL-MCNC: 1.3 MG/DL (ref 0.1–1)
BUN SERPL-MCNC: 11 MG/DL (ref 8–23)
CALCIUM SERPL-MCNC: 8.3 MG/DL (ref 8.7–10.5)
CHLORIDE SERPL-SCNC: 103 MMOL/L (ref 95–110)
CO2 SERPL-SCNC: 24 MMOL/L (ref 23–29)
CREAT SERPL-MCNC: 0.8 MG/DL (ref 0.5–1.4)
DIFFERENTIAL METHOD: ABNORMAL
EOSINOPHIL # BLD AUTO: 0 K/UL (ref 0–0.5)
EOSINOPHIL NFR BLD: 0 % (ref 0–8)
ERYTHROCYTE [DISTWIDTH] IN BLOOD BY AUTOMATED COUNT: 13.7 % (ref 11.5–14.5)
EST. GFR  (AFRICAN AMERICAN): >60 ML/MIN/1.73 M^2
EST. GFR  (NON AFRICAN AMERICAN): >60 ML/MIN/1.73 M^2
GLUCOSE SERPL-MCNC: 191 MG/DL (ref 70–110)
HCT VFR BLD AUTO: 36.1 % (ref 40–54)
HGB BLD-MCNC: 11.9 G/DL (ref 14–18)
IMM GRANULOCYTES # BLD AUTO: 0.02 K/UL (ref 0–0.04)
IMM GRANULOCYTES NFR BLD AUTO: 0.2 % (ref 0–0.5)
LYMPHOCYTES # BLD AUTO: 1.4 K/UL (ref 1–4.8)
LYMPHOCYTES NFR BLD: 15.2 % (ref 18–48)
MAGNESIUM SERPL-MCNC: 1.9 MG/DL (ref 1.6–2.6)
MCH RBC QN AUTO: 29.6 PG (ref 27–31)
MCHC RBC AUTO-ENTMCNC: 33 G/DL (ref 32–36)
MCV RBC AUTO: 90 FL (ref 82–98)
MONOCYTES # BLD AUTO: 0.2 K/UL (ref 0.3–1)
MONOCYTES NFR BLD: 2.5 % (ref 4–15)
NEUTROPHILS # BLD AUTO: 7.5 K/UL (ref 1.8–7.7)
NEUTROPHILS NFR BLD: 82 % (ref 38–73)
NRBC BLD-RTO: 0 /100 WBC
PHOSPHATE SERPL-MCNC: 2.1 MG/DL (ref 2.7–4.5)
PLATELET # BLD AUTO: 232 K/UL (ref 150–450)
PMV BLD AUTO: 10.4 FL (ref 9.2–12.9)
POTASSIUM SERPL-SCNC: 3.9 MMOL/L (ref 3.5–5.1)
PROT SERPL-MCNC: 5.8 G/DL (ref 6–8.4)
RBC # BLD AUTO: 4.02 M/UL (ref 4.6–6.2)
SODIUM SERPL-SCNC: 136 MMOL/L (ref 136–145)
WBC # BLD AUTO: 9.09 K/UL (ref 3.9–12.7)

## 2021-06-22 PROCEDURE — 25000003 PHARM REV CODE 250: Performed by: UROLOGY

## 2021-06-22 PROCEDURE — 84100 ASSAY OF PHOSPHORUS: CPT | Performed by: HOSPITALIST

## 2021-06-22 PROCEDURE — 37000009 HC ANESTHESIA EA ADD 15 MINS: Performed by: UROLOGY

## 2021-06-22 PROCEDURE — 63600175 PHARM REV CODE 636 W HCPCS: Performed by: NURSE ANESTHETIST, CERTIFIED REGISTERED

## 2021-06-22 PROCEDURE — 71000039 HC RECOVERY, EACH ADD'L HOUR: Performed by: UROLOGY

## 2021-06-22 PROCEDURE — 71000033 HC RECOVERY, INTIAL HOUR: Performed by: UROLOGY

## 2021-06-22 PROCEDURE — 25000003 PHARM REV CODE 250: Performed by: NURSE ANESTHETIST, CERTIFIED REGISTERED

## 2021-06-22 PROCEDURE — 37000008 HC ANESTHESIA 1ST 15 MINUTES: Performed by: UROLOGY

## 2021-06-22 PROCEDURE — 88305 TISSUE EXAM BY PATHOLOGIST: CPT | Performed by: PATHOLOGY

## 2021-06-22 PROCEDURE — D9220A PRA ANESTHESIA: Mod: ANES,,, | Performed by: ANESTHESIOLOGY

## 2021-06-22 PROCEDURE — 94799 UNLISTED PULMONARY SVC/PX: CPT

## 2021-06-22 PROCEDURE — 99900104 DSU ONLY-NO CHARGE-EA ADD'L HR (STAT): Performed by: UROLOGY

## 2021-06-22 PROCEDURE — 27200651 HC AIRWAY, LMA: Performed by: ANESTHESIOLOGY

## 2021-06-22 PROCEDURE — 94761 N-INVAS EAR/PLS OXIMETRY MLT: CPT

## 2021-06-22 PROCEDURE — 63600175 PHARM REV CODE 636 W HCPCS: Performed by: UROLOGY

## 2021-06-22 PROCEDURE — 88305 TISSUE EXAM BY PATHOLOGIST: ICD-10-PCS | Mod: 26,,, | Performed by: PATHOLOGY

## 2021-06-22 PROCEDURE — 25000003 PHARM REV CODE 250: Performed by: HOSPITALIST

## 2021-06-22 PROCEDURE — D9220A PRA ANESTHESIA: ICD-10-PCS | Mod: ANES,,, | Performed by: ANESTHESIOLOGY

## 2021-06-22 PROCEDURE — 52601 PR TRANSURETHRAL ELEC-SURG PROSTATECTOM: ICD-10-PCS | Mod: 22,,, | Performed by: UROLOGY

## 2021-06-22 PROCEDURE — 80053 COMPREHEN METABOLIC PANEL: CPT | Performed by: HOSPITALIST

## 2021-06-22 PROCEDURE — 27201423 OPTIME MED/SURG SUP & DEVICES STERILE SUPPLY: Performed by: UROLOGY

## 2021-06-22 PROCEDURE — D9220A PRA ANESTHESIA: Mod: CRNA,,, | Performed by: NURSE ANESTHETIST, CERTIFIED REGISTERED

## 2021-06-22 PROCEDURE — 99900103 DSU ONLY-NO CHARGE-INITIAL HR (STAT)

## 2021-06-22 PROCEDURE — 25000003 PHARM REV CODE 250: Performed by: ANESTHESIOLOGY

## 2021-06-22 PROCEDURE — 36000707: Performed by: UROLOGY

## 2021-06-22 PROCEDURE — 88305 TISSUE EXAM BY PATHOLOGIST: CPT | Mod: 26,,, | Performed by: PATHOLOGY

## 2021-06-22 PROCEDURE — 99900035 HC TECH TIME PER 15 MIN (STAT)

## 2021-06-22 PROCEDURE — 85025 COMPLETE CBC W/AUTO DIFF WBC: CPT | Performed by: HOSPITALIST

## 2021-06-22 PROCEDURE — 63600175 PHARM REV CODE 636 W HCPCS: Performed by: ANESTHESIOLOGY

## 2021-06-22 PROCEDURE — 36000706: Performed by: UROLOGY

## 2021-06-22 PROCEDURE — 99900103 DSU ONLY-NO CHARGE-INITIAL HR (STAT): Performed by: UROLOGY

## 2021-06-22 PROCEDURE — 36415 COLL VENOUS BLD VENIPUNCTURE: CPT | Performed by: HOSPITALIST

## 2021-06-22 PROCEDURE — D9220A PRA ANESTHESIA: ICD-10-PCS | Mod: CRNA,,, | Performed by: NURSE ANESTHETIST, CERTIFIED REGISTERED

## 2021-06-22 PROCEDURE — 52601 PROSTATECTOMY (TURP): CPT | Mod: 22,,, | Performed by: UROLOGY

## 2021-06-22 PROCEDURE — 83735 ASSAY OF MAGNESIUM: CPT | Performed by: HOSPITALIST

## 2021-06-22 RX ORDER — ACETAMINOPHEN 325 MG/1
650 TABLET ORAL EVERY 4 HOURS PRN
Status: DISCONTINUED | OUTPATIENT
Start: 2021-06-22 | End: 2021-06-24 | Stop reason: HOSPADM

## 2021-06-22 RX ORDER — LIDOCAINE HCL/PF 100 MG/5ML
SYRINGE (ML) INTRAVENOUS
Status: DISCONTINUED | OUTPATIENT
Start: 2021-06-22 | End: 2021-06-22

## 2021-06-22 RX ORDER — FUROSEMIDE 40 MG/1
40 TABLET ORAL EVERY MORNING
Status: DISCONTINUED | OUTPATIENT
Start: 2021-06-23 | End: 2021-06-24 | Stop reason: HOSPADM

## 2021-06-22 RX ORDER — CARVEDILOL 6.25 MG/1
12.5 TABLET ORAL 2 TIMES DAILY
Status: DISCONTINUED | OUTPATIENT
Start: 2021-06-22 | End: 2021-06-24 | Stop reason: HOSPADM

## 2021-06-22 RX ORDER — LIDOCAINE HYDROCHLORIDE 20 MG/ML
JELLY TOPICAL
Status: DISCONTINUED | OUTPATIENT
Start: 2021-06-22 | End: 2021-06-24 | Stop reason: HOSPADM

## 2021-06-22 RX ORDER — ONDANSETRON 2 MG/ML
INJECTION INTRAMUSCULAR; INTRAVENOUS
Status: DISCONTINUED | OUTPATIENT
Start: 2021-06-22 | End: 2021-06-22

## 2021-06-22 RX ORDER — CARVEDILOL 6.25 MG/1
12.5 TABLET ORAL 2 TIMES DAILY
Status: DISCONTINUED | OUTPATIENT
Start: 2021-06-23 | End: 2021-06-22

## 2021-06-22 RX ORDER — DEXAMETHASONE SODIUM PHOSPHATE 4 MG/ML
INJECTION, SOLUTION INTRA-ARTICULAR; INTRALESIONAL; INTRAMUSCULAR; INTRAVENOUS; SOFT TISSUE
Status: DISCONTINUED | OUTPATIENT
Start: 2021-06-22 | End: 2021-06-22

## 2021-06-22 RX ORDER — OXYCODONE HYDROCHLORIDE 5 MG/1
5 TABLET ORAL ONCE AS NEEDED
Status: COMPLETED | OUTPATIENT
Start: 2021-06-22 | End: 2021-06-22

## 2021-06-22 RX ORDER — MIDAZOLAM HYDROCHLORIDE 1 MG/ML
INJECTION INTRAMUSCULAR; INTRAVENOUS
Status: DISCONTINUED | OUTPATIENT
Start: 2021-06-22 | End: 2021-06-22

## 2021-06-22 RX ORDER — ONDANSETRON 2 MG/ML
4 INJECTION INTRAMUSCULAR; INTRAVENOUS EVERY 12 HOURS PRN
Status: DISCONTINUED | OUTPATIENT
Start: 2021-06-22 | End: 2021-06-24 | Stop reason: HOSPADM

## 2021-06-22 RX ORDER — HYDROCODONE BITARTRATE AND ACETAMINOPHEN 5; 325 MG/1; MG/1
1 TABLET ORAL EVERY 4 HOURS PRN
Status: DISCONTINUED | OUTPATIENT
Start: 2021-06-22 | End: 2021-06-24 | Stop reason: HOSPADM

## 2021-06-22 RX ORDER — PROPOFOL 10 MG/ML
INJECTION, EMULSION INTRAVENOUS
Status: DISCONTINUED | OUTPATIENT
Start: 2021-06-22 | End: 2021-06-22

## 2021-06-22 RX ORDER — PHENYLEPHRINE HYDROCHLORIDE 10 MG/ML
INJECTION INTRAVENOUS
Status: DISCONTINUED | OUTPATIENT
Start: 2021-06-22 | End: 2021-06-22

## 2021-06-22 RX ORDER — ACETAMINOPHEN 10 MG/ML
INJECTION, SOLUTION INTRAVENOUS
Status: DISCONTINUED | OUTPATIENT
Start: 2021-06-22 | End: 2021-06-22

## 2021-06-22 RX ORDER — TAMSULOSIN HYDROCHLORIDE 0.4 MG/1
0.8 CAPSULE ORAL DAILY
Status: DISCONTINUED | OUTPATIENT
Start: 2021-06-23 | End: 2021-06-24 | Stop reason: HOSPADM

## 2021-06-22 RX ORDER — ONDANSETRON 2 MG/ML
4 INJECTION INTRAMUSCULAR; INTRAVENOUS ONCE AS NEEDED
Status: DISCONTINUED | OUTPATIENT
Start: 2021-06-22 | End: 2021-06-22 | Stop reason: HOSPADM

## 2021-06-22 RX ORDER — FINASTERIDE 5 MG/1
5 TABLET, FILM COATED ORAL DAILY
Status: DISCONTINUED | OUTPATIENT
Start: 2021-06-22 | End: 2021-06-24 | Stop reason: HOSPADM

## 2021-06-22 RX ORDER — LIDOCAINE HYDROCHLORIDE 10 MG/ML
1 INJECTION, SOLUTION EPIDURAL; INFILTRATION; INTRACAUDAL; PERINEURAL ONCE
Status: DISCONTINUED | OUTPATIENT
Start: 2021-06-22 | End: 2021-06-22 | Stop reason: HOSPADM

## 2021-06-22 RX ORDER — ATROPA BELLADONNA AND OPIUM 16.2; 3 MG/1; MG/1
30 SUPPOSITORY RECTAL DAILY PRN
Status: DISCONTINUED | OUTPATIENT
Start: 2021-06-22 | End: 2021-06-24 | Stop reason: HOSPADM

## 2021-06-22 RX ORDER — SODIUM CHLORIDE, SODIUM LACTATE, POTASSIUM CHLORIDE, CALCIUM CHLORIDE 600; 310; 30; 20 MG/100ML; MG/100ML; MG/100ML; MG/100ML
INJECTION, SOLUTION INTRAVENOUS CONTINUOUS
Status: DISCONTINUED | OUTPATIENT
Start: 2021-06-22 | End: 2021-06-23

## 2021-06-22 RX ORDER — ALBUTEROL SULFATE 90 UG/1
2 AEROSOL, METERED RESPIRATORY (INHALATION) EVERY 6 HOURS PRN
Status: DISCONTINUED | OUTPATIENT
Start: 2021-06-22 | End: 2021-06-24 | Stop reason: HOSPADM

## 2021-06-22 RX ORDER — AMLODIPINE BESYLATE 5 MG/1
10 TABLET ORAL NIGHTLY
Status: DISCONTINUED | OUTPATIENT
Start: 2021-06-23 | End: 2021-06-24 | Stop reason: HOSPADM

## 2021-06-22 RX ORDER — FENTANYL CITRATE 50 UG/ML
25 INJECTION, SOLUTION INTRAMUSCULAR; INTRAVENOUS EVERY 5 MIN PRN
Status: DISCONTINUED | OUTPATIENT
Start: 2021-06-22 | End: 2021-06-22 | Stop reason: HOSPADM

## 2021-06-22 RX ORDER — FENTANYL CITRATE 50 UG/ML
INJECTION, SOLUTION INTRAMUSCULAR; INTRAVENOUS
Status: DISCONTINUED | OUTPATIENT
Start: 2021-06-22 | End: 2021-06-22

## 2021-06-22 RX ADMIN — PROPOFOL 100 MG: 10 INJECTION, EMULSION INTRAVENOUS at 07:06

## 2021-06-22 RX ADMIN — FENTANYL CITRATE 25 MCG: 50 INJECTION INTRAMUSCULAR; INTRAVENOUS at 09:06

## 2021-06-22 RX ADMIN — SODIUM CHLORIDE, SODIUM LACTATE, POTASSIUM CHLORIDE, AND CALCIUM CHLORIDE: .6; .31; .03; .02 INJECTION, SOLUTION INTRAVENOUS at 10:06

## 2021-06-22 RX ADMIN — CEFAZOLIN 2 G: 1 INJECTION, POWDER, FOR SOLUTION INTRAVENOUS at 07:06

## 2021-06-22 RX ADMIN — FINASTERIDE 5 MG: 5 TABLET, FILM COATED ORAL at 05:06

## 2021-06-22 RX ADMIN — ONDANSETRON 4 MG: 2 INJECTION, SOLUTION INTRAMUSCULAR; INTRAVENOUS at 07:06

## 2021-06-22 RX ADMIN — CEFAZOLIN 2 G: 1 INJECTION, POWDER, FOR SOLUTION INTRAVENOUS at 11:06

## 2021-06-22 RX ADMIN — LIDOCAINE HYDROCHLORIDE 80 MG: 20 INJECTION, SOLUTION INTRAVENOUS at 07:06

## 2021-06-22 RX ADMIN — OXYCODONE 5 MG: 5 TABLET ORAL at 09:06

## 2021-06-22 RX ADMIN — FENTANYL CITRATE 25 MCG: 50 INJECTION, SOLUTION INTRAMUSCULAR; INTRAVENOUS at 07:06

## 2021-06-22 RX ADMIN — ACETAMINOPHEN 1000 MG: 10 INJECTION, SOLUTION INTRAVENOUS at 07:06

## 2021-06-22 RX ADMIN — MIDAZOLAM HYDROCHLORIDE 1 MG: 1 INJECTION, SOLUTION INTRAMUSCULAR; INTRAVENOUS at 06:06

## 2021-06-22 RX ADMIN — FENTANYL CITRATE 50 MCG: 50 INJECTION, SOLUTION INTRAMUSCULAR; INTRAVENOUS at 07:06

## 2021-06-22 RX ADMIN — PHENYLEPHRINE HYDROCHLORIDE 100 MCG: 10 INJECTION INTRAVENOUS at 08:06

## 2021-06-22 RX ADMIN — PHENYLEPHRINE HYDROCHLORIDE 100 MCG: 10 INJECTION INTRAVENOUS at 07:06

## 2021-06-22 RX ADMIN — CARVEDILOL 12.5 MG: 6.25 TABLET, FILM COATED ORAL at 09:06

## 2021-06-22 RX ADMIN — CEFAZOLIN 2 G: 1 INJECTION, POWDER, FOR SOLUTION INTRAVENOUS at 05:06

## 2021-06-22 RX ADMIN — SODIUM CHLORIDE, SODIUM GLUCONATE, SODIUM ACETATE, POTASSIUM CHLORIDE, MAGNESIUM CHLORIDE, SODIUM PHOSPHATE, DIBASIC, AND POTASSIUM PHOSPHATE: .53; .5; .37; .037; .03; .012; .00082 INJECTION, SOLUTION INTRAVENOUS at 06:06

## 2021-06-22 RX ADMIN — DEXAMETHASONE SODIUM PHOSPHATE 4 MG: 4 INJECTION, SOLUTION INTRA-ARTICULAR; INTRALESIONAL; INTRAMUSCULAR; INTRAVENOUS; SOFT TISSUE at 07:06

## 2021-06-23 LAB
ALBUMIN SERPL BCP-MCNC: 2.9 G/DL (ref 3.5–5.2)
ALP SERPL-CCNC: 53 U/L (ref 55–135)
ALT SERPL W/O P-5'-P-CCNC: 10 U/L (ref 10–44)
ANION GAP SERPL CALC-SCNC: 7 MMOL/L (ref 8–16)
AST SERPL-CCNC: 12 U/L (ref 10–40)
BASOPHILS # BLD AUTO: 0.02 K/UL (ref 0–0.2)
BASOPHILS NFR BLD: 0.1 % (ref 0–1.9)
BILIRUB SERPL-MCNC: 0.7 MG/DL (ref 0.1–1)
BUN SERPL-MCNC: 11 MG/DL (ref 8–23)
CALCIUM SERPL-MCNC: 8.2 MG/DL (ref 8.7–10.5)
CHLORIDE SERPL-SCNC: 105 MMOL/L (ref 95–110)
CO2 SERPL-SCNC: 24 MMOL/L (ref 23–29)
CREAT SERPL-MCNC: 0.8 MG/DL (ref 0.5–1.4)
DIFFERENTIAL METHOD: ABNORMAL
EOSINOPHIL # BLD AUTO: 0 K/UL (ref 0–0.5)
EOSINOPHIL NFR BLD: 0 % (ref 0–8)
ERYTHROCYTE [DISTWIDTH] IN BLOOD BY AUTOMATED COUNT: 14.1 % (ref 11.5–14.5)
EST. GFR  (AFRICAN AMERICAN): >60 ML/MIN/1.73 M^2
EST. GFR  (NON AFRICAN AMERICAN): >60 ML/MIN/1.73 M^2
GLUCOSE SERPL-MCNC: 116 MG/DL (ref 70–110)
HCT VFR BLD AUTO: 33.8 % (ref 40–54)
HGB BLD-MCNC: 10.8 G/DL (ref 14–18)
IMM GRANULOCYTES # BLD AUTO: 0.05 K/UL (ref 0–0.04)
IMM GRANULOCYTES NFR BLD AUTO: 0.4 % (ref 0–0.5)
LYMPHOCYTES # BLD AUTO: 2.5 K/UL (ref 1–4.8)
LYMPHOCYTES NFR BLD: 18.3 % (ref 18–48)
MAGNESIUM SERPL-MCNC: 1.9 MG/DL (ref 1.6–2.6)
MCH RBC QN AUTO: 29.4 PG (ref 27–31)
MCHC RBC AUTO-ENTMCNC: 32 G/DL (ref 32–36)
MCV RBC AUTO: 92 FL (ref 82–98)
MONOCYTES # BLD AUTO: 0.9 K/UL (ref 0.3–1)
MONOCYTES NFR BLD: 6.6 % (ref 4–15)
NEUTROPHILS # BLD AUTO: 10 K/UL (ref 1.8–7.7)
NEUTROPHILS NFR BLD: 74.6 % (ref 38–73)
NRBC BLD-RTO: 0 /100 WBC
PHOSPHATE SERPL-MCNC: 3.3 MG/DL (ref 2.7–4.5)
PLATELET # BLD AUTO: 218 K/UL (ref 150–450)
PMV BLD AUTO: 10.3 FL (ref 9.2–12.9)
POTASSIUM SERPL-SCNC: 4.6 MMOL/L (ref 3.5–5.1)
PROT SERPL-MCNC: 5.3 G/DL (ref 6–8.4)
RBC # BLD AUTO: 3.67 M/UL (ref 4.6–6.2)
SODIUM SERPL-SCNC: 136 MMOL/L (ref 136–145)
WBC # BLD AUTO: 13.38 K/UL (ref 3.9–12.7)

## 2021-06-23 PROCEDURE — 94761 N-INVAS EAR/PLS OXIMETRY MLT: CPT

## 2021-06-23 PROCEDURE — 63600175 PHARM REV CODE 636 W HCPCS: Performed by: UROLOGY

## 2021-06-23 PROCEDURE — 25000003 PHARM REV CODE 250: Performed by: UROLOGY

## 2021-06-23 PROCEDURE — 94799 UNLISTED PULMONARY SVC/PX: CPT

## 2021-06-23 PROCEDURE — 36415 COLL VENOUS BLD VENIPUNCTURE: CPT | Performed by: HOSPITALIST

## 2021-06-23 PROCEDURE — 83735 ASSAY OF MAGNESIUM: CPT | Performed by: HOSPITALIST

## 2021-06-23 PROCEDURE — 80053 COMPREHEN METABOLIC PANEL: CPT | Performed by: HOSPITALIST

## 2021-06-23 PROCEDURE — 85025 COMPLETE CBC W/AUTO DIFF WBC: CPT | Performed by: HOSPITALIST

## 2021-06-23 PROCEDURE — 25000003 PHARM REV CODE 250: Performed by: HOSPITALIST

## 2021-06-23 PROCEDURE — 84100 ASSAY OF PHOSPHORUS: CPT | Performed by: HOSPITALIST

## 2021-06-23 PROCEDURE — G0378 HOSPITAL OBSERVATION PER HR: HCPCS

## 2021-06-23 PROCEDURE — 99900035 HC TECH TIME PER 15 MIN (STAT)

## 2021-06-23 RX ORDER — CEPHALEXIN 500 MG/1
500 CAPSULE ORAL EVERY 12 HOURS
Qty: 10 CAPSULE | Refills: 0 | Status: SHIPPED | OUTPATIENT
Start: 2021-06-23 | End: 2021-06-28

## 2021-06-23 RX ORDER — ASPIRIN 81 MG/1
81 TABLET ORAL EVERY MORNING
Refills: 0
Start: 2021-06-23

## 2021-06-23 RX ORDER — CLOPIDOGREL BISULFATE 75 MG/1
75 TABLET ORAL EVERY MORNING
Status: ON HOLD
Start: 2021-06-23 | End: 2023-11-11 | Stop reason: SDUPTHER

## 2021-06-23 RX ADMIN — TAMSULOSIN HYDROCHLORIDE 0.8 MG: 0.4 CAPSULE ORAL at 09:06

## 2021-06-23 RX ADMIN — CARVEDILOL 12.5 MG: 6.25 TABLET, FILM COATED ORAL at 08:06

## 2021-06-23 RX ADMIN — FINASTERIDE 5 MG: 5 TABLET, FILM COATED ORAL at 09:06

## 2021-06-23 RX ADMIN — CEFAZOLIN 2 G: 1 INJECTION, POWDER, FOR SOLUTION INTRAVENOUS at 06:06

## 2021-06-23 RX ADMIN — AMLODIPINE BESYLATE 10 MG: 5 TABLET ORAL at 08:06

## 2021-06-24 LAB
ALBUMIN SERPL BCP-MCNC: 2.9 G/DL (ref 3.5–5.2)
ALP SERPL-CCNC: 54 U/L (ref 55–135)
ALT SERPL W/O P-5'-P-CCNC: 9 U/L (ref 10–44)
ANION GAP SERPL CALC-SCNC: 8 MMOL/L (ref 8–16)
AST SERPL-CCNC: 11 U/L (ref 10–40)
BASOPHILS # BLD AUTO: 0.06 K/UL (ref 0–0.2)
BASOPHILS NFR BLD: 0.5 % (ref 0–1.9)
BILIRUB SERPL-MCNC: 0.9 MG/DL (ref 0.1–1)
BUN SERPL-MCNC: 13 MG/DL (ref 8–23)
CALCIUM SERPL-MCNC: 8.1 MG/DL (ref 8.7–10.5)
CHLORIDE SERPL-SCNC: 103 MMOL/L (ref 95–110)
CO2 SERPL-SCNC: 25 MMOL/L (ref 23–29)
CREAT SERPL-MCNC: 0.8 MG/DL (ref 0.5–1.4)
DIFFERENTIAL METHOD: ABNORMAL
EOSINOPHIL # BLD AUTO: 0.1 K/UL (ref 0–0.5)
EOSINOPHIL NFR BLD: 0.4 % (ref 0–8)
ERYTHROCYTE [DISTWIDTH] IN BLOOD BY AUTOMATED COUNT: 14.3 % (ref 11.5–14.5)
EST. GFR  (AFRICAN AMERICAN): >60 ML/MIN/1.73 M^2
EST. GFR  (NON AFRICAN AMERICAN): >60 ML/MIN/1.73 M^2
FINAL PATHOLOGIC DIAGNOSIS: NORMAL
GLUCOSE SERPL-MCNC: 106 MG/DL (ref 70–110)
GROSS: NORMAL
HCT VFR BLD AUTO: 33.7 % (ref 40–54)
HGB BLD-MCNC: 11.1 G/DL (ref 14–18)
IMM GRANULOCYTES # BLD AUTO: 0.04 K/UL (ref 0–0.04)
IMM GRANULOCYTES NFR BLD AUTO: 0.3 % (ref 0–0.5)
LYMPHOCYTES # BLD AUTO: 2.4 K/UL (ref 1–4.8)
LYMPHOCYTES NFR BLD: 19.9 % (ref 18–48)
Lab: NORMAL
MAGNESIUM SERPL-MCNC: 1.8 MG/DL (ref 1.6–2.6)
MCH RBC QN AUTO: 29.6 PG (ref 27–31)
MCHC RBC AUTO-ENTMCNC: 32.9 G/DL (ref 32–36)
MCV RBC AUTO: 90 FL (ref 82–98)
MONOCYTES # BLD AUTO: 0.9 K/UL (ref 0.3–1)
MONOCYTES NFR BLD: 7.9 % (ref 4–15)
NEUTROPHILS # BLD AUTO: 8.4 K/UL (ref 1.8–7.7)
NEUTROPHILS NFR BLD: 71 % (ref 38–73)
NRBC BLD-RTO: 0 /100 WBC
PHOSPHATE SERPL-MCNC: 2.9 MG/DL (ref 2.7–4.5)
PLATELET # BLD AUTO: 205 K/UL (ref 150–450)
PMV BLD AUTO: 10.4 FL (ref 9.2–12.9)
POTASSIUM SERPL-SCNC: 3.8 MMOL/L (ref 3.5–5.1)
PROT SERPL-MCNC: 5.6 G/DL (ref 6–8.4)
RBC # BLD AUTO: 3.75 M/UL (ref 4.6–6.2)
SODIUM SERPL-SCNC: 136 MMOL/L (ref 136–145)
WBC # BLD AUTO: 11.9 K/UL (ref 3.9–12.7)

## 2021-06-24 PROCEDURE — 25000003 PHARM REV CODE 250: Performed by: UROLOGY

## 2021-06-24 PROCEDURE — 25000003 PHARM REV CODE 250: Performed by: HOSPITALIST

## 2021-06-24 PROCEDURE — 83735 ASSAY OF MAGNESIUM: CPT | Performed by: HOSPITALIST

## 2021-06-24 PROCEDURE — 36415 COLL VENOUS BLD VENIPUNCTURE: CPT | Performed by: HOSPITALIST

## 2021-06-24 PROCEDURE — 85025 COMPLETE CBC W/AUTO DIFF WBC: CPT | Performed by: HOSPITALIST

## 2021-06-24 PROCEDURE — 94761 N-INVAS EAR/PLS OXIMETRY MLT: CPT

## 2021-06-24 PROCEDURE — 84100 ASSAY OF PHOSPHORUS: CPT | Performed by: HOSPITALIST

## 2021-06-24 PROCEDURE — G0378 HOSPITAL OBSERVATION PER HR: HCPCS

## 2021-06-24 PROCEDURE — 80053 COMPREHEN METABOLIC PANEL: CPT | Performed by: HOSPITALIST

## 2021-06-24 RX ADMIN — CARVEDILOL 12.5 MG: 6.25 TABLET, FILM COATED ORAL at 10:06

## 2021-06-24 RX ADMIN — FINASTERIDE 5 MG: 5 TABLET, FILM COATED ORAL at 10:06

## 2021-06-24 RX ADMIN — TAMSULOSIN HYDROCHLORIDE 0.8 MG: 0.4 CAPSULE ORAL at 10:06

## 2021-06-25 ENCOUNTER — TELEPHONE (OUTPATIENT)
Dept: MEDSURG UNIT | Facility: HOSPITAL | Age: 76
End: 2021-06-25

## 2021-06-29 ENCOUNTER — CLINICAL SUPPORT (OUTPATIENT)
Dept: UROLOGY | Facility: CLINIC | Age: 76
End: 2021-06-29
Payer: MEDICARE

## 2021-06-29 ENCOUNTER — CLINICAL SUPPORT (OUTPATIENT)
Dept: HEMATOLOGY/ONCOLOGY | Facility: CLINIC | Age: 76
End: 2021-06-29
Payer: MEDICARE

## 2021-06-29 ENCOUNTER — OFFICE VISIT (OUTPATIENT)
Dept: HEMATOLOGY/ONCOLOGY | Facility: CLINIC | Age: 76
End: 2021-06-29
Payer: MEDICARE

## 2021-06-29 VITALS
HEART RATE: 100 BPM | HEIGHT: 66 IN | SYSTOLIC BLOOD PRESSURE: 134 MMHG | TEMPERATURE: 96 F | BODY MASS INDEX: 34.19 KG/M2 | OXYGEN SATURATION: 98 % | RESPIRATION RATE: 18 BRPM | WEIGHT: 212.75 LBS | DIASTOLIC BLOOD PRESSURE: 61 MMHG

## 2021-06-29 DIAGNOSIS — N40.0 BENIGN PROSTATIC HYPERPLASIA WITHOUT LOWER URINARY TRACT SYMPTOMS: Primary | ICD-10-CM

## 2021-06-29 DIAGNOSIS — C18.1 CARCINOMA OF APPENDIX: ICD-10-CM

## 2021-06-29 LAB — POC RESIDUAL URINE VOLUME: 109 ML (ref 0–100)

## 2021-06-29 PROCEDURE — 99205 OFFICE O/P NEW HI 60 MIN: CPT | Mod: S$PBB,,, | Performed by: INTERNAL MEDICINE

## 2021-06-29 PROCEDURE — 99999 PR PBB SHADOW E&M-EST. PATIENT-LVL V: ICD-10-PCS | Mod: PBBFAC,,, | Performed by: INTERNAL MEDICINE

## 2021-06-29 PROCEDURE — 99999 PR PBB SHADOW E&M-EST. PATIENT-LVL V: CPT | Mod: PBBFAC,,, | Performed by: INTERNAL MEDICINE

## 2021-06-29 PROCEDURE — 51798 US URINE CAPACITY MEASURE: CPT | Mod: PBBFAC,PN

## 2021-06-29 PROCEDURE — 99215 OFFICE O/P EST HI 40 MIN: CPT | Mod: PBBFAC,PO | Performed by: INTERNAL MEDICINE

## 2021-06-29 PROCEDURE — 99499 NO LOS: ICD-10-PCS | Mod: S$PBB,,, | Performed by: UROLOGY

## 2021-06-29 PROCEDURE — 99205 PR OFFICE/OUTPT VISIT, NEW, LEVL V, 60-74 MIN: ICD-10-PCS | Mod: S$PBB,,, | Performed by: INTERNAL MEDICINE

## 2021-06-29 PROCEDURE — 99499 UNLISTED E&M SERVICE: CPT | Mod: S$PBB,,, | Performed by: UROLOGY

## 2021-06-30 VITALS
HEART RATE: 85 BPM | OXYGEN SATURATION: 95 % | TEMPERATURE: 99 F | HEIGHT: 66 IN | WEIGHT: 213 LBS | SYSTOLIC BLOOD PRESSURE: 141 MMHG | RESPIRATION RATE: 16 BRPM | BODY MASS INDEX: 34.23 KG/M2 | DIASTOLIC BLOOD PRESSURE: 70 MMHG

## 2021-07-08 ENCOUNTER — OFFICE VISIT (OUTPATIENT)
Dept: HEMATOLOGY/ONCOLOGY | Facility: CLINIC | Age: 76
End: 2021-07-08
Payer: MEDICARE

## 2021-07-08 ENCOUNTER — CLINICAL SUPPORT (OUTPATIENT)
Dept: HEMATOLOGY/ONCOLOGY | Facility: CLINIC | Age: 76
End: 2021-07-08
Payer: MEDICARE

## 2021-07-08 VITALS
TEMPERATURE: 98 F | DIASTOLIC BLOOD PRESSURE: 67 MMHG | SYSTOLIC BLOOD PRESSURE: 151 MMHG | RESPIRATION RATE: 18 BRPM | WEIGHT: 208.13 LBS | HEIGHT: 66 IN | OXYGEN SATURATION: 98 % | HEART RATE: 74 BPM | BODY MASS INDEX: 33.45 KG/M2

## 2021-07-08 DIAGNOSIS — C18.1 CARCINOMA OF APPENDIX: ICD-10-CM

## 2021-07-08 DIAGNOSIS — K35.32 ACUTE APPENDICITIS WITH PERFORATION, LOCALIZED PERITONITIS, AND GANGRENE, WITHOUT ABSCESS: Primary | ICD-10-CM

## 2021-07-08 PROCEDURE — 99215 PR OFFICE/OUTPT VISIT, EST, LEVL V, 40-54 MIN: ICD-10-PCS | Mod: S$PBB,,, | Performed by: INTERNAL MEDICINE

## 2021-07-08 PROCEDURE — 99215 OFFICE O/P EST HI 40 MIN: CPT | Mod: S$PBB,,, | Performed by: INTERNAL MEDICINE

## 2021-07-08 PROCEDURE — 99999 PR PBB SHADOW E&M-EST. PATIENT-LVL V: CPT | Mod: PBBFAC,,, | Performed by: INTERNAL MEDICINE

## 2021-07-08 PROCEDURE — 99215 OFFICE O/P EST HI 40 MIN: CPT | Mod: PBBFAC,PO | Performed by: INTERNAL MEDICINE

## 2021-07-08 PROCEDURE — 99999 PR PBB SHADOW E&M-EST. PATIENT-LVL V: ICD-10-PCS | Mod: PBBFAC,,, | Performed by: INTERNAL MEDICINE

## 2021-07-08 RX ORDER — CAPECITABINE 500 MG/1
2000 TABLET, FILM COATED ORAL 2 TIMES DAILY
Qty: 112 TABLET | Refills: 6 | Status: SHIPPED | OUTPATIENT
Start: 2021-07-08 | End: 2021-09-07 | Stop reason: SDUPTHER

## 2021-07-12 ENCOUNTER — SPECIALTY PHARMACY (OUTPATIENT)
Dept: PHARMACY | Facility: CLINIC | Age: 76
End: 2021-07-12

## 2021-07-13 ENCOUNTER — SPECIALTY PHARMACY (OUTPATIENT)
Dept: PHARMACY | Facility: CLINIC | Age: 76
End: 2021-07-13

## 2021-07-21 ENCOUNTER — PATIENT MESSAGE (OUTPATIENT)
Dept: PHARMACY | Facility: CLINIC | Age: 76
End: 2021-07-21

## 2021-07-21 ENCOUNTER — SPECIALTY PHARMACY (OUTPATIENT)
Dept: PHARMACY | Facility: CLINIC | Age: 76
End: 2021-07-21

## 2021-07-21 DIAGNOSIS — C18.1 CARCINOMA OF APPENDIX: Primary | ICD-10-CM

## 2021-07-22 ENCOUNTER — OFFICE VISIT (OUTPATIENT)
Dept: UROLOGY | Facility: CLINIC | Age: 76
End: 2021-07-22
Payer: MEDICARE

## 2021-07-22 VITALS — DIASTOLIC BLOOD PRESSURE: 90 MMHG | SYSTOLIC BLOOD PRESSURE: 157 MMHG | HEART RATE: 83 BPM

## 2021-07-22 DIAGNOSIS — N40.0 BENIGN PROSTATIC HYPERPLASIA WITHOUT LOWER URINARY TRACT SYMPTOMS: Primary | ICD-10-CM

## 2021-07-22 PROCEDURE — 99213 OFFICE O/P EST LOW 20 MIN: CPT | Mod: PBBFAC,PN | Performed by: UROLOGY

## 2021-07-22 PROCEDURE — 99024 PR POST-OP FOLLOW-UP VISIT: ICD-10-PCS | Mod: POP,,, | Performed by: UROLOGY

## 2021-07-22 PROCEDURE — 99024 POSTOP FOLLOW-UP VISIT: CPT | Mod: POP,,, | Performed by: UROLOGY

## 2021-07-22 PROCEDURE — 99999 PR PBB SHADOW E&M-EST. PATIENT-LVL III: ICD-10-PCS | Mod: PBBFAC,,, | Performed by: UROLOGY

## 2021-07-22 PROCEDURE — 99999 PR PBB SHADOW E&M-EST. PATIENT-LVL III: CPT | Mod: PBBFAC,,, | Performed by: UROLOGY

## 2021-07-29 ENCOUNTER — OFFICE VISIT (OUTPATIENT)
Dept: HEMATOLOGY/ONCOLOGY | Facility: CLINIC | Age: 76
End: 2021-07-29
Payer: MEDICARE

## 2021-07-29 VITALS
RESPIRATION RATE: 19 BRPM | OXYGEN SATURATION: 99 % | BODY MASS INDEX: 33.84 KG/M2 | TEMPERATURE: 98 F | HEIGHT: 66 IN | WEIGHT: 210.56 LBS | HEART RATE: 67 BPM | DIASTOLIC BLOOD PRESSURE: 77 MMHG | SYSTOLIC BLOOD PRESSURE: 134 MMHG

## 2021-07-29 DIAGNOSIS — Z01.818 EXAMINATION PRIOR TO CHEMOTHERAPY: ICD-10-CM

## 2021-07-29 DIAGNOSIS — K35.32 ACUTE APPENDICITIS WITH PERFORATION, LOCALIZED PERITONITIS, AND GANGRENE, WITHOUT ABSCESS: Primary | ICD-10-CM

## 2021-07-29 DIAGNOSIS — C18.1 PRIMARY APPENDICEAL ADENOCARCINOMA: ICD-10-CM

## 2021-07-29 PROCEDURE — 99999 PR PBB SHADOW E&M-EST. PATIENT-LVL IV: CPT | Mod: PBBFAC,,, | Performed by: INTERNAL MEDICINE

## 2021-07-29 PROCEDURE — 99215 PR OFFICE/OUTPT VISIT, EST, LEVL V, 40-54 MIN: ICD-10-PCS | Mod: S$PBB,,, | Performed by: INTERNAL MEDICINE

## 2021-07-29 PROCEDURE — 99215 OFFICE O/P EST HI 40 MIN: CPT | Mod: S$PBB,,, | Performed by: INTERNAL MEDICINE

## 2021-07-29 PROCEDURE — 99214 OFFICE O/P EST MOD 30 MIN: CPT | Mod: PBBFAC,PO | Performed by: INTERNAL MEDICINE

## 2021-07-29 PROCEDURE — 99999 PR PBB SHADOW E&M-EST. PATIENT-LVL IV: ICD-10-PCS | Mod: PBBFAC,,, | Performed by: INTERNAL MEDICINE

## 2021-08-10 ENCOUNTER — SPECIALTY PHARMACY (OUTPATIENT)
Dept: PHARMACY | Facility: CLINIC | Age: 76
End: 2021-08-10

## 2021-08-10 DIAGNOSIS — C18.1 CARCINOMA OF APPENDIX: Primary | ICD-10-CM

## 2021-08-16 ENCOUNTER — SPECIALTY PHARMACY (OUTPATIENT)
Dept: PHARMACY | Facility: CLINIC | Age: 76
End: 2021-08-16

## 2021-08-16 ENCOUNTER — LAB VISIT (OUTPATIENT)
Dept: LAB | Facility: HOSPITAL | Age: 76
End: 2021-08-16
Attending: INTERNAL MEDICINE
Payer: MEDICARE

## 2021-08-16 DIAGNOSIS — K35.32 ACUTE APPENDICITIS WITH PERFORATION, LOCALIZED PERITONITIS, AND GANGRENE, WITHOUT ABSCESS: ICD-10-CM

## 2021-08-16 LAB
ALBUMIN SERPL BCP-MCNC: 3.7 G/DL (ref 3.5–5.2)
ALP SERPL-CCNC: 54 U/L (ref 55–135)
ALT SERPL W/O P-5'-P-CCNC: 12 U/L (ref 10–44)
ANION GAP SERPL CALC-SCNC: 8 MMOL/L (ref 8–16)
AST SERPL-CCNC: 15 U/L (ref 10–40)
BASOPHILS # BLD AUTO: 0.07 K/UL (ref 0–0.2)
BASOPHILS NFR BLD: 1.2 % (ref 0–1.9)
BILIRUB SERPL-MCNC: 0.9 MG/DL (ref 0.1–1)
BUN SERPL-MCNC: 16 MG/DL (ref 8–23)
CALCIUM SERPL-MCNC: 9.1 MG/DL (ref 8.7–10.5)
CHLORIDE SERPL-SCNC: 106 MMOL/L (ref 95–110)
CO2 SERPL-SCNC: 26 MMOL/L (ref 23–29)
CREAT SERPL-MCNC: 0.8 MG/DL (ref 0.5–1.4)
DIFFERENTIAL METHOD: ABNORMAL
EOSINOPHIL # BLD AUTO: 0.1 K/UL (ref 0–0.5)
EOSINOPHIL NFR BLD: 0.8 % (ref 0–8)
ERYTHROCYTE [DISTWIDTH] IN BLOOD BY AUTOMATED COUNT: 15.9 % (ref 11.5–14.5)
EST. GFR  (AFRICAN AMERICAN): >60 ML/MIN/1.73 M^2
EST. GFR  (NON AFRICAN AMERICAN): >60 ML/MIN/1.73 M^2
GLUCOSE SERPL-MCNC: 105 MG/DL (ref 70–110)
HCT VFR BLD AUTO: 37.7 % (ref 40–54)
HGB BLD-MCNC: 12.1 G/DL (ref 14–18)
IMM GRANULOCYTES # BLD AUTO: 0.01 K/UL (ref 0–0.04)
IMM GRANULOCYTES NFR BLD AUTO: 0.2 % (ref 0–0.5)
LYMPHOCYTES # BLD AUTO: 2.2 K/UL (ref 1–4.8)
LYMPHOCYTES NFR BLD: 36.3 % (ref 18–48)
MAGNESIUM SERPL-MCNC: 2 MG/DL (ref 1.6–2.6)
MCH RBC QN AUTO: 28.8 PG (ref 27–31)
MCHC RBC AUTO-ENTMCNC: 32.1 G/DL (ref 32–36)
MCV RBC AUTO: 90 FL (ref 82–98)
MONOCYTES # BLD AUTO: 0.5 K/UL (ref 0.3–1)
MONOCYTES NFR BLD: 8.4 % (ref 4–15)
NEUTROPHILS # BLD AUTO: 3.2 K/UL (ref 1.8–7.7)
NEUTROPHILS NFR BLD: 53.1 % (ref 38–73)
NRBC BLD-RTO: 0 /100 WBC
PLATELET # BLD AUTO: 231 K/UL (ref 150–450)
PMV BLD AUTO: 9.7 FL (ref 9.2–12.9)
POTASSIUM SERPL-SCNC: 4 MMOL/L (ref 3.5–5.1)
PROT SERPL-MCNC: 6.5 G/DL (ref 6–8.4)
RBC # BLD AUTO: 4.2 M/UL (ref 4.6–6.2)
SODIUM SERPL-SCNC: 140 MMOL/L (ref 136–145)
WBC # BLD AUTO: 6.04 K/UL (ref 3.9–12.7)

## 2021-08-16 PROCEDURE — 36415 COLL VENOUS BLD VENIPUNCTURE: CPT | Performed by: INTERNAL MEDICINE

## 2021-08-16 PROCEDURE — 85025 COMPLETE CBC W/AUTO DIFF WBC: CPT | Performed by: INTERNAL MEDICINE

## 2021-08-16 PROCEDURE — 80053 COMPREHEN METABOLIC PANEL: CPT | Performed by: INTERNAL MEDICINE

## 2021-08-16 PROCEDURE — 83735 ASSAY OF MAGNESIUM: CPT | Performed by: INTERNAL MEDICINE

## 2021-08-17 ENCOUNTER — OFFICE VISIT (OUTPATIENT)
Dept: HEMATOLOGY/ONCOLOGY | Facility: CLINIC | Age: 76
End: 2021-08-17
Payer: MEDICARE

## 2021-08-17 VITALS
TEMPERATURE: 98 F | OXYGEN SATURATION: 99 % | WEIGHT: 210.56 LBS | SYSTOLIC BLOOD PRESSURE: 152 MMHG | BODY MASS INDEX: 33.05 KG/M2 | DIASTOLIC BLOOD PRESSURE: 80 MMHG | HEART RATE: 65 BPM | HEIGHT: 67 IN | RESPIRATION RATE: 20 BRPM

## 2021-08-17 DIAGNOSIS — K35.32 ACUTE APPENDICITIS WITH PERFORATION, LOCALIZED PERITONITIS, AND GANGRENE, WITHOUT ABSCESS: Primary | ICD-10-CM

## 2021-08-17 DIAGNOSIS — E80.4 GILBERT'S SYNDROME: ICD-10-CM

## 2021-08-17 PROCEDURE — 99214 OFFICE O/P EST MOD 30 MIN: CPT | Mod: PBBFAC,PO | Performed by: INTERNAL MEDICINE

## 2021-08-17 PROCEDURE — 99215 OFFICE O/P EST HI 40 MIN: CPT | Mod: S$PBB,,, | Performed by: INTERNAL MEDICINE

## 2021-08-17 PROCEDURE — 99999 PR PBB SHADOW E&M-EST. PATIENT-LVL IV: CPT | Mod: PBBFAC,,, | Performed by: INTERNAL MEDICINE

## 2021-08-17 PROCEDURE — 99999 PR PBB SHADOW E&M-EST. PATIENT-LVL IV: ICD-10-PCS | Mod: PBBFAC,,, | Performed by: INTERNAL MEDICINE

## 2021-08-17 PROCEDURE — 99215 PR OFFICE/OUTPT VISIT, EST, LEVL V, 40-54 MIN: ICD-10-PCS | Mod: S$PBB,,, | Performed by: INTERNAL MEDICINE

## 2021-09-03 ENCOUNTER — SPECIALTY PHARMACY (OUTPATIENT)
Dept: PHARMACY | Facility: CLINIC | Age: 76
End: 2021-09-03

## 2021-09-03 ENCOUNTER — TELEPHONE (OUTPATIENT)
Dept: PHARMACY | Facility: CLINIC | Age: 76
End: 2021-09-03

## 2021-09-03 ENCOUNTER — LAB VISIT (OUTPATIENT)
Dept: LAB | Facility: HOSPITAL | Age: 76
End: 2021-09-03
Attending: INTERNAL MEDICINE
Payer: MEDICARE

## 2021-09-03 DIAGNOSIS — E80.4 GILBERT'S SYNDROME: ICD-10-CM

## 2021-09-03 DIAGNOSIS — K35.32 ACUTE APPENDICITIS WITH PERFORATION, LOCALIZED PERITONITIS, AND GANGRENE, WITHOUT ABSCESS: ICD-10-CM

## 2021-09-03 DIAGNOSIS — C18.1 CARCINOMA OF APPENDIX: Primary | ICD-10-CM

## 2021-09-03 LAB
ALBUMIN SERPL BCP-MCNC: 4 G/DL (ref 3.5–5.2)
ALP SERPL-CCNC: 62 U/L (ref 55–135)
ALT SERPL W/O P-5'-P-CCNC: 13 U/L (ref 10–44)
ANION GAP SERPL CALC-SCNC: 10 MMOL/L (ref 8–16)
AST SERPL-CCNC: 18 U/L (ref 10–40)
BASOPHILS # BLD AUTO: 0.05 K/UL (ref 0–0.2)
BASOPHILS NFR BLD: 0.9 % (ref 0–1.9)
BILIRUB SERPL-MCNC: 2.1 MG/DL (ref 0.1–1)
BUN SERPL-MCNC: 21 MG/DL (ref 8–23)
CALCIUM SERPL-MCNC: 8.9 MG/DL (ref 8.7–10.5)
CHLORIDE SERPL-SCNC: 104 MMOL/L (ref 95–110)
CO2 SERPL-SCNC: 23 MMOL/L (ref 23–29)
CREAT SERPL-MCNC: 1 MG/DL (ref 0.5–1.4)
DIFFERENTIAL METHOD: ABNORMAL
EOSINOPHIL # BLD AUTO: 0 K/UL (ref 0–0.5)
EOSINOPHIL NFR BLD: 0.4 % (ref 0–8)
ERYTHROCYTE [DISTWIDTH] IN BLOOD BY AUTOMATED COUNT: 18 % (ref 11.5–14.5)
EST. GFR  (AFRICAN AMERICAN): >60 ML/MIN/1.73 M^2
EST. GFR  (NON AFRICAN AMERICAN): >60 ML/MIN/1.73 M^2
GLUCOSE SERPL-MCNC: 111 MG/DL (ref 70–110)
HCT VFR BLD AUTO: 36.9 % (ref 40–54)
HGB BLD-MCNC: 12.1 G/DL (ref 14–18)
IMM GRANULOCYTES # BLD AUTO: 0 K/UL (ref 0–0.04)
IMM GRANULOCYTES NFR BLD AUTO: 0 % (ref 0–0.5)
LYMPHOCYTES # BLD AUTO: 2.4 K/UL (ref 1–4.8)
LYMPHOCYTES NFR BLD: 42.3 % (ref 18–48)
MAGNESIUM SERPL-MCNC: 2.1 MG/DL (ref 1.6–2.6)
MCH RBC QN AUTO: 29.9 PG (ref 27–31)
MCHC RBC AUTO-ENTMCNC: 32.8 G/DL (ref 32–36)
MCV RBC AUTO: 91 FL (ref 82–98)
MONOCYTES # BLD AUTO: 0.7 K/UL (ref 0.3–1)
MONOCYTES NFR BLD: 11.8 % (ref 4–15)
NEUTROPHILS # BLD AUTO: 2.5 K/UL (ref 1.8–7.7)
NEUTROPHILS NFR BLD: 44.6 % (ref 38–73)
NRBC BLD-RTO: 0 /100 WBC
PLATELET # BLD AUTO: 178 K/UL (ref 150–450)
PMV BLD AUTO: 9.8 FL (ref 9.2–12.9)
POTASSIUM SERPL-SCNC: 4.4 MMOL/L (ref 3.5–5.1)
PROT SERPL-MCNC: 6.6 G/DL (ref 6–8.4)
RBC # BLD AUTO: 4.05 M/UL (ref 4.6–6.2)
SODIUM SERPL-SCNC: 137 MMOL/L (ref 136–145)
WBC # BLD AUTO: 5.6 K/UL (ref 3.9–12.7)

## 2021-09-03 PROCEDURE — 36415 COLL VENOUS BLD VENIPUNCTURE: CPT | Performed by: INTERNAL MEDICINE

## 2021-09-03 PROCEDURE — 83735 ASSAY OF MAGNESIUM: CPT | Performed by: INTERNAL MEDICINE

## 2021-09-03 PROCEDURE — 80053 COMPREHEN METABOLIC PANEL: CPT | Performed by: INTERNAL MEDICINE

## 2021-09-03 PROCEDURE — 85025 COMPLETE CBC W/AUTO DIFF WBC: CPT | Performed by: INTERNAL MEDICINE

## 2021-09-07 ENCOUNTER — OFFICE VISIT (OUTPATIENT)
Dept: HEMATOLOGY/ONCOLOGY | Facility: CLINIC | Age: 76
End: 2021-09-07
Payer: MEDICARE

## 2021-09-07 ENCOUNTER — TELEPHONE (OUTPATIENT)
Dept: HEMATOLOGY/ONCOLOGY | Facility: CLINIC | Age: 76
End: 2021-09-07

## 2021-09-07 VITALS
HEIGHT: 67 IN | WEIGHT: 211.88 LBS | TEMPERATURE: 95 F | HEART RATE: 64 BPM | SYSTOLIC BLOOD PRESSURE: 134 MMHG | OXYGEN SATURATION: 98 % | RESPIRATION RATE: 18 BRPM | DIASTOLIC BLOOD PRESSURE: 78 MMHG | BODY MASS INDEX: 33.26 KG/M2

## 2021-09-07 DIAGNOSIS — C18.1 CARCINOMA OF APPENDIX: ICD-10-CM

## 2021-09-07 DIAGNOSIS — C18.1 PRIMARY APPENDICEAL ADENOCARCINOMA: ICD-10-CM

## 2021-09-07 DIAGNOSIS — K35.32 ACUTE APPENDICITIS WITH PERFORATION, LOCALIZED PERITONITIS, AND GANGRENE, WITHOUT ABSCESS: ICD-10-CM

## 2021-09-07 DIAGNOSIS — E80.4 GILBERT'S SYNDROME: Primary | ICD-10-CM

## 2021-09-07 DIAGNOSIS — Z01.818 EXAMINATION PRIOR TO CHEMOTHERAPY: ICD-10-CM

## 2021-09-07 PROCEDURE — 99215 OFFICE O/P EST HI 40 MIN: CPT | Mod: S$PBB,,, | Performed by: INTERNAL MEDICINE

## 2021-09-07 PROCEDURE — 99214 OFFICE O/P EST MOD 30 MIN: CPT | Mod: PBBFAC,PO | Performed by: INTERNAL MEDICINE

## 2021-09-07 PROCEDURE — 99999 PR PBB SHADOW E&M-EST. PATIENT-LVL IV: CPT | Mod: PBBFAC,,, | Performed by: INTERNAL MEDICINE

## 2021-09-07 PROCEDURE — 99999 PR PBB SHADOW E&M-EST. PATIENT-LVL IV: ICD-10-PCS | Mod: PBBFAC,,, | Performed by: INTERNAL MEDICINE

## 2021-09-07 PROCEDURE — 99215 PR OFFICE/OUTPT VISIT, EST, LEVL V, 40-54 MIN: ICD-10-PCS | Mod: S$PBB,,, | Performed by: INTERNAL MEDICINE

## 2021-09-07 RX ORDER — CAPECITABINE 500 MG/1
2000 TABLET, FILM COATED ORAL 2 TIMES DAILY
Qty: 112 TABLET | Refills: 6 | Status: SHIPPED | OUTPATIENT
Start: 2021-09-07 | End: 2021-09-07 | Stop reason: SDUPTHER

## 2021-09-07 RX ORDER — CAPECITABINE 500 MG/1
2000 TABLET, FILM COATED ORAL 2 TIMES DAILY
Qty: 112 TABLET | Refills: 6 | Status: SHIPPED | OUTPATIENT
Start: 2021-09-07 | End: 2021-10-20 | Stop reason: SDUPTHER

## 2021-09-08 ENCOUNTER — SPECIALTY PHARMACY (OUTPATIENT)
Dept: PHARMACY | Facility: CLINIC | Age: 76
End: 2021-09-08

## 2021-09-08 ENCOUNTER — PATIENT MESSAGE (OUTPATIENT)
Dept: PHARMACY | Facility: CLINIC | Age: 76
End: 2021-09-08

## 2021-09-24 ENCOUNTER — LAB VISIT (OUTPATIENT)
Dept: LAB | Facility: HOSPITAL | Age: 76
End: 2021-09-24
Attending: INTERNAL MEDICINE
Payer: MEDICARE

## 2021-09-24 DIAGNOSIS — C18.1 PRIMARY APPENDICEAL ADENOCARCINOMA: ICD-10-CM

## 2021-09-24 DIAGNOSIS — C18.1 CARCINOMA OF APPENDIX: ICD-10-CM

## 2021-09-24 DIAGNOSIS — E80.4 GILBERT'S SYNDROME: ICD-10-CM

## 2021-09-24 DIAGNOSIS — Z01.818 EXAMINATION PRIOR TO CHEMOTHERAPY: ICD-10-CM

## 2021-09-24 LAB
ALBUMIN SERPL BCP-MCNC: 3.8 G/DL (ref 3.5–5.2)
ALP SERPL-CCNC: 63 U/L (ref 55–135)
ALT SERPL W/O P-5'-P-CCNC: 13 U/L (ref 10–44)
ANION GAP SERPL CALC-SCNC: 9 MMOL/L (ref 8–16)
AST SERPL-CCNC: 18 U/L (ref 10–40)
BASOPHILS # BLD AUTO: 0.05 K/UL (ref 0–0.2)
BASOPHILS NFR BLD: 0.9 % (ref 0–1.9)
BILIRUB SERPL-MCNC: 2.2 MG/DL (ref 0.1–1)
BUN SERPL-MCNC: 16 MG/DL (ref 8–23)
CALCIUM SERPL-MCNC: 8.9 MG/DL (ref 8.7–10.5)
CHLORIDE SERPL-SCNC: 107 MMOL/L (ref 95–110)
CO2 SERPL-SCNC: 24 MMOL/L (ref 23–29)
CREAT SERPL-MCNC: 0.8 MG/DL (ref 0.5–1.4)
DIFFERENTIAL METHOD: ABNORMAL
EOSINOPHIL # BLD AUTO: 0.1 K/UL (ref 0–0.5)
EOSINOPHIL NFR BLD: 1.1 % (ref 0–8)
ERYTHROCYTE [DISTWIDTH] IN BLOOD BY AUTOMATED COUNT: 19.5 % (ref 11.5–14.5)
EST. GFR  (AFRICAN AMERICAN): >60 ML/MIN/1.73 M^2
EST. GFR  (NON AFRICAN AMERICAN): >60 ML/MIN/1.73 M^2
GLUCOSE SERPL-MCNC: 106 MG/DL (ref 70–110)
HCT VFR BLD AUTO: 38.6 % (ref 40–54)
HGB BLD-MCNC: 13 G/DL (ref 14–18)
IMM GRANULOCYTES # BLD AUTO: 0.02 K/UL (ref 0–0.04)
IMM GRANULOCYTES NFR BLD AUTO: 0.4 % (ref 0–0.5)
LYMPHOCYTES # BLD AUTO: 1.9 K/UL (ref 1–4.8)
LYMPHOCYTES NFR BLD: 35 % (ref 18–48)
MAGNESIUM SERPL-MCNC: 2 MG/DL (ref 1.6–2.6)
MCH RBC QN AUTO: 30.9 PG (ref 27–31)
MCHC RBC AUTO-ENTMCNC: 33.7 G/DL (ref 32–36)
MCV RBC AUTO: 92 FL (ref 82–98)
MONOCYTES # BLD AUTO: 0.5 K/UL (ref 0.3–1)
MONOCYTES NFR BLD: 9.2 % (ref 4–15)
NEUTROPHILS # BLD AUTO: 3 K/UL (ref 1.8–7.7)
NEUTROPHILS NFR BLD: 53.4 % (ref 38–73)
NRBC BLD-RTO: 0 /100 WBC
PLATELET # BLD AUTO: 169 K/UL (ref 150–450)
PMV BLD AUTO: 10 FL (ref 9.2–12.9)
POTASSIUM SERPL-SCNC: 4 MMOL/L (ref 3.5–5.1)
PROT SERPL-MCNC: 6.8 G/DL (ref 6–8.4)
RBC # BLD AUTO: 4.21 M/UL (ref 4.6–6.2)
SODIUM SERPL-SCNC: 140 MMOL/L (ref 136–145)
WBC # BLD AUTO: 5.54 K/UL (ref 3.9–12.7)

## 2021-09-24 PROCEDURE — 83735 ASSAY OF MAGNESIUM: CPT | Performed by: INTERNAL MEDICINE

## 2021-09-24 PROCEDURE — 85025 COMPLETE CBC W/AUTO DIFF WBC: CPT | Performed by: INTERNAL MEDICINE

## 2021-09-24 PROCEDURE — 36415 COLL VENOUS BLD VENIPUNCTURE: CPT | Performed by: INTERNAL MEDICINE

## 2021-09-24 PROCEDURE — 80053 COMPREHEN METABOLIC PANEL: CPT | Performed by: INTERNAL MEDICINE

## 2021-09-27 ENCOUNTER — TELEPHONE (OUTPATIENT)
Dept: HEMATOLOGY/ONCOLOGY | Facility: CLINIC | Age: 76
End: 2021-09-27

## 2021-09-28 ENCOUNTER — OFFICE VISIT (OUTPATIENT)
Dept: HEMATOLOGY/ONCOLOGY | Facility: CLINIC | Age: 76
End: 2021-09-28
Payer: MEDICARE

## 2021-09-28 VITALS
BODY MASS INDEX: 33.8 KG/M2 | WEIGHT: 215.38 LBS | HEIGHT: 67 IN | DIASTOLIC BLOOD PRESSURE: 76 MMHG | SYSTOLIC BLOOD PRESSURE: 143 MMHG | TEMPERATURE: 97 F | HEART RATE: 66 BPM | OXYGEN SATURATION: 99 %

## 2021-09-28 DIAGNOSIS — C18.1 CARCINOMA OF APPENDIX: ICD-10-CM

## 2021-09-28 DIAGNOSIS — C18.1 PRIMARY APPENDICEAL ADENOCARCINOMA: ICD-10-CM

## 2021-09-28 DIAGNOSIS — Z01.818 EXAMINATION PRIOR TO CHEMOTHERAPY: ICD-10-CM

## 2021-09-28 DIAGNOSIS — E80.4 GILBERT'S SYNDROME: Primary | ICD-10-CM

## 2021-09-28 PROCEDURE — 99999 PR PBB SHADOW E&M-EST. PATIENT-LVL III: ICD-10-PCS | Mod: PBBFAC,,, | Performed by: INTERNAL MEDICINE

## 2021-09-28 PROCEDURE — 99215 OFFICE O/P EST HI 40 MIN: CPT | Mod: S$PBB,,, | Performed by: INTERNAL MEDICINE

## 2021-09-28 PROCEDURE — 99999 PR PBB SHADOW E&M-EST. PATIENT-LVL III: CPT | Mod: PBBFAC,,, | Performed by: INTERNAL MEDICINE

## 2021-09-28 PROCEDURE — 99215 PR OFFICE/OUTPT VISIT, EST, LEVL V, 40-54 MIN: ICD-10-PCS | Mod: S$PBB,,, | Performed by: INTERNAL MEDICINE

## 2021-09-28 PROCEDURE — 99213 OFFICE O/P EST LOW 20 MIN: CPT | Mod: PBBFAC,PO | Performed by: INTERNAL MEDICINE

## 2021-09-29 ENCOUNTER — SPECIALTY PHARMACY (OUTPATIENT)
Dept: PHARMACY | Facility: CLINIC | Age: 76
End: 2021-09-29

## 2021-09-29 DIAGNOSIS — C18.1 CARCINOMA OF APPENDIX: Primary | ICD-10-CM

## 2021-09-29 DIAGNOSIS — E80.4 GILBERT'S SYNDROME: Primary | ICD-10-CM

## 2021-09-29 DIAGNOSIS — C18.1 PRIMARY APPENDICEAL ADENOCARCINOMA: ICD-10-CM

## 2021-09-29 RX ORDER — CAPECITABINE 150 MG/1
150 TABLET, FILM COATED ORAL 2 TIMES DAILY
Qty: 28 TABLET | Refills: 0 | Status: SHIPPED | OUTPATIENT
Start: 2021-09-29 | End: 2021-10-20

## 2021-09-29 RX ORDER — CAPECITABINE 500 MG/1
2500 TABLET, FILM COATED ORAL 2 TIMES DAILY
Qty: 140 TABLET | Refills: 0 | Status: SHIPPED | OUTPATIENT
Start: 2021-09-29 | End: 2021-10-20

## 2021-10-09 ENCOUNTER — NURSE TRIAGE (OUTPATIENT)
Dept: ADMINISTRATIVE | Facility: CLINIC | Age: 76
End: 2021-10-09

## 2021-10-09 ENCOUNTER — HOSPITAL ENCOUNTER (OUTPATIENT)
Facility: HOSPITAL | Age: 76
Discharge: HOME | End: 2021-10-11
Attending: EMERGENCY MEDICINE | Admitting: INTERNAL MEDICINE
Payer: MEDICARE

## 2021-10-09 DIAGNOSIS — R07.9 CHEST PAIN: ICD-10-CM

## 2021-10-09 DIAGNOSIS — Z01.810 PREOP CARDIOVASCULAR EXAM: ICD-10-CM

## 2021-10-09 DIAGNOSIS — K81.0 ACUTE CHOLECYSTITIS: Primary | ICD-10-CM

## 2021-10-09 LAB
ALBUMIN SERPL BCP-MCNC: 4 G/DL (ref 3.5–5.2)
ALP SERPL-CCNC: 58 U/L (ref 55–135)
ALT SERPL W/O P-5'-P-CCNC: 13 U/L (ref 10–44)
ANION GAP SERPL CALC-SCNC: 13 MMOL/L (ref 8–16)
AST SERPL-CCNC: 15 U/L (ref 10–40)
BACTERIA #/AREA URNS HPF: ABNORMAL /HPF
BASOPHILS # BLD AUTO: 0.03 K/UL (ref 0–0.2)
BASOPHILS NFR BLD: 0.3 % (ref 0–1.9)
BILIRUB SERPL-MCNC: 3.6 MG/DL (ref 0.1–1)
BILIRUB UR QL STRIP: NEGATIVE
BUN SERPL-MCNC: 17 MG/DL (ref 8–23)
CALCIUM SERPL-MCNC: 9.1 MG/DL (ref 8.7–10.5)
CHLORIDE SERPL-SCNC: 101 MMOL/L (ref 95–110)
CHOLEST SERPL-MCNC: 116 MG/DL (ref 120–199)
CHOLEST/HDLC SERPL: 2.4 {RATIO} (ref 2–5)
CLARITY UR: CLEAR
CO2 SERPL-SCNC: 25 MMOL/L (ref 23–29)
COLOR UR: YELLOW
CREAT SERPL-MCNC: 0.9 MG/DL (ref 0.5–1.4)
DIFFERENTIAL METHOD: ABNORMAL
EOSINOPHIL # BLD AUTO: 0 K/UL (ref 0–0.5)
EOSINOPHIL NFR BLD: 0 % (ref 0–8)
ERYTHROCYTE [DISTWIDTH] IN BLOOD BY AUTOMATED COUNT: 20 % (ref 11.5–14.5)
EST. GFR  (AFRICAN AMERICAN): >60 ML/MIN/1.73 M^2
EST. GFR  (NON AFRICAN AMERICAN): >60 ML/MIN/1.73 M^2
GLUCOSE SERPL-MCNC: 143 MG/DL (ref 70–110)
GLUCOSE UR QL STRIP: NEGATIVE
HCT VFR BLD AUTO: 39.8 % (ref 40–54)
HDLC SERPL-MCNC: 48 MG/DL (ref 40–75)
HDLC SERPL: 41.4 % (ref 20–50)
HGB BLD-MCNC: 13.6 G/DL (ref 14–18)
HGB UR QL STRIP: ABNORMAL
IMM GRANULOCYTES # BLD AUTO: 0.03 K/UL (ref 0–0.04)
IMM GRANULOCYTES NFR BLD AUTO: 0.3 % (ref 0–0.5)
KETONES UR QL STRIP: NEGATIVE
LDLC SERPL CALC-MCNC: 50 MG/DL (ref 63–159)
LEUKOCYTE ESTERASE UR QL STRIP: ABNORMAL
LIPASE SERPL-CCNC: 54 U/L (ref 4–60)
LYMPHOCYTES # BLD AUTO: 2.2 K/UL (ref 1–4.8)
LYMPHOCYTES NFR BLD: 19.8 % (ref 18–48)
MCH RBC QN AUTO: 31.5 PG (ref 27–31)
MCHC RBC AUTO-ENTMCNC: 34.2 G/DL (ref 32–36)
MCV RBC AUTO: 92 FL (ref 82–98)
MICROSCOPIC COMMENT: ABNORMAL
MONOCYTES # BLD AUTO: 1 K/UL (ref 0.3–1)
MONOCYTES NFR BLD: 8.5 % (ref 4–15)
NEUTROPHILS # BLD AUTO: 8 K/UL (ref 1.8–7.7)
NEUTROPHILS NFR BLD: 71.1 % (ref 38–73)
NITRITE UR QL STRIP: NEGATIVE
NONHDLC SERPL-MCNC: 68 MG/DL
NRBC BLD-RTO: 0 /100 WBC
PH UR STRIP: 7 [PH] (ref 5–8)
PLATELET # BLD AUTO: 183 K/UL (ref 150–450)
PMV BLD AUTO: 10.4 FL (ref 9.2–12.9)
POTASSIUM SERPL-SCNC: 4.3 MMOL/L (ref 3.5–5.1)
PROT SERPL-MCNC: 6.8 G/DL (ref 6–8.4)
PROT UR QL STRIP: NEGATIVE
RBC # BLD AUTO: 4.32 M/UL (ref 4.6–6.2)
RBC #/AREA URNS HPF: 2 /HPF (ref 0–4)
SARS-COV-2 RDRP RESP QL NAA+PROBE: NEGATIVE
SODIUM SERPL-SCNC: 139 MMOL/L (ref 136–145)
SP GR UR STRIP: 1.01 (ref 1–1.03)
TRIGL SERPL-MCNC: 90 MG/DL (ref 30–150)
TROPONIN I SERPL DL<=0.01 NG/ML-MCNC: 0.03 NG/ML (ref 0–0.03)
URN SPEC COLLECT METH UR: ABNORMAL
UROBILINOGEN UR STRIP-ACNC: NEGATIVE EU/DL
WBC # BLD AUTO: 11.25 K/UL (ref 3.9–12.7)
WBC #/AREA URNS HPF: 8 /HPF (ref 0–5)

## 2021-10-09 PROCEDURE — 80061 LIPID PANEL: CPT | Performed by: EMERGENCY MEDICINE

## 2021-10-09 PROCEDURE — G0378 HOSPITAL OBSERVATION PER HR: HCPCS

## 2021-10-09 PROCEDURE — 25000003 PHARM REV CODE 250: Performed by: INTERNAL MEDICINE

## 2021-10-09 PROCEDURE — 83690 ASSAY OF LIPASE: CPT | Performed by: EMERGENCY MEDICINE

## 2021-10-09 PROCEDURE — 85025 COMPLETE CBC W/AUTO DIFF WBC: CPT | Performed by: EMERGENCY MEDICINE

## 2021-10-09 PROCEDURE — 96374 THER/PROPH/DIAG INJ IV PUSH: CPT | Mod: 59

## 2021-10-09 PROCEDURE — 36415 COLL VENOUS BLD VENIPUNCTURE: CPT | Performed by: EMERGENCY MEDICINE

## 2021-10-09 PROCEDURE — 63600175 PHARM REV CODE 636 W HCPCS: Performed by: INTERNAL MEDICINE

## 2021-10-09 PROCEDURE — U0002 COVID-19 LAB TEST NON-CDC: HCPCS | Performed by: EMERGENCY MEDICINE

## 2021-10-09 PROCEDURE — 96361 HYDRATE IV INFUSION ADD-ON: CPT

## 2021-10-09 PROCEDURE — 84484 ASSAY OF TROPONIN QUANT: CPT | Performed by: INTERNAL MEDICINE

## 2021-10-09 PROCEDURE — 96372 THER/PROPH/DIAG INJ SC/IM: CPT | Mod: 59

## 2021-10-09 PROCEDURE — 81000 URINALYSIS NONAUTO W/SCOPE: CPT | Performed by: EMERGENCY MEDICINE

## 2021-10-09 PROCEDURE — 36415 COLL VENOUS BLD VENIPUNCTURE: CPT | Performed by: INTERNAL MEDICINE

## 2021-10-09 PROCEDURE — 80053 COMPREHEN METABOLIC PANEL: CPT | Performed by: EMERGENCY MEDICINE

## 2021-10-09 PROCEDURE — 96376 TX/PRO/DX INJ SAME DRUG ADON: CPT

## 2021-10-09 PROCEDURE — 99285 EMERGENCY DEPT VISIT HI MDM: CPT | Mod: 25

## 2021-10-09 PROCEDURE — 25500020 PHARM REV CODE 255

## 2021-10-09 RX ORDER — IBUPROFEN 200 MG
24 TABLET ORAL
Status: DISCONTINUED | OUTPATIENT
Start: 2021-10-09 | End: 2021-10-11 | Stop reason: HOSPADM

## 2021-10-09 RX ORDER — SODIUM CHLORIDE 0.9 % (FLUSH) 0.9 %
10 SYRINGE (ML) INJECTION EVERY 12 HOURS PRN
Status: DISCONTINUED | OUTPATIENT
Start: 2021-10-09 | End: 2021-10-11 | Stop reason: HOSPADM

## 2021-10-09 RX ORDER — SODIUM CHLORIDE 9 MG/ML
INJECTION, SOLUTION INTRAVENOUS CONTINUOUS
Status: DISCONTINUED | OUTPATIENT
Start: 2021-10-09 | End: 2021-10-11 | Stop reason: HOSPADM

## 2021-10-09 RX ORDER — TAMSULOSIN HYDROCHLORIDE 0.4 MG/1
0.8 CAPSULE ORAL DAILY
Status: DISCONTINUED | OUTPATIENT
Start: 2021-10-10 | End: 2021-10-11 | Stop reason: HOSPADM

## 2021-10-09 RX ORDER — GLUCAGON 1 MG
1 KIT INJECTION
Status: DISCONTINUED | OUTPATIENT
Start: 2021-10-09 | End: 2021-10-11 | Stop reason: HOSPADM

## 2021-10-09 RX ORDER — IBUPROFEN 200 MG
16 TABLET ORAL
Status: DISCONTINUED | OUTPATIENT
Start: 2021-10-09 | End: 2021-10-11 | Stop reason: HOSPADM

## 2021-10-09 RX ORDER — LANOLIN ALCOHOL/MO/W.PET/CERES
800 CREAM (GRAM) TOPICAL
Status: DISCONTINUED | OUTPATIENT
Start: 2021-10-09 | End: 2021-10-11 | Stop reason: HOSPADM

## 2021-10-09 RX ORDER — FINASTERIDE 5 MG/1
5 TABLET, FILM COATED ORAL DAILY
Status: DISCONTINUED | OUTPATIENT
Start: 2021-10-10 | End: 2021-10-11 | Stop reason: HOSPADM

## 2021-10-09 RX ORDER — CARVEDILOL 6.25 MG/1
12.5 TABLET ORAL 2 TIMES DAILY
Status: DISCONTINUED | OUTPATIENT
Start: 2021-10-09 | End: 2021-10-11 | Stop reason: HOSPADM

## 2021-10-09 RX ORDER — SODIUM,POTASSIUM PHOSPHATES 280-250MG
2 POWDER IN PACKET (EA) ORAL
Status: DISCONTINUED | OUTPATIENT
Start: 2021-10-09 | End: 2021-10-11 | Stop reason: HOSPADM

## 2021-10-09 RX ORDER — AMLODIPINE BESYLATE 5 MG/1
10 TABLET ORAL NIGHTLY
Status: DISCONTINUED | OUTPATIENT
Start: 2021-10-09 | End: 2021-10-11 | Stop reason: HOSPADM

## 2021-10-09 RX ORDER — ALBUTEROL SULFATE 90 UG/1
2 AEROSOL, METERED RESPIRATORY (INHALATION) EVERY 6 HOURS PRN
Status: DISCONTINUED | OUTPATIENT
Start: 2021-10-09 | End: 2021-10-11 | Stop reason: HOSPADM

## 2021-10-09 RX ORDER — NALOXONE HCL 0.4 MG/ML
0.02 VIAL (ML) INJECTION
Status: DISCONTINUED | OUTPATIENT
Start: 2021-10-09 | End: 2021-10-11 | Stop reason: HOSPADM

## 2021-10-09 RX ORDER — HEPARIN SODIUM 5000 [USP'U]/ML
5000 INJECTION, SOLUTION INTRAVENOUS; SUBCUTANEOUS EVERY 8 HOURS
Status: DISCONTINUED | OUTPATIENT
Start: 2021-10-09 | End: 2021-10-11 | Stop reason: HOSPADM

## 2021-10-09 RX ORDER — HYDROCODONE BITARTRATE AND ACETAMINOPHEN 5; 325 MG/1; MG/1
1 TABLET ORAL EVERY 6 HOURS PRN
Status: DISCONTINUED | OUTPATIENT
Start: 2021-10-09 | End: 2021-10-11 | Stop reason: HOSPADM

## 2021-10-09 RX ADMIN — SODIUM CHLORIDE: 0.9 INJECTION, SOLUTION INTRAVENOUS at 05:10

## 2021-10-09 RX ADMIN — PIPERACILLIN SODIUM AND TAZOBACTAM SODIUM 3.38 G: 3; .375 INJECTION, POWDER, LYOPHILIZED, FOR SOLUTION INTRAVENOUS at 04:10

## 2021-10-09 RX ADMIN — HEPARIN SODIUM 5000 UNITS: 5000 INJECTION INTRAVENOUS; SUBCUTANEOUS at 09:10

## 2021-10-09 RX ADMIN — IOHEXOL 100 ML: 350 INJECTION, SOLUTION INTRAVENOUS at 11:10

## 2021-10-09 RX ADMIN — PIPERACILLIN SODIUM AND TAZOBACTAM SODIUM 3.38 G: 3; .375 INJECTION, POWDER, LYOPHILIZED, FOR SOLUTION INTRAVENOUS at 09:10

## 2021-10-09 RX ADMIN — AMLODIPINE BESYLATE 10 MG: 5 TABLET ORAL at 09:10

## 2021-10-09 RX ADMIN — CARVEDILOL 12.5 MG: 6.25 TABLET, FILM COATED ORAL at 09:10

## 2021-10-10 ENCOUNTER — ANESTHESIA EVENT (OUTPATIENT)
Dept: SURGERY | Facility: HOSPITAL | Age: 76
End: 2021-10-10
Payer: MEDICARE

## 2021-10-10 ENCOUNTER — ANESTHESIA (OUTPATIENT)
Dept: SURGERY | Facility: HOSPITAL | Age: 76
End: 2021-10-10
Payer: MEDICARE

## 2021-10-10 LAB
ALBUMIN SERPL BCP-MCNC: 3.4 G/DL (ref 3.5–5.2)
ALP SERPL-CCNC: 56 U/L (ref 55–135)
ALT SERPL W/O P-5'-P-CCNC: 11 U/L (ref 10–44)
ANION GAP SERPL CALC-SCNC: 9 MMOL/L (ref 8–16)
AORTIC ROOT ANNULUS: 4.15 CM
AORTIC VALVE CUSP SEPERATION: 2.63 CM
AST SERPL-CCNC: 13 U/L (ref 10–40)
AV INDEX (PROSTH): 1.05
AV MEAN GRADIENT: 4 MMHG
AV PEAK GRADIENT: 9 MMHG
AV VALVE AREA: 5.19 CM2
AV VELOCITY RATIO: 0.88
BASOPHILS # BLD AUTO: 0.04 K/UL (ref 0–0.2)
BASOPHILS NFR BLD: 0.5 % (ref 0–1.9)
BILIRUB DIRECT SERPL-MCNC: 0.3 MG/DL (ref 0.1–0.3)
BILIRUB SERPL-MCNC: 4.8 MG/DL (ref 0.1–1)
BSA FOR ECHO PROCEDURE: 2.14 M2
BUN SERPL-MCNC: 16 MG/DL (ref 8–23)
CALCIUM SERPL-MCNC: 8.2 MG/DL (ref 8.7–10.5)
CHLORIDE SERPL-SCNC: 105 MMOL/L (ref 95–110)
CO2 SERPL-SCNC: 25 MMOL/L (ref 23–29)
CREAT SERPL-MCNC: 0.9 MG/DL (ref 0.5–1.4)
CV ECHO LV RWT: 0.44 CM
DIFFERENTIAL METHOD: ABNORMAL
DOP CALC AO PEAK VEL: 1.48 M/S
DOP CALC AO VTI: 25.92 CM
DOP CALC LVOT AREA: 4.9 CM2
DOP CALC LVOT DIAMETER: 2.51 CM
DOP CALC LVOT PEAK VEL: 1.3 M/S
DOP CALC LVOT STROKE VOLUME: 134.57 CM3
DOP CALC MV VTI: 42.86 CM
DOP CALCLVOT PEAK VEL VTI: 27.21 CM
E WAVE DECELERATION TIME: 304.58 MSEC
E/A RATIO: 0.73
E/E' RATIO: 12.93 M/S
ECHO LV POSTERIOR WALL: 0.95 CM (ref 0.6–1.1)
EJECTION FRACTION: 55 %
EOSINOPHIL # BLD AUTO: 0 K/UL (ref 0–0.5)
EOSINOPHIL NFR BLD: 0.1 % (ref 0–8)
ERYTHROCYTE [DISTWIDTH] IN BLOOD BY AUTOMATED COUNT: 20.2 % (ref 11.5–14.5)
EST. GFR  (AFRICAN AMERICAN): >60 ML/MIN/1.73 M^2
EST. GFR  (NON AFRICAN AMERICAN): >60 ML/MIN/1.73 M^2
FRACTIONAL SHORTENING: 34 % (ref 28–44)
GLUCOSE SERPL-MCNC: 106 MG/DL (ref 70–110)
HCT VFR BLD AUTO: 35.9 % (ref 40–54)
HGB BLD-MCNC: 12.2 G/DL (ref 14–18)
IMM GRANULOCYTES # BLD AUTO: 0.02 K/UL (ref 0–0.04)
IMM GRANULOCYTES NFR BLD AUTO: 0.3 % (ref 0–0.5)
INTERVENTRICULAR SEPTUM: 0.96 CM (ref 0.6–1.1)
LEFT ATRIUM SIZE: 2.95 CM
LEFT INTERNAL DIMENSION IN SYSTOLE: 2.84 CM (ref 2.1–4)
LEFT VENTRICLE DIASTOLIC VOLUME INDEX: 40.19 ML/M2
LEFT VENTRICLE DIASTOLIC VOLUME: 83.19 ML
LEFT VENTRICLE MASS INDEX: 65 G/M2
LEFT VENTRICLE SYSTOLIC VOLUME INDEX: 14.8 ML/M2
LEFT VENTRICLE SYSTOLIC VOLUME: 30.69 ML
LEFT VENTRICULAR INTERNAL DIMENSION IN DIASTOLE: 4.3 CM (ref 3.5–6)
LEFT VENTRICULAR MASS: 133.7 G
LV LATERAL E/E' RATIO: 10.78 M/S
LV SEPTAL E/E' RATIO: 16.17 M/S
LYMPHOCYTES # BLD AUTO: 2.1 K/UL (ref 1–4.8)
LYMPHOCYTES NFR BLD: 27.3 % (ref 18–48)
MAGNESIUM SERPL-MCNC: 2 MG/DL (ref 1.6–2.6)
MCH RBC QN AUTO: 31.4 PG (ref 27–31)
MCHC RBC AUTO-ENTMCNC: 34 G/DL (ref 32–36)
MCV RBC AUTO: 93 FL (ref 82–98)
MONOCYTES # BLD AUTO: 0.8 K/UL (ref 0.3–1)
MONOCYTES NFR BLD: 11 % (ref 4–15)
MV MEAN GRADIENT: 1 MMHG
MV PEAK A VEL: 1.33 M/S
MV PEAK E VEL: 0.97 M/S
MV PEAK GRADIENT: 9 MMHG
MV STENOSIS PRESSURE HALF TIME: 96.51 MS
MV VALVE AREA BY CONTINUITY EQUATION: 3.14 CM2
MV VALVE AREA P 1/2 METHOD: 2.28 CM2
NEUTROPHILS # BLD AUTO: 4.6 K/UL (ref 1.8–7.7)
NEUTROPHILS NFR BLD: 60.8 % (ref 38–73)
NRBC BLD-RTO: 0 /100 WBC
PHOSPHATE SERPL-MCNC: 3 MG/DL (ref 2.7–4.5)
PISA MRMAX VEL: 0.06 M/S
PISA TR MAX VEL: 2.64 M/S
PLATELET # BLD AUTO: 141 K/UL (ref 150–450)
PMV BLD AUTO: 10.6 FL (ref 9.2–12.9)
POTASSIUM SERPL-SCNC: 3.6 MMOL/L (ref 3.5–5.1)
PROT SERPL-MCNC: 5.9 G/DL (ref 6–8.4)
PV PEAK VELOCITY: 0.9 CM/S
RA PRESSURE: 3 MMHG
RBC # BLD AUTO: 3.88 M/UL (ref 4.6–6.2)
RIGHT VENTRICULAR END-DIASTOLIC DIMENSION: 2.02 CM
RV TISSUE DOPPLER FREE WALL SYSTOLIC VELOCITY 1 (APICAL 4 CHAMBER VIEW): 13.27 CM/S
SODIUM SERPL-SCNC: 139 MMOL/L (ref 136–145)
TDI LATERAL: 0.09 M/S
TDI SEPTAL: 0.06 M/S
TDI: 0.08 M/S
TR MAX PG: 28 MMHG
TV REST PULMONARY ARTERY PRESSURE: 31 MMHG
WBC # BLD AUTO: 7.55 K/UL (ref 3.9–12.7)

## 2021-10-10 PROCEDURE — 99900035 HC TECH TIME PER 15 MIN (STAT)

## 2021-10-10 PROCEDURE — 99215 OFFICE O/P EST HI 40 MIN: CPT | Mod: ,,, | Performed by: INTERNAL MEDICINE

## 2021-10-10 PROCEDURE — 99215 PR OFFICE/OUTPT VISIT, EST, LEVL V, 40-54 MIN: ICD-10-PCS | Mod: ,,, | Performed by: INTERNAL MEDICINE

## 2021-10-10 PROCEDURE — 96372 THER/PROPH/DIAG INJ SC/IM: CPT | Mod: 59

## 2021-10-10 PROCEDURE — D9220A PRA ANESTHESIA: ICD-10-PCS | Mod: ANES,,, | Performed by: ANESTHESIOLOGY

## 2021-10-10 PROCEDURE — 99220 PR INITIAL OBSERVATION CARE,LEVL III: CPT | Mod: 25,,, | Performed by: INTERNAL MEDICINE

## 2021-10-10 PROCEDURE — 82248 BILIRUBIN DIRECT: CPT | Performed by: INTERNAL MEDICINE

## 2021-10-10 PROCEDURE — 63600175 PHARM REV CODE 636 W HCPCS: Performed by: INTERNAL MEDICINE

## 2021-10-10 PROCEDURE — 85025 COMPLETE CBC W/AUTO DIFF WBC: CPT | Performed by: INTERNAL MEDICINE

## 2021-10-10 PROCEDURE — 36000708 HC OR TIME LEV III 1ST 15 MIN: Performed by: SURGERY

## 2021-10-10 PROCEDURE — 71000033 HC RECOVERY, INTIAL HOUR: Performed by: SURGERY

## 2021-10-10 PROCEDURE — 83735 ASSAY OF MAGNESIUM: CPT | Performed by: INTERNAL MEDICINE

## 2021-10-10 PROCEDURE — G0378 HOSPITAL OBSERVATION PER HR: HCPCS

## 2021-10-10 PROCEDURE — 84100 ASSAY OF PHOSPHORUS: CPT | Performed by: INTERNAL MEDICINE

## 2021-10-10 PROCEDURE — 27201423 OPTIME MED/SURG SUP & DEVICES STERILE SUPPLY: Performed by: SURGERY

## 2021-10-10 PROCEDURE — 47562 LAPAROSCOPIC CHOLECYSTECTOMY: CPT | Mod: ,,, | Performed by: SURGERY

## 2021-10-10 PROCEDURE — 88304 PR  SURG PATH,LEVEL III: ICD-10-PCS | Mod: 26,,, | Performed by: PATHOLOGY

## 2021-10-10 PROCEDURE — 99214 OFFICE O/P EST MOD 30 MIN: CPT | Mod: 57,,, | Performed by: SURGERY

## 2021-10-10 PROCEDURE — 25000003 PHARM REV CODE 250: Performed by: NURSE ANESTHETIST, CERTIFIED REGISTERED

## 2021-10-10 PROCEDURE — 99214 PR OFFICE/OUTPT VISIT, EST, LEVL IV, 30-39 MIN: ICD-10-PCS | Mod: 57,,, | Performed by: SURGERY

## 2021-10-10 PROCEDURE — 37000008 HC ANESTHESIA 1ST 15 MINUTES: Performed by: SURGERY

## 2021-10-10 PROCEDURE — 88304 TISSUE EXAM BY PATHOLOGIST: CPT | Performed by: PATHOLOGY

## 2021-10-10 PROCEDURE — 47562 PR LAP,CHOLECYSTECTOMY: ICD-10-PCS | Mod: ,,, | Performed by: SURGERY

## 2021-10-10 PROCEDURE — 80053 COMPREHEN METABOLIC PANEL: CPT | Performed by: INTERNAL MEDICINE

## 2021-10-10 PROCEDURE — 96361 HYDRATE IV INFUSION ADD-ON: CPT | Mod: 59

## 2021-10-10 PROCEDURE — 25000003 PHARM REV CODE 250: Performed by: INTERNAL MEDICINE

## 2021-10-10 PROCEDURE — 36000709 HC OR TIME LEV III EA ADD 15 MIN: Performed by: SURGERY

## 2021-10-10 PROCEDURE — 63600175 PHARM REV CODE 636 W HCPCS: Performed by: NURSE ANESTHETIST, CERTIFIED REGISTERED

## 2021-10-10 PROCEDURE — D9220A PRA ANESTHESIA: ICD-10-PCS | Mod: CRNA,,, | Performed by: NURSE ANESTHETIST, CERTIFIED REGISTERED

## 2021-10-10 PROCEDURE — 37000009 HC ANESTHESIA EA ADD 15 MINS: Performed by: SURGERY

## 2021-10-10 PROCEDURE — 63600175 PHARM REV CODE 636 W HCPCS: Performed by: SURGERY

## 2021-10-10 PROCEDURE — 25000003 PHARM REV CODE 250: Performed by: SURGERY

## 2021-10-10 PROCEDURE — D9220A PRA ANESTHESIA: Mod: CRNA,,, | Performed by: NURSE ANESTHETIST, CERTIFIED REGISTERED

## 2021-10-10 PROCEDURE — 36415 COLL VENOUS BLD VENIPUNCTURE: CPT | Performed by: INTERNAL MEDICINE

## 2021-10-10 PROCEDURE — 94761 N-INVAS EAR/PLS OXIMETRY MLT: CPT

## 2021-10-10 PROCEDURE — 88304 TISSUE EXAM BY PATHOLOGIST: CPT | Mod: 26,,, | Performed by: PATHOLOGY

## 2021-10-10 PROCEDURE — 99220 PR INITIAL OBSERVATION CARE,LEVL III: ICD-10-PCS | Mod: 25,,, | Performed by: INTERNAL MEDICINE

## 2021-10-10 PROCEDURE — D9220A PRA ANESTHESIA: Mod: ANES,,, | Performed by: ANESTHESIOLOGY

## 2021-10-10 PROCEDURE — 96376 TX/PRO/DX INJ SAME DRUG ADON: CPT

## 2021-10-10 RX ORDER — HYDROMORPHONE HYDROCHLORIDE 1 MG/ML
1 INJECTION, SOLUTION INTRAMUSCULAR; INTRAVENOUS; SUBCUTANEOUS EVERY 6 HOURS PRN
Status: DISCONTINUED | OUTPATIENT
Start: 2021-10-10 | End: 2021-10-11 | Stop reason: HOSPADM

## 2021-10-10 RX ORDER — ROCURONIUM BROMIDE 10 MG/ML
INJECTION, SOLUTION INTRAVENOUS
Status: DISCONTINUED | OUTPATIENT
Start: 2021-10-10 | End: 2021-10-10

## 2021-10-10 RX ORDER — ACETAMINOPHEN 10 MG/ML
INJECTION, SOLUTION INTRAVENOUS
Status: DISCONTINUED | OUTPATIENT
Start: 2021-10-10 | End: 2021-10-10

## 2021-10-10 RX ORDER — ONDANSETRON HYDROCHLORIDE 2 MG/ML
INJECTION, SOLUTION INTRAMUSCULAR; INTRAVENOUS
Status: DISCONTINUED | OUTPATIENT
Start: 2021-10-10 | End: 2021-10-10

## 2021-10-10 RX ORDER — FENTANYL CITRATE 50 UG/ML
INJECTION, SOLUTION INTRAMUSCULAR; INTRAVENOUS
Status: DISCONTINUED | OUTPATIENT
Start: 2021-10-10 | End: 2021-10-10

## 2021-10-10 RX ORDER — DEXAMETHASONE SODIUM PHOSPHATE 4 MG/ML
INJECTION, SOLUTION INTRA-ARTICULAR; INTRALESIONAL; INTRAMUSCULAR; INTRAVENOUS; SOFT TISSUE
Status: DISCONTINUED | OUTPATIENT
Start: 2021-10-10 | End: 2021-10-10

## 2021-10-10 RX ORDER — ONDANSETRON 2 MG/ML
4 INJECTION INTRAMUSCULAR; INTRAVENOUS ONCE AS NEEDED
Status: DISCONTINUED | OUTPATIENT
Start: 2021-10-10 | End: 2021-10-10

## 2021-10-10 RX ORDER — FENTANYL CITRATE 50 UG/ML
25 INJECTION, SOLUTION INTRAMUSCULAR; INTRAVENOUS EVERY 5 MIN PRN
Status: DISCONTINUED | OUTPATIENT
Start: 2021-10-10 | End: 2021-10-10

## 2021-10-10 RX ORDER — LIDOCAINE HCL/PF 100 MG/5ML
SYRINGE (ML) INTRAVENOUS
Status: DISCONTINUED | OUTPATIENT
Start: 2021-10-10 | End: 2021-10-10

## 2021-10-10 RX ORDER — OXYCODONE HYDROCHLORIDE 5 MG/1
5 TABLET ORAL ONCE AS NEEDED
Status: DISCONTINUED | OUTPATIENT
Start: 2021-10-10 | End: 2021-10-10

## 2021-10-10 RX ORDER — BUPIVACAINE HYDROCHLORIDE AND EPINEPHRINE 2.5; 5 MG/ML; UG/ML
INJECTION, SOLUTION EPIDURAL; INFILTRATION; INTRACAUDAL; PERINEURAL
Status: DISCONTINUED | OUTPATIENT
Start: 2021-10-10 | End: 2021-10-10 | Stop reason: HOSPADM

## 2021-10-10 RX ORDER — PROPOFOL 10 MG/ML
VIAL (ML) INTRAVENOUS
Status: DISCONTINUED | OUTPATIENT
Start: 2021-10-10 | End: 2021-10-10

## 2021-10-10 RX ADMIN — PIPERACILLIN SODIUM AND TAZOBACTAM SODIUM 3.38 G: 3; .375 INJECTION, POWDER, LYOPHILIZED, FOR SOLUTION INTRAVENOUS at 10:10

## 2021-10-10 RX ADMIN — LIDOCAINE HYDROCHLORIDE 50 MG: 20 INJECTION INTRAVENOUS at 04:10

## 2021-10-10 RX ADMIN — FENTANYL CITRATE 50 MCG: 50 INJECTION, SOLUTION INTRAMUSCULAR; INTRAVENOUS at 05:10

## 2021-10-10 RX ADMIN — FINASTERIDE 5 MG: 5 TABLET, FILM COATED ORAL at 07:10

## 2021-10-10 RX ADMIN — SODIUM CHLORIDE: 0.9 INJECTION, SOLUTION INTRAVENOUS at 08:10

## 2021-10-10 RX ADMIN — SUGAMMADEX 200 MG: 100 INJECTION, SOLUTION INTRAVENOUS at 05:10

## 2021-10-10 RX ADMIN — PROPOFOL 100 MG: 10 INJECTION, EMULSION INTRAVENOUS at 04:10

## 2021-10-10 RX ADMIN — HEPARIN SODIUM 5000 UNITS: 5000 INJECTION INTRAVENOUS; SUBCUTANEOUS at 10:10

## 2021-10-10 RX ADMIN — PIPERACILLIN SODIUM AND TAZOBACTAM SODIUM 3.38 G: 3; .375 INJECTION, POWDER, LYOPHILIZED, FOR SOLUTION INTRAVENOUS at 04:10

## 2021-10-10 RX ADMIN — ONDANSETRON 4 MG: 2 INJECTION, SOLUTION INTRAMUSCULAR; INTRAVENOUS at 04:10

## 2021-10-10 RX ADMIN — CARVEDILOL 12.5 MG: 6.25 TABLET, FILM COATED ORAL at 08:10

## 2021-10-10 RX ADMIN — GLYCOPYRROLATE 0.2 MG: 0.2 INJECTION, SOLUTION INTRAMUSCULAR; INTRAVITREAL at 04:10

## 2021-10-10 RX ADMIN — FENTANYL CITRATE 50 MCG: 50 INJECTION, SOLUTION INTRAMUSCULAR; INTRAVENOUS at 04:10

## 2021-10-10 RX ADMIN — AMLODIPINE BESYLATE 10 MG: 5 TABLET ORAL at 08:10

## 2021-10-10 RX ADMIN — DEXAMETHASONE SODIUM PHOSPHATE 4 MG: 4 INJECTION, SOLUTION INTRA-ARTICULAR; INTRALESIONAL; INTRAMUSCULAR; INTRAVENOUS; SOFT TISSUE at 04:10

## 2021-10-10 RX ADMIN — ACETAMINOPHEN 1000 MG: 10 INJECTION, SOLUTION INTRAVENOUS at 04:10

## 2021-10-10 RX ADMIN — ROCURONIUM BROMIDE 35 MG: 10 INJECTION, SOLUTION INTRAVENOUS at 04:10

## 2021-10-10 RX ADMIN — CARVEDILOL 12.5 MG: 6.25 TABLET, FILM COATED ORAL at 07:10

## 2021-10-11 ENCOUNTER — TELEPHONE (OUTPATIENT)
Dept: HEMATOLOGY/ONCOLOGY | Facility: CLINIC | Age: 76
End: 2021-10-11

## 2021-10-11 ENCOUNTER — DOCUMENTATION ONLY (OUTPATIENT)
Dept: HEMATOLOGY/ONCOLOGY | Facility: CLINIC | Age: 76
End: 2021-10-11

## 2021-10-11 VITALS
HEIGHT: 67 IN | RESPIRATION RATE: 18 BRPM | TEMPERATURE: 99 F | DIASTOLIC BLOOD PRESSURE: 63 MMHG | WEIGHT: 215 LBS | BODY MASS INDEX: 33.74 KG/M2 | SYSTOLIC BLOOD PRESSURE: 133 MMHG | OXYGEN SATURATION: 97 % | HEART RATE: 69 BPM

## 2021-10-11 LAB
ALBUMIN SERPL BCP-MCNC: 3.4 G/DL (ref 3.5–5.2)
ALP SERPL-CCNC: 57 U/L (ref 55–135)
ALT SERPL W/O P-5'-P-CCNC: 15 U/L (ref 10–44)
ANION GAP SERPL CALC-SCNC: 11 MMOL/L (ref 8–16)
AST SERPL-CCNC: 17 U/L (ref 10–40)
BASOPHILS # BLD AUTO: 0.03 K/UL (ref 0–0.2)
BASOPHILS NFR BLD: 0.5 % (ref 0–1.9)
BILIRUB SERPL-MCNC: 4.5 MG/DL (ref 0.1–1)
BUN SERPL-MCNC: 13 MG/DL (ref 8–23)
CALCIUM SERPL-MCNC: 8.5 MG/DL (ref 8.7–10.5)
CHLORIDE SERPL-SCNC: 104 MMOL/L (ref 95–110)
CO2 SERPL-SCNC: 24 MMOL/L (ref 23–29)
CREAT SERPL-MCNC: 0.9 MG/DL (ref 0.5–1.4)
DIFFERENTIAL METHOD: ABNORMAL
EOSINOPHIL # BLD AUTO: 0 K/UL (ref 0–0.5)
EOSINOPHIL NFR BLD: 0 % (ref 0–8)
ERYTHROCYTE [DISTWIDTH] IN BLOOD BY AUTOMATED COUNT: 19.9 % (ref 11.5–14.5)
EST. GFR  (AFRICAN AMERICAN): >60 ML/MIN/1.73 M^2
EST. GFR  (NON AFRICAN AMERICAN): >60 ML/MIN/1.73 M^2
GLUCOSE SERPL-MCNC: 140 MG/DL (ref 70–110)
HCT VFR BLD AUTO: 34.3 % (ref 40–54)
HGB BLD-MCNC: 11.6 G/DL (ref 14–18)
IMM GRANULOCYTES # BLD AUTO: 0.02 K/UL (ref 0–0.04)
IMM GRANULOCYTES NFR BLD AUTO: 0.3 % (ref 0–0.5)
LYMPHOCYTES # BLD AUTO: 0.9 K/UL (ref 1–4.8)
LYMPHOCYTES NFR BLD: 14.1 % (ref 18–48)
MAGNESIUM SERPL-MCNC: 2 MG/DL (ref 1.6–2.6)
MCH RBC QN AUTO: 31.5 PG (ref 27–31)
MCHC RBC AUTO-ENTMCNC: 33.8 G/DL (ref 32–36)
MCV RBC AUTO: 93 FL (ref 82–98)
MONOCYTES # BLD AUTO: 0.4 K/UL (ref 0.3–1)
MONOCYTES NFR BLD: 6.1 % (ref 4–15)
NEUTROPHILS # BLD AUTO: 5 K/UL (ref 1.8–7.7)
NEUTROPHILS NFR BLD: 79 % (ref 38–73)
NRBC BLD-RTO: 0 /100 WBC
PHOSPHATE SERPL-MCNC: 3.5 MG/DL (ref 2.7–4.5)
PLATELET # BLD AUTO: 143 K/UL (ref 150–450)
PMV BLD AUTO: 10.5 FL (ref 9.2–12.9)
POTASSIUM SERPL-SCNC: 3.8 MMOL/L (ref 3.5–5.1)
PROT SERPL-MCNC: 5.9 G/DL (ref 6–8.4)
RBC # BLD AUTO: 3.68 M/UL (ref 4.6–6.2)
SODIUM SERPL-SCNC: 139 MMOL/L (ref 136–145)
WBC # BLD AUTO: 6.38 K/UL (ref 3.9–12.7)

## 2021-10-11 PROCEDURE — 25000003 PHARM REV CODE 250: Performed by: SURGERY

## 2021-10-11 PROCEDURE — 99900035 HC TECH TIME PER 15 MIN (STAT)

## 2021-10-11 PROCEDURE — 94760 N-INVAS EAR/PLS OXIMETRY 1: CPT

## 2021-10-11 PROCEDURE — 85025 COMPLETE CBC W/AUTO DIFF WBC: CPT | Performed by: SURGERY

## 2021-10-11 PROCEDURE — 84100 ASSAY OF PHOSPHORUS: CPT | Performed by: SURGERY

## 2021-10-11 PROCEDURE — 36415 COLL VENOUS BLD VENIPUNCTURE: CPT | Performed by: SURGERY

## 2021-10-11 PROCEDURE — 96376 TX/PRO/DX INJ SAME DRUG ADON: CPT

## 2021-10-11 PROCEDURE — 94761 N-INVAS EAR/PLS OXIMETRY MLT: CPT

## 2021-10-11 PROCEDURE — 96372 THER/PROPH/DIAG INJ SC/IM: CPT | Mod: 59

## 2021-10-11 PROCEDURE — 80053 COMPREHEN METABOLIC PANEL: CPT | Performed by: SURGERY

## 2021-10-11 PROCEDURE — G0378 HOSPITAL OBSERVATION PER HR: HCPCS

## 2021-10-11 PROCEDURE — 96361 HYDRATE IV INFUSION ADD-ON: CPT

## 2021-10-11 PROCEDURE — 83735 ASSAY OF MAGNESIUM: CPT | Performed by: SURGERY

## 2021-10-11 PROCEDURE — 63600175 PHARM REV CODE 636 W HCPCS: Performed by: SURGERY

## 2021-10-11 RX ORDER — HYDROCODONE BITARTRATE AND ACETAMINOPHEN 5; 325 MG/1; MG/1
1 TABLET ORAL EVERY 6 HOURS PRN
Qty: 15 TABLET | Refills: 0 | Status: ON HOLD | OUTPATIENT
Start: 2021-10-11 | End: 2023-12-27 | Stop reason: HOSPADM

## 2021-10-11 RX ADMIN — CARVEDILOL 12.5 MG: 6.25 TABLET, FILM COATED ORAL at 08:10

## 2021-10-11 RX ADMIN — SODIUM CHLORIDE: 0.9 INJECTION, SOLUTION INTRAVENOUS at 04:10

## 2021-10-11 RX ADMIN — PIPERACILLIN SODIUM AND TAZOBACTAM SODIUM 3.38 G: 3; .375 INJECTION, POWDER, LYOPHILIZED, FOR SOLUTION INTRAVENOUS at 10:10

## 2021-10-11 RX ADMIN — FINASTERIDE 5 MG: 5 TABLET, FILM COATED ORAL at 08:10

## 2021-10-11 RX ADMIN — PIPERACILLIN SODIUM AND TAZOBACTAM SODIUM 3.38 G: 3; .375 INJECTION, POWDER, LYOPHILIZED, FOR SOLUTION INTRAVENOUS at 04:10

## 2021-10-11 RX ADMIN — HEPARIN SODIUM 5000 UNITS: 5000 INJECTION INTRAVENOUS; SUBCUTANEOUS at 06:10

## 2021-10-12 ENCOUNTER — OFFICE VISIT (OUTPATIENT)
Dept: HEMATOLOGY/ONCOLOGY | Facility: CLINIC | Age: 76
End: 2021-10-12
Payer: MEDICARE

## 2021-10-12 ENCOUNTER — TELEPHONE (OUTPATIENT)
Dept: HEMATOLOGY/ONCOLOGY | Facility: CLINIC | Age: 76
End: 2021-10-12

## 2021-10-12 VITALS
RESPIRATION RATE: 18 BRPM | DIASTOLIC BLOOD PRESSURE: 66 MMHG | TEMPERATURE: 98 F | OXYGEN SATURATION: 98 % | HEIGHT: 66 IN | SYSTOLIC BLOOD PRESSURE: 137 MMHG | WEIGHT: 218.25 LBS | BODY MASS INDEX: 35.08 KG/M2 | HEART RATE: 62 BPM

## 2021-10-12 DIAGNOSIS — E80.4 GILBERT'S SYNDROME: Primary | ICD-10-CM

## 2021-10-12 DIAGNOSIS — K81.0 ACUTE CHOLECYSTITIS: ICD-10-CM

## 2021-10-12 DIAGNOSIS — R74.01 ELEVATED TRANSAMINASE LEVEL: ICD-10-CM

## 2021-10-12 DIAGNOSIS — Z01.818 EXAMINATION PRIOR TO CHEMOTHERAPY: ICD-10-CM

## 2021-10-12 DIAGNOSIS — C18.1 PRIMARY APPENDICEAL ADENOCARCINOMA: ICD-10-CM

## 2021-10-12 PROCEDURE — 99214 OFFICE O/P EST MOD 30 MIN: CPT | Mod: PBBFAC,PO | Performed by: INTERNAL MEDICINE

## 2021-10-12 PROCEDURE — 99999 PR PBB SHADOW E&M-EST. PATIENT-LVL IV: ICD-10-PCS | Mod: PBBFAC,,, | Performed by: INTERNAL MEDICINE

## 2021-10-12 PROCEDURE — 99999 PR PBB SHADOW E&M-EST. PATIENT-LVL IV: CPT | Mod: PBBFAC,,, | Performed by: INTERNAL MEDICINE

## 2021-10-12 PROCEDURE — 99215 PR OFFICE/OUTPT VISIT, EST, LEVL V, 40-54 MIN: ICD-10-PCS | Mod: S$PBB,,, | Performed by: INTERNAL MEDICINE

## 2021-10-12 PROCEDURE — 99215 OFFICE O/P EST HI 40 MIN: CPT | Mod: S$PBB,,, | Performed by: INTERNAL MEDICINE

## 2021-10-13 ENCOUNTER — TELEPHONE (OUTPATIENT)
Dept: MEDSURG UNIT | Facility: HOSPITAL | Age: 76
End: 2021-10-13

## 2021-10-13 LAB
FINAL PATHOLOGIC DIAGNOSIS: NORMAL
GROSS: NORMAL
Lab: NORMAL

## 2021-10-20 ENCOUNTER — SPECIALTY PHARMACY (OUTPATIENT)
Dept: PHARMACY | Facility: CLINIC | Age: 76
End: 2021-10-20
Payer: MEDICARE

## 2021-10-20 DIAGNOSIS — K35.32 ACUTE APPENDICITIS WITH PERFORATION, LOCALIZED PERITONITIS, AND GANGRENE, WITHOUT ABSCESS: ICD-10-CM

## 2021-10-20 DIAGNOSIS — C18.1 CARCINOMA OF APPENDIX: ICD-10-CM

## 2021-10-20 DIAGNOSIS — C18.1 CARCINOMA OF APPENDIX: Primary | ICD-10-CM

## 2021-10-21 RX ORDER — CAPECITABINE 500 MG/1
2000 TABLET, FILM COATED ORAL 2 TIMES DAILY
Qty: 112 TABLET | Refills: 6 | Status: SHIPPED | OUTPATIENT
Start: 2021-10-21 | End: 2022-01-19 | Stop reason: SDUPTHER

## 2021-10-26 ENCOUNTER — OFFICE VISIT (OUTPATIENT)
Dept: SURGERY | Facility: CLINIC | Age: 76
End: 2021-10-26
Payer: MEDICARE

## 2021-10-26 VITALS — BODY MASS INDEX: 34.55 KG/M2 | TEMPERATURE: 98 F | WEIGHT: 214.06 LBS

## 2021-10-26 DIAGNOSIS — Z98.890 POST-OPERATIVE STATE: Primary | ICD-10-CM

## 2021-10-26 PROCEDURE — 99024 PR POST-OP FOLLOW-UP VISIT: ICD-10-PCS | Mod: POP,,, | Performed by: SURGERY

## 2021-10-26 PROCEDURE — 99999 PR PBB SHADOW E&M-EST. PATIENT-LVL III: CPT | Mod: PBBFAC,,, | Performed by: SURGERY

## 2021-10-26 PROCEDURE — 99999 PR PBB SHADOW E&M-EST. PATIENT-LVL III: ICD-10-PCS | Mod: PBBFAC,,, | Performed by: SURGERY

## 2021-10-26 PROCEDURE — 99213 OFFICE O/P EST LOW 20 MIN: CPT | Mod: PBBFAC,PN | Performed by: SURGERY

## 2021-10-26 PROCEDURE — 99024 POSTOP FOLLOW-UP VISIT: CPT | Mod: POP,,, | Performed by: SURGERY

## 2021-10-29 ENCOUNTER — LAB VISIT (OUTPATIENT)
Dept: LAB | Facility: HOSPITAL | Age: 76
End: 2021-10-29
Attending: INTERNAL MEDICINE
Payer: MEDICARE

## 2021-10-29 DIAGNOSIS — K81.0 ACUTE CHOLECYSTITIS: ICD-10-CM

## 2021-10-29 DIAGNOSIS — E80.4 GILBERT'S SYNDROME: ICD-10-CM

## 2021-10-29 DIAGNOSIS — C18.1 PRIMARY APPENDICEAL ADENOCARCINOMA: ICD-10-CM

## 2021-10-29 LAB
ALBUMIN SERPL BCP-MCNC: 3.9 G/DL (ref 3.5–5.2)
ALP SERPL-CCNC: 66 U/L (ref 55–135)
ALT SERPL W/O P-5'-P-CCNC: 21 U/L (ref 10–44)
ANION GAP SERPL CALC-SCNC: 9 MMOL/L (ref 8–16)
AST SERPL-CCNC: 18 U/L (ref 10–40)
BASOPHILS # BLD AUTO: 0.08 K/UL (ref 0–0.2)
BASOPHILS NFR BLD: 1.1 % (ref 0–1.9)
BILIRUB SERPL-MCNC: 2.1 MG/DL (ref 0.1–1)
BUN SERPL-MCNC: 18 MG/DL (ref 8–23)
CALCIUM SERPL-MCNC: 9.2 MG/DL (ref 8.7–10.5)
CHLORIDE SERPL-SCNC: 107 MMOL/L (ref 95–110)
CO2 SERPL-SCNC: 24 MMOL/L (ref 23–29)
CREAT SERPL-MCNC: 0.9 MG/DL (ref 0.5–1.4)
DIFFERENTIAL METHOD: ABNORMAL
EOSINOPHIL # BLD AUTO: 0.2 K/UL (ref 0–0.5)
EOSINOPHIL NFR BLD: 3.3 % (ref 0–8)
ERYTHROCYTE [DISTWIDTH] IN BLOOD BY AUTOMATED COUNT: 18.3 % (ref 11.5–14.5)
EST. GFR  (AFRICAN AMERICAN): >60 ML/MIN/1.73 M^2
EST. GFR  (NON AFRICAN AMERICAN): >60 ML/MIN/1.73 M^2
GLUCOSE SERPL-MCNC: 100 MG/DL (ref 70–110)
HCT VFR BLD AUTO: 40.3 % (ref 40–54)
HGB BLD-MCNC: 13.3 G/DL (ref 14–18)
IMM GRANULOCYTES # BLD AUTO: 0.03 K/UL (ref 0–0.04)
IMM GRANULOCYTES NFR BLD AUTO: 0.4 % (ref 0–0.5)
LYMPHOCYTES # BLD AUTO: 2.5 K/UL (ref 1–4.8)
LYMPHOCYTES NFR BLD: 35 % (ref 18–48)
MAGNESIUM SERPL-MCNC: 2.2 MG/DL (ref 1.6–2.6)
MCH RBC QN AUTO: 31.1 PG (ref 27–31)
MCHC RBC AUTO-ENTMCNC: 33 G/DL (ref 32–36)
MCV RBC AUTO: 94 FL (ref 82–98)
MONOCYTES # BLD AUTO: 0.6 K/UL (ref 0.3–1)
MONOCYTES NFR BLD: 8.3 % (ref 4–15)
NEUTROPHILS # BLD AUTO: 3.7 K/UL (ref 1.8–7.7)
NEUTROPHILS NFR BLD: 51.9 % (ref 38–73)
NRBC BLD-RTO: 0 /100 WBC
PLATELET # BLD AUTO: 225 K/UL (ref 150–450)
PMV BLD AUTO: 9.8 FL (ref 9.2–12.9)
POTASSIUM SERPL-SCNC: 4.8 MMOL/L (ref 3.5–5.1)
PROT SERPL-MCNC: 6.9 G/DL (ref 6–8.4)
RBC # BLD AUTO: 4.27 M/UL (ref 4.6–6.2)
SODIUM SERPL-SCNC: 140 MMOL/L (ref 136–145)
WBC # BLD AUTO: 7.22 K/UL (ref 3.9–12.7)

## 2021-10-29 PROCEDURE — 83735 ASSAY OF MAGNESIUM: CPT | Performed by: INTERNAL MEDICINE

## 2021-10-29 PROCEDURE — 85025 COMPLETE CBC W/AUTO DIFF WBC: CPT | Performed by: INTERNAL MEDICINE

## 2021-10-29 PROCEDURE — 36415 COLL VENOUS BLD VENIPUNCTURE: CPT | Performed by: INTERNAL MEDICINE

## 2021-10-29 PROCEDURE — 80053 COMPREHEN METABOLIC PANEL: CPT | Performed by: INTERNAL MEDICINE

## 2021-11-01 ENCOUNTER — OFFICE VISIT (OUTPATIENT)
Dept: HEMATOLOGY/ONCOLOGY | Facility: CLINIC | Age: 76
End: 2021-11-01
Payer: MEDICARE

## 2021-11-01 ENCOUNTER — SPECIALTY PHARMACY (OUTPATIENT)
Dept: PHARMACY | Facility: CLINIC | Age: 76
End: 2021-11-01
Payer: MEDICARE

## 2021-11-01 ENCOUNTER — TELEPHONE (OUTPATIENT)
Dept: HEMATOLOGY/ONCOLOGY | Facility: CLINIC | Age: 76
End: 2021-11-01

## 2021-11-01 VITALS
WEIGHT: 214.75 LBS | RESPIRATION RATE: 20 BRPM | TEMPERATURE: 96 F | BODY MASS INDEX: 34.51 KG/M2 | DIASTOLIC BLOOD PRESSURE: 91 MMHG | HEIGHT: 66 IN | HEART RATE: 67 BPM | SYSTOLIC BLOOD PRESSURE: 175 MMHG | OXYGEN SATURATION: 100 %

## 2021-11-01 DIAGNOSIS — C18.1 CARCINOMA OF APPENDIX: Primary | ICD-10-CM

## 2021-11-01 DIAGNOSIS — C18.1 PRIMARY APPENDICEAL ADENOCARCINOMA: ICD-10-CM

## 2021-11-01 DIAGNOSIS — C18.1 CARCINOMA OF APPENDIX: ICD-10-CM

## 2021-11-01 DIAGNOSIS — E80.4 GILBERT'S SYNDROME: Primary | ICD-10-CM

## 2021-11-01 DIAGNOSIS — K81.0 ACUTE CHOLECYSTITIS: ICD-10-CM

## 2021-11-01 DIAGNOSIS — K35.32 ACUTE APPENDICITIS WITH PERFORATION, LOCALIZED PERITONITIS, AND GANGRENE, WITHOUT ABSCESS: ICD-10-CM

## 2021-11-01 DIAGNOSIS — Z01.818 EXAMINATION PRIOR TO CHEMOTHERAPY: ICD-10-CM

## 2021-11-01 PROCEDURE — 99215 OFFICE O/P EST HI 40 MIN: CPT | Mod: S$PBB,,, | Performed by: INTERNAL MEDICINE

## 2021-11-01 PROCEDURE — 99215 PR OFFICE/OUTPT VISIT, EST, LEVL V, 40-54 MIN: ICD-10-PCS | Mod: S$PBB,,, | Performed by: INTERNAL MEDICINE

## 2021-11-01 PROCEDURE — 99999 PR PBB SHADOW E&M-EST. PATIENT-LVL IV: ICD-10-PCS | Mod: PBBFAC,,, | Performed by: INTERNAL MEDICINE

## 2021-11-01 PROCEDURE — 99214 OFFICE O/P EST MOD 30 MIN: CPT | Mod: PBBFAC,PO | Performed by: INTERNAL MEDICINE

## 2021-11-01 PROCEDURE — 99999 PR PBB SHADOW E&M-EST. PATIENT-LVL IV: CPT | Mod: PBBFAC,,, | Performed by: INTERNAL MEDICINE

## 2021-11-01 RX ORDER — CAPECITABINE 500 MG/1
2000 TABLET, FILM COATED ORAL 2 TIMES DAILY
Qty: 112 TABLET | Refills: 6 | Status: CANCELLED | OUTPATIENT
Start: 2021-11-01

## 2021-11-03 RX ORDER — CAPECITABINE 500 MG/1
2000 TABLET, FILM COATED ORAL 2 TIMES DAILY
Qty: 112 TABLET | Refills: 6 | Status: SHIPPED | OUTPATIENT
Start: 2021-11-03 | End: 2022-01-20

## 2021-11-12 ENCOUNTER — SPECIALTY PHARMACY (OUTPATIENT)
Dept: PHARMACY | Facility: CLINIC | Age: 76
End: 2021-11-12
Payer: MEDICARE

## 2021-11-12 ENCOUNTER — LAB VISIT (OUTPATIENT)
Dept: LAB | Facility: HOSPITAL | Age: 76
End: 2021-11-12
Attending: INTERNAL MEDICINE
Payer: MEDICARE

## 2021-11-12 DIAGNOSIS — C18.1 PRIMARY APPENDICEAL ADENOCARCINOMA: ICD-10-CM

## 2021-11-12 DIAGNOSIS — E80.4 GILBERT'S SYNDROME: ICD-10-CM

## 2021-11-12 DIAGNOSIS — Z01.818 EXAMINATION PRIOR TO CHEMOTHERAPY: ICD-10-CM

## 2021-11-12 DIAGNOSIS — K81.0 ACUTE CHOLECYSTITIS: ICD-10-CM

## 2021-11-12 DIAGNOSIS — C18.1 CARCINOMA OF APPENDIX: ICD-10-CM

## 2021-11-12 LAB
ALBUMIN SERPL BCP-MCNC: 3.6 G/DL (ref 3.5–5.2)
ALP SERPL-CCNC: 55 U/L (ref 55–135)
ALT SERPL W/O P-5'-P-CCNC: 11 U/L (ref 10–44)
ANION GAP SERPL CALC-SCNC: 9 MMOL/L (ref 8–16)
AST SERPL-CCNC: 13 U/L (ref 10–40)
BASOPHILS # BLD AUTO: 0.05 K/UL (ref 0–0.2)
BASOPHILS NFR BLD: 0.8 % (ref 0–1.9)
BILIRUB SERPL-MCNC: 2 MG/DL (ref 0.1–1)
BUN SERPL-MCNC: 14 MG/DL (ref 8–23)
CALCIUM SERPL-MCNC: 8.6 MG/DL (ref 8.7–10.5)
CHLORIDE SERPL-SCNC: 106 MMOL/L (ref 95–110)
CO2 SERPL-SCNC: 26 MMOL/L (ref 23–29)
CREAT SERPL-MCNC: 0.9 MG/DL (ref 0.5–1.4)
DIFFERENTIAL METHOD: ABNORMAL
EOSINOPHIL # BLD AUTO: 0.2 K/UL (ref 0–0.5)
EOSINOPHIL NFR BLD: 2.4 % (ref 0–8)
ERYTHROCYTE [DISTWIDTH] IN BLOOD BY AUTOMATED COUNT: 17.3 % (ref 11.5–14.5)
EST. GFR  (AFRICAN AMERICAN): >60 ML/MIN/1.73 M^2
EST. GFR  (NON AFRICAN AMERICAN): >60 ML/MIN/1.73 M^2
GLUCOSE SERPL-MCNC: 121 MG/DL (ref 70–110)
HCT VFR BLD AUTO: 37.3 % (ref 40–54)
HGB BLD-MCNC: 12.9 G/DL (ref 14–18)
IMM GRANULOCYTES # BLD AUTO: 0.02 K/UL (ref 0–0.04)
IMM GRANULOCYTES NFR BLD AUTO: 0.3 % (ref 0–0.5)
LYMPHOCYTES # BLD AUTO: 1.9 K/UL (ref 1–4.8)
LYMPHOCYTES NFR BLD: 30.6 % (ref 18–48)
MAGNESIUM SERPL-MCNC: 2 MG/DL (ref 1.6–2.6)
MCH RBC QN AUTO: 32.3 PG (ref 27–31)
MCHC RBC AUTO-ENTMCNC: 34.6 G/DL (ref 32–36)
MCV RBC AUTO: 93 FL (ref 82–98)
MONOCYTES # BLD AUTO: 0.5 K/UL (ref 0.3–1)
MONOCYTES NFR BLD: 7.3 % (ref 4–15)
NEUTROPHILS # BLD AUTO: 3.7 K/UL (ref 1.8–7.7)
NEUTROPHILS NFR BLD: 58.6 % (ref 38–73)
NRBC BLD-RTO: 0 /100 WBC
PLATELET # BLD AUTO: 199 K/UL (ref 150–450)
PMV BLD AUTO: 9.8 FL (ref 9.2–12.9)
POTASSIUM SERPL-SCNC: 3.8 MMOL/L (ref 3.5–5.1)
PROT SERPL-MCNC: 6.3 G/DL (ref 6–8.4)
RBC # BLD AUTO: 4 M/UL (ref 4.6–6.2)
SODIUM SERPL-SCNC: 141 MMOL/L (ref 136–145)
WBC # BLD AUTO: 6.31 K/UL (ref 3.9–12.7)

## 2021-11-12 PROCEDURE — 83735 ASSAY OF MAGNESIUM: CPT | Performed by: INTERNAL MEDICINE

## 2021-11-12 PROCEDURE — 36415 COLL VENOUS BLD VENIPUNCTURE: CPT | Performed by: INTERNAL MEDICINE

## 2021-11-12 PROCEDURE — 80053 COMPREHEN METABOLIC PANEL: CPT | Performed by: INTERNAL MEDICINE

## 2021-11-12 PROCEDURE — 85025 COMPLETE CBC W/AUTO DIFF WBC: CPT | Performed by: INTERNAL MEDICINE

## 2021-11-15 ENCOUNTER — SPECIALTY PHARMACY (OUTPATIENT)
Dept: PHARMACY | Facility: CLINIC | Age: 76
End: 2021-11-15
Payer: MEDICARE

## 2021-11-15 ENCOUNTER — OFFICE VISIT (OUTPATIENT)
Dept: HEMATOLOGY/ONCOLOGY | Facility: CLINIC | Age: 76
End: 2021-11-15
Payer: MEDICARE

## 2021-11-15 VITALS
DIASTOLIC BLOOD PRESSURE: 73 MMHG | SYSTOLIC BLOOD PRESSURE: 147 MMHG | HEIGHT: 64 IN | RESPIRATION RATE: 19 BRPM | OXYGEN SATURATION: 99 % | TEMPERATURE: 97 F | WEIGHT: 218.25 LBS | HEART RATE: 63 BPM | BODY MASS INDEX: 37.26 KG/M2

## 2021-11-15 DIAGNOSIS — C18.1 PRIMARY APPENDICEAL ADENOCARCINOMA: ICD-10-CM

## 2021-11-15 DIAGNOSIS — Z01.818 EXAMINATION PRIOR TO CHEMOTHERAPY: ICD-10-CM

## 2021-11-15 DIAGNOSIS — E80.4 GILBERT'S SYNDROME: Primary | ICD-10-CM

## 2021-11-15 DIAGNOSIS — C18.1 CARCINOMA OF APPENDIX: ICD-10-CM

## 2021-11-15 PROCEDURE — 99999 PR PBB SHADOW E&M-EST. PATIENT-LVL IV: CPT | Mod: PBBFAC,,, | Performed by: INTERNAL MEDICINE

## 2021-11-15 PROCEDURE — 99215 OFFICE O/P EST HI 40 MIN: CPT | Mod: S$PBB,,, | Performed by: INTERNAL MEDICINE

## 2021-11-15 PROCEDURE — 99215 PR OFFICE/OUTPT VISIT, EST, LEVL V, 40-54 MIN: ICD-10-PCS | Mod: S$PBB,,, | Performed by: INTERNAL MEDICINE

## 2021-11-15 PROCEDURE — 99214 OFFICE O/P EST MOD 30 MIN: CPT | Mod: PBBFAC,PO | Performed by: INTERNAL MEDICINE

## 2021-11-15 PROCEDURE — 99999 PR PBB SHADOW E&M-EST. PATIENT-LVL IV: ICD-10-PCS | Mod: PBBFAC,,, | Performed by: INTERNAL MEDICINE

## 2021-12-03 ENCOUNTER — LAB VISIT (OUTPATIENT)
Dept: LAB | Facility: HOSPITAL | Age: 76
End: 2021-12-03
Attending: INTERNAL MEDICINE
Payer: MEDICARE

## 2021-12-03 DIAGNOSIS — C18.1 CARCINOMA OF APPENDIX: ICD-10-CM

## 2021-12-03 DIAGNOSIS — Z01.818 EXAMINATION PRIOR TO CHEMOTHERAPY: ICD-10-CM

## 2021-12-03 DIAGNOSIS — E80.4 GILBERT'S SYNDROME: ICD-10-CM

## 2021-12-03 DIAGNOSIS — C18.1 PRIMARY APPENDICEAL ADENOCARCINOMA: ICD-10-CM

## 2021-12-03 LAB
ALBUMIN SERPL BCP-MCNC: 3.9 G/DL (ref 3.5–5.2)
ALP SERPL-CCNC: 67 U/L (ref 55–135)
ALT SERPL W/O P-5'-P-CCNC: 13 U/L (ref 10–44)
ANION GAP SERPL CALC-SCNC: 10 MMOL/L (ref 8–16)
AST SERPL-CCNC: 15 U/L (ref 10–40)
BASOPHILS # BLD AUTO: 0.04 K/UL (ref 0–0.2)
BASOPHILS NFR BLD: 0.7 % (ref 0–1.9)
BILIRUB SERPL-MCNC: 3.1 MG/DL (ref 0.1–1)
BUN SERPL-MCNC: 15 MG/DL (ref 8–23)
CALCIUM SERPL-MCNC: 8.8 MG/DL (ref 8.7–10.5)
CHLORIDE SERPL-SCNC: 106 MMOL/L (ref 95–110)
CO2 SERPL-SCNC: 24 MMOL/L (ref 23–29)
CREAT SERPL-MCNC: 0.9 MG/DL (ref 0.5–1.4)
DIFFERENTIAL METHOD: ABNORMAL
EOSINOPHIL # BLD AUTO: 0.1 K/UL (ref 0–0.5)
EOSINOPHIL NFR BLD: 1.6 % (ref 0–8)
ERYTHROCYTE [DISTWIDTH] IN BLOOD BY AUTOMATED COUNT: 17 % (ref 11.5–14.5)
EST. GFR  (AFRICAN AMERICAN): >60 ML/MIN/1.73 M^2
EST. GFR  (NON AFRICAN AMERICAN): >60 ML/MIN/1.73 M^2
GLUCOSE SERPL-MCNC: 100 MG/DL (ref 70–110)
HCT VFR BLD AUTO: 38.9 % (ref 40–54)
HGB BLD-MCNC: 13.1 G/DL (ref 14–18)
IMM GRANULOCYTES # BLD AUTO: 0.01 K/UL (ref 0–0.04)
IMM GRANULOCYTES NFR BLD AUTO: 0.2 % (ref 0–0.5)
LYMPHOCYTES # BLD AUTO: 2 K/UL (ref 1–4.8)
LYMPHOCYTES NFR BLD: 34.5 % (ref 18–48)
MAGNESIUM SERPL-MCNC: 2.1 MG/DL (ref 1.6–2.6)
MCH RBC QN AUTO: 34 PG (ref 27–31)
MCHC RBC AUTO-ENTMCNC: 33.7 G/DL (ref 32–36)
MCV RBC AUTO: 101 FL (ref 82–98)
MONOCYTES # BLD AUTO: 0.6 K/UL (ref 0.3–1)
MONOCYTES NFR BLD: 10.6 % (ref 4–15)
NEUTROPHILS # BLD AUTO: 3 K/UL (ref 1.8–7.7)
NEUTROPHILS NFR BLD: 52.4 % (ref 38–73)
NRBC BLD-RTO: 0 /100 WBC
PLATELET # BLD AUTO: 177 K/UL (ref 150–450)
PMV BLD AUTO: 10.3 FL (ref 9.2–12.9)
POTASSIUM SERPL-SCNC: 4.5 MMOL/L (ref 3.5–5.1)
PROT SERPL-MCNC: 6.6 G/DL (ref 6–8.4)
RBC # BLD AUTO: 3.85 M/UL (ref 4.6–6.2)
SODIUM SERPL-SCNC: 140 MMOL/L (ref 136–145)
WBC # BLD AUTO: 5.65 K/UL (ref 3.9–12.7)

## 2021-12-03 PROCEDURE — 85025 COMPLETE CBC W/AUTO DIFF WBC: CPT | Performed by: INTERNAL MEDICINE

## 2021-12-03 PROCEDURE — 36415 COLL VENOUS BLD VENIPUNCTURE: CPT | Performed by: INTERNAL MEDICINE

## 2021-12-03 PROCEDURE — 80053 COMPREHEN METABOLIC PANEL: CPT | Performed by: INTERNAL MEDICINE

## 2021-12-03 PROCEDURE — 83735 ASSAY OF MAGNESIUM: CPT | Performed by: INTERNAL MEDICINE

## 2021-12-06 ENCOUNTER — OFFICE VISIT (OUTPATIENT)
Dept: HEMATOLOGY/ONCOLOGY | Facility: CLINIC | Age: 76
End: 2021-12-06
Payer: MEDICARE

## 2021-12-06 ENCOUNTER — SPECIALTY PHARMACY (OUTPATIENT)
Dept: PHARMACY | Facility: CLINIC | Age: 76
End: 2021-12-06
Payer: MEDICARE

## 2021-12-06 VITALS
HEART RATE: 66 BPM | BODY MASS INDEX: 35.08 KG/M2 | HEIGHT: 66 IN | WEIGHT: 218.25 LBS | TEMPERATURE: 98 F | DIASTOLIC BLOOD PRESSURE: 76 MMHG | SYSTOLIC BLOOD PRESSURE: 146 MMHG | RESPIRATION RATE: 17 BRPM | OXYGEN SATURATION: 97 %

## 2021-12-06 DIAGNOSIS — E80.4 GILBERT'S SYNDROME: Primary | Chronic | ICD-10-CM

## 2021-12-06 DIAGNOSIS — Z01.818 EXAMINATION PRIOR TO CHEMOTHERAPY: ICD-10-CM

## 2021-12-06 DIAGNOSIS — R74.01 ELEVATED TRANSAMINASE LEVEL: ICD-10-CM

## 2021-12-06 DIAGNOSIS — C18.1 PRIMARY APPENDICEAL ADENOCARCINOMA: ICD-10-CM

## 2021-12-06 PROCEDURE — 99214 OFFICE O/P EST MOD 30 MIN: CPT | Mod: PBBFAC,PO | Performed by: INTERNAL MEDICINE

## 2021-12-06 PROCEDURE — 99999 PR PBB SHADOW E&M-EST. PATIENT-LVL IV: CPT | Mod: PBBFAC,,, | Performed by: INTERNAL MEDICINE

## 2021-12-06 PROCEDURE — 99215 OFFICE O/P EST HI 40 MIN: CPT | Mod: S$PBB,,, | Performed by: INTERNAL MEDICINE

## 2021-12-06 PROCEDURE — 99999 PR PBB SHADOW E&M-EST. PATIENT-LVL IV: ICD-10-PCS | Mod: PBBFAC,,, | Performed by: INTERNAL MEDICINE

## 2021-12-06 PROCEDURE — 99215 PR OFFICE/OUTPT VISIT, EST, LEVL V, 40-54 MIN: ICD-10-PCS | Mod: S$PBB,,, | Performed by: INTERNAL MEDICINE

## 2021-12-23 ENCOUNTER — LAB VISIT (OUTPATIENT)
Dept: LAB | Facility: HOSPITAL | Age: 76
End: 2021-12-23
Attending: INTERNAL MEDICINE
Payer: MEDICARE

## 2021-12-23 DIAGNOSIS — R74.01 ELEVATED TRANSAMINASE LEVEL: ICD-10-CM

## 2021-12-23 DIAGNOSIS — E80.4 GILBERT'S SYNDROME: Chronic | ICD-10-CM

## 2021-12-23 DIAGNOSIS — C18.1 PRIMARY APPENDICEAL ADENOCARCINOMA: ICD-10-CM

## 2021-12-23 DIAGNOSIS — Z01.818 EXAMINATION PRIOR TO CHEMOTHERAPY: ICD-10-CM

## 2021-12-23 LAB
ALBUMIN SERPL BCP-MCNC: 4 G/DL (ref 3.5–5.2)
ALP SERPL-CCNC: 64 U/L (ref 55–135)
ALT SERPL W/O P-5'-P-CCNC: 13 U/L (ref 10–44)
ANION GAP SERPL CALC-SCNC: 9 MMOL/L (ref 8–16)
AST SERPL-CCNC: 15 U/L (ref 10–40)
BASOPHILS # BLD AUTO: 0.06 K/UL (ref 0–0.2)
BASOPHILS NFR BLD: 1 % (ref 0–1.9)
BILIRUB SERPL-MCNC: 2.5 MG/DL (ref 0.1–1)
BUN SERPL-MCNC: 17 MG/DL (ref 8–23)
CALCIUM SERPL-MCNC: 9 MG/DL (ref 8.7–10.5)
CHLORIDE SERPL-SCNC: 106 MMOL/L (ref 95–110)
CO2 SERPL-SCNC: 26 MMOL/L (ref 23–29)
CREAT SERPL-MCNC: 1 MG/DL (ref 0.5–1.4)
DIFFERENTIAL METHOD: ABNORMAL
EOSINOPHIL # BLD AUTO: 0.1 K/UL (ref 0–0.5)
EOSINOPHIL NFR BLD: 1.4 % (ref 0–8)
ERYTHROCYTE [DISTWIDTH] IN BLOOD BY AUTOMATED COUNT: 16.4 % (ref 11.5–14.5)
EST. GFR  (AFRICAN AMERICAN): >60 ML/MIN/1.73 M^2
EST. GFR  (NON AFRICAN AMERICAN): >60 ML/MIN/1.73 M^2
GLUCOSE SERPL-MCNC: 84 MG/DL (ref 70–110)
HCT VFR BLD AUTO: 37.7 % (ref 40–54)
HGB BLD-MCNC: 12.9 G/DL (ref 14–18)
IMM GRANULOCYTES # BLD AUTO: 0.02 K/UL (ref 0–0.04)
IMM GRANULOCYTES NFR BLD AUTO: 0.3 % (ref 0–0.5)
LYMPHOCYTES # BLD AUTO: 2 K/UL (ref 1–4.8)
LYMPHOCYTES NFR BLD: 33.7 % (ref 18–48)
MAGNESIUM SERPL-MCNC: 2.1 MG/DL (ref 1.6–2.6)
MCH RBC QN AUTO: 34.9 PG (ref 27–31)
MCHC RBC AUTO-ENTMCNC: 34.2 G/DL (ref 32–36)
MCV RBC AUTO: 102 FL (ref 82–98)
MONOCYTES # BLD AUTO: 0.8 K/UL (ref 0.3–1)
MONOCYTES NFR BLD: 12.9 % (ref 4–15)
NEUTROPHILS # BLD AUTO: 3 K/UL (ref 1.8–7.7)
NEUTROPHILS NFR BLD: 50.7 % (ref 38–73)
NRBC BLD-RTO: 0 /100 WBC
PLATELET # BLD AUTO: 175 K/UL (ref 150–450)
PMV BLD AUTO: 10 FL (ref 9.2–12.9)
POTASSIUM SERPL-SCNC: 4.3 MMOL/L (ref 3.5–5.1)
PROT SERPL-MCNC: 6.6 G/DL (ref 6–8.4)
RBC # BLD AUTO: 3.7 M/UL (ref 4.6–6.2)
SODIUM SERPL-SCNC: 141 MMOL/L (ref 136–145)
WBC # BLD AUTO: 5.82 K/UL (ref 3.9–12.7)

## 2021-12-23 PROCEDURE — 80053 COMPREHEN METABOLIC PANEL: CPT | Performed by: INTERNAL MEDICINE

## 2021-12-23 PROCEDURE — 85025 COMPLETE CBC W/AUTO DIFF WBC: CPT | Performed by: INTERNAL MEDICINE

## 2021-12-23 PROCEDURE — 36415 COLL VENOUS BLD VENIPUNCTURE: CPT | Performed by: INTERNAL MEDICINE

## 2021-12-23 PROCEDURE — 83735 ASSAY OF MAGNESIUM: CPT | Performed by: INTERNAL MEDICINE

## 2021-12-26 ENCOUNTER — PATIENT MESSAGE (OUTPATIENT)
Dept: HEMATOLOGY/ONCOLOGY | Facility: CLINIC | Age: 76
End: 2021-12-26
Payer: MEDICARE

## 2021-12-27 ENCOUNTER — SPECIALTY PHARMACY (OUTPATIENT)
Dept: PHARMACY | Facility: CLINIC | Age: 76
End: 2021-12-27
Payer: MEDICARE

## 2021-12-27 ENCOUNTER — OFFICE VISIT (OUTPATIENT)
Dept: HEMATOLOGY/ONCOLOGY | Facility: CLINIC | Age: 76
End: 2021-12-27
Payer: MEDICARE

## 2021-12-27 VITALS
BODY MASS INDEX: 35.36 KG/M2 | HEART RATE: 67 BPM | HEIGHT: 66 IN | OXYGEN SATURATION: 95 % | RESPIRATION RATE: 15 BRPM | SYSTOLIC BLOOD PRESSURE: 142 MMHG | TEMPERATURE: 98 F | DIASTOLIC BLOOD PRESSURE: 74 MMHG | WEIGHT: 220 LBS

## 2021-12-27 DIAGNOSIS — E80.4 GILBERT'S SYNDROME: ICD-10-CM

## 2021-12-27 DIAGNOSIS — C18.1 PRIMARY APPENDICEAL ADENOCARCINOMA: Primary | ICD-10-CM

## 2021-12-27 DIAGNOSIS — R74.01 ELEVATED TRANSAMINASE LEVEL: ICD-10-CM

## 2021-12-27 DIAGNOSIS — Z01.818 EXAMINATION PRIOR TO CHEMOTHERAPY: ICD-10-CM

## 2021-12-27 PROCEDURE — 99999 PR PBB SHADOW E&M-EST. PATIENT-LVL V: CPT | Mod: PBBFAC,,, | Performed by: INTERNAL MEDICINE

## 2021-12-27 PROCEDURE — 99215 OFFICE O/P EST HI 40 MIN: CPT | Mod: S$PBB,,, | Performed by: INTERNAL MEDICINE

## 2021-12-27 PROCEDURE — 99215 PR OFFICE/OUTPT VISIT, EST, LEVL V, 40-54 MIN: ICD-10-PCS | Mod: S$PBB,,, | Performed by: INTERNAL MEDICINE

## 2021-12-27 PROCEDURE — 99215 OFFICE O/P EST HI 40 MIN: CPT | Mod: PBBFAC,PO | Performed by: INTERNAL MEDICINE

## 2021-12-27 PROCEDURE — 99999 PR PBB SHADOW E&M-EST. PATIENT-LVL V: ICD-10-PCS | Mod: PBBFAC,,, | Performed by: INTERNAL MEDICINE

## 2022-01-14 ENCOUNTER — HOSPITAL ENCOUNTER (OUTPATIENT)
Dept: RADIOLOGY | Facility: HOSPITAL | Age: 77
Discharge: HOME | End: 2022-01-14
Attending: INTERNAL MEDICINE
Payer: MEDICARE

## 2022-01-14 DIAGNOSIS — R74.01 ELEVATED TRANSAMINASE LEVEL: ICD-10-CM

## 2022-01-14 DIAGNOSIS — C18.1 PRIMARY APPENDICEAL ADENOCARCINOMA: ICD-10-CM

## 2022-01-14 DIAGNOSIS — E80.4 GILBERT'S SYNDROME: ICD-10-CM

## 2022-01-14 DIAGNOSIS — Z01.818 EXAMINATION PRIOR TO CHEMOTHERAPY: ICD-10-CM

## 2022-01-14 PROCEDURE — A9698 NON-RAD CONTRAST MATERIALNOC: HCPCS

## 2022-01-14 PROCEDURE — 74177 CT ABD & PELVIS W/CONTRAST: CPT | Mod: 26,,, | Performed by: RADIOLOGY

## 2022-01-14 PROCEDURE — 74177 CT ABD & PELVIS W/CONTRAST: CPT | Mod: TC

## 2022-01-14 PROCEDURE — 74177 CT ABDOMEN PELVIS WITH CONTRAST: ICD-10-PCS | Mod: 26,,, | Performed by: RADIOLOGY

## 2022-01-14 PROCEDURE — 25500020 PHARM REV CODE 255

## 2022-01-14 RX ADMIN — IOHEXOL 1000 ML: 12 SOLUTION ORAL at 12:01

## 2022-01-14 RX ADMIN — IOHEXOL 100 ML: 350 INJECTION, SOLUTION INTRAVENOUS at 12:01

## 2022-01-18 ENCOUNTER — OFFICE VISIT (OUTPATIENT)
Dept: HEMATOLOGY/ONCOLOGY | Facility: CLINIC | Age: 77
End: 2022-01-18
Payer: MEDICARE

## 2022-01-18 ENCOUNTER — TELEPHONE (OUTPATIENT)
Dept: HEMATOLOGY/ONCOLOGY | Facility: CLINIC | Age: 77
End: 2022-01-18

## 2022-01-18 ENCOUNTER — SPECIALTY PHARMACY (OUTPATIENT)
Dept: PHARMACY | Facility: CLINIC | Age: 77
End: 2022-01-18
Payer: MEDICARE

## 2022-01-18 ENCOUNTER — TELEPHONE (OUTPATIENT)
Dept: HEMATOLOGY/ONCOLOGY | Facility: CLINIC | Age: 77
End: 2022-01-18
Payer: MEDICARE

## 2022-01-18 VITALS
RESPIRATION RATE: 19 BRPM | HEIGHT: 66 IN | SYSTOLIC BLOOD PRESSURE: 143 MMHG | WEIGHT: 221.31 LBS | BODY MASS INDEX: 35.57 KG/M2 | DIASTOLIC BLOOD PRESSURE: 80 MMHG | OXYGEN SATURATION: 99 % | TEMPERATURE: 97 F | HEART RATE: 63 BPM

## 2022-01-18 DIAGNOSIS — Z01.818 EXAMINATION PRIOR TO CHEMOTHERAPY: ICD-10-CM

## 2022-01-18 DIAGNOSIS — E80.4 GILBERT'S SYNDROME: Primary | ICD-10-CM

## 2022-01-18 DIAGNOSIS — K35.32 ACUTE APPENDICITIS WITH PERFORATION, LOCALIZED PERITONITIS, AND GANGRENE, WITHOUT ABSCESS: ICD-10-CM

## 2022-01-18 DIAGNOSIS — R74.01 ELEVATED TRANSAMINASE LEVEL: ICD-10-CM

## 2022-01-18 DIAGNOSIS — C18.1 CARCINOMA OF APPENDIX: ICD-10-CM

## 2022-01-18 DIAGNOSIS — C18.1 PRIMARY APPENDICEAL ADENOCARCINOMA: ICD-10-CM

## 2022-01-18 PROCEDURE — 99999 PR PBB SHADOW E&M-EST. PATIENT-LVL III: ICD-10-PCS | Mod: PBBFAC,,, | Performed by: INTERNAL MEDICINE

## 2022-01-18 PROCEDURE — 99999 PR PBB SHADOW E&M-EST. PATIENT-LVL III: CPT | Mod: PBBFAC,,, | Performed by: INTERNAL MEDICINE

## 2022-01-18 PROCEDURE — 99213 OFFICE O/P EST LOW 20 MIN: CPT | Mod: PBBFAC,PO | Performed by: INTERNAL MEDICINE

## 2022-01-18 PROCEDURE — 99215 PR OFFICE/OUTPT VISIT, EST, LEVL V, 40-54 MIN: ICD-10-PCS | Mod: S$PBB,,, | Performed by: INTERNAL MEDICINE

## 2022-01-18 PROCEDURE — 99215 OFFICE O/P EST HI 40 MIN: CPT | Mod: S$PBB,,, | Performed by: INTERNAL MEDICINE

## 2022-01-18 NOTE — TELEPHONE ENCOUNTER
Message has been routed to Dr Cartagena .     ----- Message from Maddy Alegre sent at 1/18/2022  2:59 PM CST -----  Contact: Jacquie [phone]  Type:  Pharmacy Calling to Clarify an RX    Name of Caller:Jacquie     Pharmacy Name:[facility]     Prescription Name:capecitabine (XELODA) 500 MG Tab    What do they need to clarify?:Needs corrective script sent over for updated directions of 3 tablets by mouth twice daily for two weeks on and one week off. Can not take verbal. Needs faxed over to [phone]    Best Call Back Number:[phone]    Additional Information:

## 2022-01-18 NOTE — PROGRESS NOTES
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  History of benign prostate hypertrophy with obstructive urine output.  Status post TURP by Dr Craig Reza on 21    History of prostate cancer.  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  Associated with acute appendicitis with a rupture.  Five benign lymph nodes retrieved.    Tumor size 0.8 cm.  Histological type adenocarcinoma.  Histologic  Grade moderately    Differentiated.  Tumor invades muscular peripheral.  Margin negative.  Tumor deposit negative.  Lymphovascular invasion negative.  Perineural invasion    Negative.  Pathological staging [id number]    On keep side being 1500mg b.i.d. daily 2 weeks on 1 week off    Past Medical History:   Diagnosis Date    BPH with obstruction/lower urinary tract symptoms     Cancer     prostate    Carcinoma of appendix     Coronary artery disease     Elevated PSA     Gilbert's disease 2021    Hyperlipidemia     Hypertension     MI (myocardial infarction)     Primary appendiceal adenocarcinoma     Stented coronary artery 2005    RCA     Social History     Socioeconomic History    Marital status:    Tobacco Use    Smoking status: Former Smoker     Quit date: 1991     Years since quittin.5    Smokeless tobacco: Never Used    Tobacco comment: cigars and pipe   Substance and Sexual Activity    Alcohol use: Not Currently    Drug use: Not Currently         Objective    Physical Exam     There were no vitals filed for this visit.  Awake alert no acute distress  Normocephalic atraumatic pupils equal round reactive  Normal respiratory effort  Normal rate  Distal pulses intact  Peripheral edema  Grossly cranial nerves 2-12 grossly intact sensorimotor grossly intact  Patient ambulatory on his own power        CMP  Sodium   Date Value Ref Range Status   2022 141 136 -  145 mmol/L Final     Potassium   Date Value Ref Range Status   01/14/2022 4.7 3.5 - 5.1 mmol/L Final     Chloride   Date Value Ref Range Status   01/14/2022 105 95 - 110 mmol/L Final     CO2   Date Value Ref Range Status   01/14/2022 26 23 - 29 mmol/L Final     Glucose   Date Value Ref Range Status   01/14/2022 110 70 - 110 mg/dL Final     BUN   Date Value Ref Range Status   01/14/2022 15 8 - 23 mg/dL Final     Creatinine   Date Value Ref Range Status   01/14/2022 1.0 0.5 - 1.4 mg/dL Final     Calcium   Date Value Ref Range Status   01/14/2022 8.9 8.7 - 10.5 mg/dL Final     Total Protein   Date Value Ref Range Status   01/14/2022 7.0 6.0 - 8.4 g/dL Final     Albumin   Date Value Ref Range Status   01/14/2022 4.2 3.5 - 5.2 g/dL Final     Total Bilirubin   Date Value Ref Range Status   01/14/2022 3.0 (H) 0.1 - 1.0 mg/dL Final     Comment:     For infants and newborns, interpretation of results should be based  on gestational age, weight and in agreement with clinical  observations.    Premature Infant recommended reference ranges:  Up to 24 hours.............<8.0 mg/dL  Up to 48 hours............<12.0 mg/dL  3-5 days..................<15.0 mg/dL  6-29 days.................<15.0 mg/dL       Alkaline Phosphatase   Date Value Ref Range Status   01/14/2022 68 55 - 135 U/L Final     AST   Date Value Ref Range Status   01/14/2022 19 10 - 40 U/L Final     ALT   Date Value Ref Range Status   01/14/2022 18 10 - 44 U/L Final     Anion Gap   Date Value Ref Range Status   01/14/2022 10 8 - 16 mmol/L Final     eGFR if    Date Value Ref Range Status   01/14/2022 >60 >60 mL/min/1.73 m^2 Final     eGFR if non    Date Value Ref Range Status   01/14/2022 >60 >60 mL/min/1.73 m^2 Final     Comment:     Calculation used to obtain the estimated glomerular filtration  rate (eGFR) is the CKD-EPI equation.        Lab Results   Component Value Date    WBC 6.03 01/14/2022    HGB 14.0 01/14/2022    HCT 39.6 (L)  01/14/2022     (H) 01/14/2022     01/14/2022 05/06/2021 surgical specimen  FINAL DIAGNOSIS:   05/10/2021 JAR/jr     ILEUM AND CECUM, ILEOCECETOMY   --APPENDIX WITH INVASIVE MODERATELY DIFFERENTIATED ADENOCARCINOMA.   --ASSOCIATED WITH ACUTE APPENDICITIS WITH RUPTURE.   --FIVE BENIGN LYMPH NODES.   --BACKGROUND COLON AND SMALL INTESTINE WITH SEROSAL ADHESIONS.   --SEE TUMOR SYNOPTIC REPORT.     Comment:   Additional dissection revealed one additional benign lymph node.     Assessment Plan    appendiceal adenocarcinoma.     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    I have discussed the case with Dr. Sundar Garcia.      With evidence of a rupture consider microscopic tumor deposit as a high risk factor.    Patient is 76 years old with comorbidities such as a previous diagnosis of MI as well as recent  TURP    Gilbert syndrome    > rupture high risk factor will have to consider microscopic tumor deposit.  However given patient's age and comorbidity it is unlikely that he will tolerate any aggressive measure.  I have spoken to surgical team, reported patient cannot tolerate additional surgery.    > capecitabine monotherapy 1000mg/m2 b.i.d. (from 1250mg/m2 due to underlying liver condition, age and comorbidities) daily 2 weeks on 1 week off for duration of  8 cycles.  Patient tolerating well    > gallbladder infection, elevated transaminitis, cholecystectomy on 10/11/2021.  Patient resume treatments.   I will see him on 12/26/21 with labs     > RTC 3 weeks with labs    > review ct scan with patient

## 2022-01-18 NOTE — TELEPHONE ENCOUNTER
Scheduled patient for follow-up and lab appointment per Dr. Cartagena's orders below. Linked labs to appointment. Reviewed AVS with patient. Gave patient thank you/survey sheet. The patient has no questions at this time.           ----- Message from Shaun Cartagena MD sent at 1/18/2022  9:47 AM CST -----  RTC 3 weeks with lab

## 2022-01-18 NOTE — TELEPHONE ENCOUNTER
Specialty Pharmacy - Refill Coordination    Specialty Medication Orders Linked to Encounter    Flowsheet Row Most Recent Value   Medication #1 capecitabine (XELODA) 500 MG Tab (Order#[id number], Rx#[id number])          Refill Questions - Documented Responses    Flowsheet Row Most Recent Value   Patient Availability and HIPAA Verification    Does patient want to proceed with activity? Yes   HIPAA/medical authority confirmed? Yes   Relationship to patient of person spoken to? Self   Refill Screening Questions    Changes to allergies? No   Changes to medications? No   New conditions since last clinic visit? No   Unplanned office visit, urgent care, ED, or hospital admission in the last 4 weeks? No   How does patient/caregiver feel medication is working? Excellent   Financial problems or insurance changes? No   How many doses of your specialty medications were missed in the last 4 weeks? 0   Would patient like to speak to a pharmacist? No   When does the patient need to receive the medication? 01/24/22   Refill Delivery Questions    How will the patient receive the medication? Delivery Suki   When does the patient need to receive the medication? 01/24/22   Shipping Address Home   Address in [facility] confirmed and updated if neccessary? Yes   Expected Copay ($) 94.81   Is the patient able to afford the medication copay? Yes   Payment Method CC on file   Days supply of Refill 21   Supplies needed? No supplies needed   Refill activity completed? Yes   Refill activity plan Refill scheduled   Shipment/Pickup Date: 01/19/22          Current Outpatient Medications   Medication Sig    albuterol (PROVENTIL/VENTOLIN HFA) 90 mcg/actuation inhaler Inhale 2 puffs into the lungs every 6 (six) hours as needed for Wheezing or Shortness of Breath. Rescue    amLODIPine (NORVASC) 10 MG tablet Take 1 tablet (10 mg total) by mouth every evening.    aspirin (ECOTRIN) 81 MG EC tablet Take 1 tablet (81 mg total) by mouth every  morning. Start on Sunday 6/28    capecitabine (XELODA) 500 MG Tab Take 4 tablets (2,000 mg total) by mouth 2 (two) times daily 2 weeks on, 1 week off.    capecitabine (XELODA) 500 MG Tab Take 4 tablets (2,000 mg total) by mouth 2 (two) times daily 2 weeks on, 1 week off.    carvedilol (COREG) 12.5 MG tablet 12.5 mg 2 (two) times daily.     CHOLECALCIFEROL, VITAMIN D3, ORAL Take 2,000 Units by mouth every morning.     clopidogreL (PLAVIX) 75 mg tablet Take 1 tablet (75 mg total) by mouth every morning. Start on Sunday 6/28    finasteride (PROSCAR) 5 mg tablet Take 1 tablet (5 mg total) by mouth once daily.    fish oil-omega-3 fatty acids 300-1,000 mg capsule Take 1 capsule by mouth 2 (two) times daily.     fluticasone (FLONASE) 50 mcg/actuation nasal spray 2 sprays by Each Nostril route daily as needed for Rhinitis.     HYDROcodone-acetaminophen (NORCO) 5-325 mg per tablet Take 1 tablet by mouth every 6 (six) hours as needed for Pain.    lovastatin (MEVACOR) 40 MG tablet Take 40 mg by mouth nightly.     MICARDIS 40 mg Tab Take 40 mg by mouth every morning.     NITROSTAT 0.4 mg SL tablet Place 0.4 mg under the tongue every 5 (five) minutes as needed for Chest pain.    Last reviewed on 1/18/2022  9:47 AM by Tawana Ulloa MA    Review of patient's allergies indicates:   Allergen Reactions    Ciprofloxacin      vomiting    Rapaflo [silodosin]      Urinating increased, kidney pain    Simvastatin Other (See Comments)     Severe muscle pain    Last reviewed on  1/18/2022 9:47 AM by Tawana Ulloa    Spoke with Mr. Yang. He denied missed doses. He reported next cycle is to start on 1/24. He reported a 0-day supply in his possession. He denied any changes in allergies, medical conditions, or medications. Delivery ERROL  scheduled for 1/19 for delivery on 1/19. $94.81 copay at 004. Confirmed 2 patient identifiers, allergies, medication list, comorbidities, shipping address, and payment  information.    [employer] message sent to Dr. Levine office on 1/18 requesting for updated capecitabine 500 mg prescription: take 3 tablets (2,000 mg total) by mouth 2 (two) times daily 2 weeks on, 1 week off.    Tasks added this encounter   2/5/2022 - Refill Call (Auto Added)   Tasks due within next 3 months   1/23/2022 - Clinical - Follow Up Assesement (90 day)     Santi Toro, PharmD  [facility] - Specialty Pharmacy  [address] [place] [id number]  Phone: [phone]  Fax: [phone]

## 2022-01-19 DIAGNOSIS — C18.1 CARCINOMA OF APPENDIX: ICD-10-CM

## 2022-01-19 DIAGNOSIS — K35.32 ACUTE APPENDICITIS WITH PERFORATION, LOCALIZED PERITONITIS, AND GANGRENE, WITHOUT ABSCESS: ICD-10-CM

## 2022-01-19 RX ORDER — CAPECITABINE 500 MG/1
TABLET, FILM COATED ORAL
Qty: 84 TABLET | Refills: 6 | Status: SHIPPED | OUTPATIENT
Start: 2022-01-19

## 2022-01-19 RX ORDER — CAPECITABINE 500 MG/1
TABLET, FILM COATED ORAL
Qty: 84 TABLET | Refills: 6 | Status: SHIPPED | OUTPATIENT
Start: 2022-01-19 | End: 2022-01-19 | Stop reason: SDUPTHER

## 2022-01-20 ENCOUNTER — SPECIALTY PHARMACY (OUTPATIENT)
Dept: PHARMACY | Facility: CLINIC | Age: 77
End: 2022-01-20
Payer: MEDICARE

## 2022-01-20 NOTE — TELEPHONE ENCOUNTER
Was able to receive new corrected script that now reflects how patient is currently taking his Xeloda. Patient is currently taking Xeloda 500mg: Take 3 tablets BID for 2 weeks on, 1 week off. Therapy and dosage are appropriate. Covered under Part B and no PA required. Again offered FA options; however, pt declined at this time. He is aware to reach out if he would like assistance with his copay moving forward.

## 2022-01-21 ENCOUNTER — OFFICE VISIT (OUTPATIENT)
Dept: UROLOGY | Facility: CLINIC | Age: 77
End: 2022-01-21
Payer: MEDICARE

## 2022-01-21 VITALS
HEART RATE: 69 BPM | WEIGHT: 221.31 LBS | HEIGHT: 66 IN | SYSTOLIC BLOOD PRESSURE: 162 MMHG | DIASTOLIC BLOOD PRESSURE: 96 MMHG | BODY MASS INDEX: 35.57 KG/M2

## 2022-01-21 DIAGNOSIS — R33.9 URINARY RETENTION: Primary | ICD-10-CM

## 2022-01-21 LAB — POC RESIDUAL URINE VOLUME: 26 ML (ref 0–100)

## 2022-01-21 PROCEDURE — 99999 PR PBB SHADOW E&M-EST. PATIENT-LVL III: CPT | Mod: PBBFAC,,, | Performed by: UROLOGY

## 2022-01-21 PROCEDURE — 99214 PR OFFICE/OUTPT VISIT, EST, LEVL IV, 30-39 MIN: ICD-10-PCS | Mod: S$PBB,,, | Performed by: UROLOGY

## 2022-01-21 PROCEDURE — 99214 OFFICE O/P EST MOD 30 MIN: CPT | Mod: S$PBB,,, | Performed by: UROLOGY

## 2022-01-21 PROCEDURE — 99213 OFFICE O/P EST LOW 20 MIN: CPT | Mod: PBBFAC,PN | Performed by: UROLOGY

## 2022-01-21 PROCEDURE — 99999 PR PBB SHADOW E&M-EST. PATIENT-LVL III: ICD-10-PCS | Mod: PBBFAC,,, | Performed by: UROLOGY

## 2022-01-21 PROCEDURE — 51798 US URINE CAPACITY MEASURE: CPT | Mod: PBBFAC,PN | Performed by: UROLOGY

## 2022-01-21 NOTE — PROGRESS NOTES
[facility] Established Patient/H&P:    Ryan Stewart is a 76 y.o. male who presents for follow up for lower urinary tract symptoms.     Patient with a history of elevated PSA, gross hematuria and enlarged prostate previously managed with Dr. Quintanilla. He presented to [facility] ED with urinary retention after appendectomy on 5/6/21 for ruptured appendicitis. 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     Alexis catheter placed with 1.5L urine. CT renal stone on 5/17/21 revealed a significantly enlarged prostate.  He underwent voiding trial in clinic with NP Eliana Baez, but had the alexis replaced. He has since developed gross hematuria. He states that prior to his appendectomy he was voiding well. He has been on Flomax for several years. Now on Finasteride.     On review of records, he has a history of elevated PSA s/p biopsy in [place] in 2002. Declined any further biopsies. He is on Plavix for history of MI.        Interval History     7/22/21: Patient with a 170 cc prostate s/p TURP on 6/22/21. Pathology negative. Discharged home POD 2. Underwent successful voiding trial 1 week post-op. States his lower urinary tract symptoms have improved significantly. Remains on Flomax and Finasteride.      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     1/21/22: Patient is doing well. Continues to void fine with mild lower urinary tract symptoms. IPSS 4. QoL 0. PVR 26 mL. Discontinued Flomax and Finasteride.      Denies any fever, chills, flank pain, nephrolithiasis,  trauma or history of  malignancy.         PSA  5.3                   11/3/20  4.7                   8/19/19  5.3                   9/4/18  6.0                   7/8/16  8.9                   9/4/13     Urine culture  Enterococcus 6/2/21  Enterococcus  5/6/21     IPSS    QoL  6          1          7/22/21     PVR  26 mL  1/21/22  0 mL                7/22/21  109 mL            6/29/21    Past Medical History:    Diagnosis Date    BPH with obstruction/lower urinary tract symptoms     Cancer     prostate    Carcinoma of appendix     Coronary artery disease     Elevated PSA     Gilbert's disease 09/28/2021    Hyperlipidemia     Hypertension     MI (myocardial infarction) 2005    Primary appendiceal adenocarcinoma     Stented coronary artery 2005    RCA       Past Surgical History:   Procedure Laterality Date    APPENDECTOMY      CARDIAC SURGERY      stent coronary    COLON SURGERY      CYSTOSCOPY      CYSTOSCOPY N/A 6/2/2021    Procedure: CYSTOSCOPY;  Surgeon: Craig Reza Jr., MD;  Location: [facility] OR;  Service: Urology;  Laterality: N/A;    EAR EXAMINATION UNDER ANESTHESIA      LAPAROSCOPIC APPENDECTOMY N/A 5/6/2021    Procedure: APPENDECTOMY, LAPAROSCOPIC;  Surgeon: Sundar Garcia MD;  Location: [facility] OR;  Service: General;  Laterality: N/A;    LAPAROSCOPIC CHOLECYSTECTOMY N/A 10/10/2021    Procedure: CHOLECYSTECTOMY, LAPAROSCOPIC;  Surgeon: Eulogio Yusuf MD;  Location: [facility] OR;  Service: General;  Laterality: N/A;    SURGICAL REMOVAL OF ILEUM WITH CECUM  5/6/2021    Procedure: EXCISION, CECUM WITH ILEUM;  Surgeon: Sundar Garcia MD;  Location: [facility] OR;  Service: General;;    TRANSRECTAL ULTRASOUND EXAMINATION N/A 6/2/2021    Procedure: ULTRASOUND, RECTAL APPROACH;  Surgeon: Craig Reza Jr., MD;  Location: [facility] OR;  Service: Urology;  Laterality: N/A;    TRANSURETHRAL RESECTION OF PROSTATE N/A 6/22/2021    Procedure: TURP (TRANSURETHRAL RESECTION OF PROSTATE);  Surgeon: Craig Reza Jr., MD;  Location: [facility] OR;  Service: Urology;  Laterality: N/A;       Review of patient's allergies indicates:   Allergen Reactions    Ciprofloxacin      vomiting    Rapaflo [silodosin]      Urinating increased, kidney pain    Simvastatin Other (See Comments)     Severe muscle pain       Medications Reviewed: see MAR    FOCUSED PHYSICAL EXAM:    Vitals:    01/21/22 1052   BP: (!) 162/96   Pulse: 69  "    Body mass index is 35.73 kg/m². Weight: 100.4 kg (221 lb 5.5 oz) Height: 5' 6" (167.6 cm)       General: Alert, cooperative, no distress, appears stated age  Abdomen: Soft, non-tender, no CVA tenderness, non-distended    LABS:    Recent Results (from the past 336 hour(s))   CBC w/ DIFF    Collection Time: 01/14/22 10:52 AM   Result Value Ref Range    WBC 6.03 3.90 - 12.70 K/uL    RBC 3.88 (L) 4.60 - 6.20 M/uL    Hemoglobin 14.0 14.0 - 18.0 g/dL    Hematocrit 39.6 (L) 40.0 - 54.0 %     (H) 82 - 98 fL    MCH 36.1 (H) 27.0 - 31.0 pg    MCHC 35.4 32.0 - 36.0 g/dL    RDW 16.1 (H) 11.5 - 14.5 %    Platelets 175 150 - 450 K/uL    MPV 9.7 9.2 - 12.9 fL    Immature Granulocytes 0.3 0.0 - 0.5 %    Gran # (ANC) 3.0 1.8 - 7.7 K/uL    Immature Grans (Abs) 0.02 0.00 - 0.04 K/uL    Lymph # 2.3 1.0 - 4.8 K/uL    Mono # 0.6 0.3 - 1.0 K/uL    Eos # 0.1 0.0 - 0.5 K/uL    Baso # 0.09 0.00 - 0.20 K/uL    nRBC 0 0 /100 WBC    Gran % 49.1 38.0 - 73.0 %    Lymph % 38.5 18.0 - 48.0 %    Mono % 9.3 4.0 - 15.0 %    Eosinophil % 1.3 0.0 - 8.0 %    Basophil % 1.5 0.0 - 1.9 %    Differential Method Automated    CMP    Collection Time: 01/14/22 10:52 AM   Result Value Ref Range    Sodium 141 136 - 145 mmol/L    Potassium 4.7 3.5 - 5.1 mmol/L    Chloride 105 95 - 110 mmol/L    CO2 26 23 - 29 mmol/L    Glucose 110 70 - 110 mg/dL    BUN 15 8 - 23 mg/dL    Creatinine 1.0 0.5 - 1.4 mg/dL    Calcium 8.9 8.7 - 10.5 mg/dL    Total Protein 7.0 6.0 - 8.4 g/dL    Albumin 4.2 3.5 - 5.2 g/dL    Total Bilirubin 3.0 (H) 0.1 - 1.0 mg/dL    Alkaline Phosphatase 68 55 - 135 U/L    AST 19 10 - 40 U/L    ALT 18 10 - 44 U/L    Anion Gap 10 8 - 16 mmol/L    eGFR if African American >60 >60 mL/min/1.73 m^2    eGFR if non African American >60 >60 mL/min/1.73 m^2   Magnesium    Collection Time: 01/14/22 10:52 AM   Result Value Ref Range    Magnesium 2.1 1.6 - 2.6 mg/dL   POCT Bladder Scan    Collection Time: 01/21/22 10:54 AM   Result Value Ref Range    POC " Residual Urine Volume 26 0 - 100 mL             Assessment/Diagnosis:    1. Urinary retention  POCT Bladder Scan       Plans:    - I spent 30 minutes of the day of this encounter preparing for, treating and managing this patient. Patient is doing well s/p TURP on 6/22/21. Pathology negative. LUTs have improved significantly. Has discontinued Flomax and Finasteride.   - Discontinue prostate cancer screening given his age and co-morbidities.  - RTC in 1 year with symptom score and PVR.

## 2022-02-04 ENCOUNTER — LAB VISIT (OUTPATIENT)
Dept: LAB | Facility: HOSPITAL | Age: 77
End: 2022-02-04
Attending: INTERNAL MEDICINE
Payer: MEDICARE

## 2022-02-04 DIAGNOSIS — E80.4 GILBERT'S SYNDROME: ICD-10-CM

## 2022-02-04 DIAGNOSIS — C18.1 CARCINOMA OF APPENDIX: ICD-10-CM

## 2022-02-04 DIAGNOSIS — R74.01 ELEVATED TRANSAMINASE LEVEL: ICD-10-CM

## 2022-02-04 DIAGNOSIS — Z01.818 EXAMINATION PRIOR TO CHEMOTHERAPY: ICD-10-CM

## 2022-02-04 DIAGNOSIS — C18.1 PRIMARY APPENDICEAL ADENOCARCINOMA: ICD-10-CM

## 2022-02-04 LAB
ALBUMIN SERPL BCP-MCNC: 4 G/DL (ref 3.5–5.2)
ALP SERPL-CCNC: 66 U/L (ref 55–135)
ALT SERPL W/O P-5'-P-CCNC: 17 U/L (ref 10–44)
ANION GAP SERPL CALC-SCNC: 9 MMOL/L (ref 8–16)
AST SERPL-CCNC: 19 U/L (ref 10–40)
BASOPHILS # BLD AUTO: 0.06 K/UL (ref 0–0.2)
BASOPHILS NFR BLD: 1.1 % (ref 0–1.9)
BILIRUB SERPL-MCNC: 3.2 MG/DL (ref 0.1–1)
BUN SERPL-MCNC: 17 MG/DL (ref 8–23)
CALCIUM SERPL-MCNC: 8.8 MG/DL (ref 8.7–10.5)
CHLORIDE SERPL-SCNC: 104 MMOL/L (ref 95–110)
CO2 SERPL-SCNC: 28 MMOL/L (ref 23–29)
CREAT SERPL-MCNC: 0.9 MG/DL (ref 0.5–1.4)
DIFFERENTIAL METHOD: ABNORMAL
EOSINOPHIL # BLD AUTO: 0.1 K/UL (ref 0–0.5)
EOSINOPHIL NFR BLD: 1.7 % (ref 0–8)
ERYTHROCYTE [DISTWIDTH] IN BLOOD BY AUTOMATED COUNT: 15.4 % (ref 11.5–14.5)
EST. GFR  (AFRICAN AMERICAN): >60 ML/MIN/1.73 M^2
EST. GFR  (NON AFRICAN AMERICAN): >60 ML/MIN/1.73 M^2
GLUCOSE SERPL-MCNC: 112 MG/DL (ref 70–110)
HCT VFR BLD AUTO: 37.9 % (ref 40–54)
HGB BLD-MCNC: 13.4 G/DL (ref 14–18)
IMM GRANULOCYTES # BLD AUTO: 0.01 K/UL (ref 0–0.04)
IMM GRANULOCYTES NFR BLD AUTO: 0.2 % (ref 0–0.5)
LYMPHOCYTES # BLD AUTO: 1.8 K/UL (ref 1–4.8)
LYMPHOCYTES NFR BLD: 32.3 % (ref 18–48)
MAGNESIUM SERPL-MCNC: 2.1 MG/DL (ref 1.6–2.6)
MCH RBC QN AUTO: 36 PG (ref 27–31)
MCHC RBC AUTO-ENTMCNC: 35.4 G/DL (ref 32–36)
MCV RBC AUTO: 102 FL (ref 82–98)
MONOCYTES # BLD AUTO: 0.6 K/UL (ref 0.3–1)
MONOCYTES NFR BLD: 11.8 % (ref 4–15)
NEUTROPHILS # BLD AUTO: 2.9 K/UL (ref 1.8–7.7)
NEUTROPHILS NFR BLD: 52.9 % (ref 38–73)
NRBC BLD-RTO: 0 /100 WBC
PLATELET # BLD AUTO: 158 K/UL (ref 150–450)
PMV BLD AUTO: 9.6 FL (ref 9.2–12.9)
POTASSIUM SERPL-SCNC: 4.1 MMOL/L (ref 3.5–5.1)
PROT SERPL-MCNC: 6.7 G/DL (ref 6–8.4)
RBC # BLD AUTO: 3.72 M/UL (ref 4.6–6.2)
SODIUM SERPL-SCNC: 141 MMOL/L (ref 136–145)
WBC # BLD AUTO: 5.42 K/UL (ref 3.9–12.7)

## 2022-02-04 PROCEDURE — 36415 COLL VENOUS BLD VENIPUNCTURE: CPT | Performed by: INTERNAL MEDICINE

## 2022-02-04 PROCEDURE — 85025 COMPLETE CBC W/AUTO DIFF WBC: CPT | Performed by: INTERNAL MEDICINE

## 2022-02-04 PROCEDURE — 80053 COMPREHEN METABOLIC PANEL: CPT | Performed by: INTERNAL MEDICINE

## 2022-02-04 PROCEDURE — 83735 ASSAY OF MAGNESIUM: CPT | Performed by: INTERNAL MEDICINE

## 2022-02-07 ENCOUNTER — SPECIALTY PHARMACY (OUTPATIENT)
Dept: PHARMACY | Facility: CLINIC | Age: 77
End: 2022-02-07
Payer: MEDICARE

## 2022-02-07 ENCOUNTER — OFFICE VISIT (OUTPATIENT)
Dept: HEMATOLOGY/ONCOLOGY | Facility: CLINIC | Age: 77
End: 2022-02-07
Payer: MEDICARE

## 2022-02-07 VITALS
BODY MASS INDEX: 35.61 KG/M2 | SYSTOLIC BLOOD PRESSURE: 146 MMHG | HEIGHT: 66 IN | WEIGHT: 221.56 LBS | DIASTOLIC BLOOD PRESSURE: 77 MMHG | RESPIRATION RATE: 19 BRPM | OXYGEN SATURATION: 99 % | TEMPERATURE: 97 F | HEART RATE: 62 BPM

## 2022-02-07 DIAGNOSIS — Z01.818 EXAMINATION PRIOR TO CHEMOTHERAPY: ICD-10-CM

## 2022-02-07 DIAGNOSIS — E80.4 GILBERT'S SYNDROME: ICD-10-CM

## 2022-02-07 DIAGNOSIS — C18.1 CARCINOMA OF APPENDIX: Primary | ICD-10-CM

## 2022-02-07 PROCEDURE — 99213 OFFICE O/P EST LOW 20 MIN: CPT | Mod: PBBFAC,PO | Performed by: INTERNAL MEDICINE

## 2022-02-07 PROCEDURE — 99999 PR PBB SHADOW E&M-EST. PATIENT-LVL III: CPT | Mod: PBBFAC,,, | Performed by: INTERNAL MEDICINE

## 2022-02-07 PROCEDURE — 99215 PR OFFICE/OUTPT VISIT, EST, LEVL V, 40-54 MIN: ICD-10-PCS | Mod: S$PBB,,, | Performed by: INTERNAL MEDICINE

## 2022-02-07 PROCEDURE — 99999 PR PBB SHADOW E&M-EST. PATIENT-LVL III: ICD-10-PCS | Mod: PBBFAC,,, | Performed by: INTERNAL MEDICINE

## 2022-02-07 PROCEDURE — 99215 OFFICE O/P EST HI 40 MIN: CPT | Mod: S$PBB,,, | Performed by: INTERNAL MEDICINE

## 2022-02-07 NOTE — PROGRESS NOTES
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  History of benign prostate hypertrophy with obstructive urine output.  Status post TURP by Dr Craig Reza on 21    History of prostate cancer.  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  Associated with acute appendicitis with a rupture.  Five benign lymph nodes retrieved.    Tumor size 0.8 cm.  Histological type adenocarcinoma.  Histologic  Grade moderately    Differentiated.  Tumor invades muscular peripheral.  Margin negative.  Tumor deposit negative.  Lymphovascular invasion negative.  Perineural invasion    Negative.  Pathological staging [id number]    On keep side being 1500mg b.i.d. daily 2 weeks on 1 week off    Past Medical History:   Diagnosis Date    BPH with obstruction/lower urinary tract symptoms     Cancer     prostate    Carcinoma of appendix     Coronary artery disease     Elevated PSA     Gilbert's disease 2021    Hyperlipidemia     Hypertension     MI (myocardial infarction)     Primary appendiceal adenocarcinoma     Stented coronary artery 2005    RCA     Social History     Socioeconomic History    Marital status:    Tobacco Use    Smoking status: Former Smoker     Quit date: 1991     Years since quittin.6    Smokeless tobacco: Never Used    Tobacco comment: cigars and pipe   Substance and Sexual Activity    Alcohol use: Not Currently    Drug use: Not Currently         Objective    Physical Exam     Vitals:    22 0927   BP: (!) 146/77   Pulse: 62   Resp: 19   Temp: 96.8 °F (36 °C)     Awake alert no acute distress  Normocephalic atraumatic pupils equal round reactive  Normal respiratory effort  Normal rate  Distal pulses intact  Peripheral edema  Grossly cranial nerves 2-12 grossly intact sensorimotor grossly intact  Patient ambulatory on his own power        [facility]  Sodium   Date  Value Ref Range Status   02/04/2022 141 136 - 145 mmol/L Final     Potassium   Date Value Ref Range Status   02/04/2022 4.1 3.5 - 5.1 mmol/L Final     Chloride   Date Value Ref Range Status   02/04/2022 104 95 - 110 mmol/L Final     CO2   Date Value Ref Range Status   02/04/2022 28 23 - 29 mmol/L Final     Glucose   Date Value Ref Range Status   02/04/2022 112 (H) 70 - 110 mg/dL Final     BUN   Date Value Ref Range Status   02/04/2022 17 8 - 23 mg/dL Final     Creatinine   Date Value Ref Range Status   02/04/2022 0.9 0.5 - 1.4 mg/dL Final     Calcium   Date Value Ref Range Status   02/04/2022 8.8 8.7 - 10.5 mg/dL Final     Total Protein   Date Value Ref Range Status   02/04/2022 6.7 6.0 - 8.4 g/dL Final     Albumin   Date Value Ref Range Status   02/04/2022 4.0 3.5 - 5.2 g/dL Final     Total Bilirubin   Date Value Ref Range Status   02/04/2022 3.2 (H) 0.1 - 1.0 mg/dL Final     Comment:     For infants and newborns, interpretation of results should be based  on gestational age, weight and in agreement with clinical  observations.    Premature Infant recommended reference ranges:  Up to 24 hours.............<8.0 mg/dL  Up to 48 hours............<12.0 mg/dL  3-5 days..................<15.0 mg/dL  6-29 days.................<15.0 mg/dL       Alkaline Phosphatase   Date Value Ref Range Status   02/04/2022 66 55 - 135 U/L Final     AST   Date Value Ref Range Status   02/04/2022 19 10 - 40 U/L Final     ALT   Date Value Ref Range Status   02/04/2022 17 10 - 44 U/L Final     Anion Gap   Date Value Ref Range Status   02/04/2022 9 8 - 16 mmol/L Final     eGFR if    Date Value Ref Range Status   02/04/2022 >60 >60 mL/min/1.73 m^2 Final     eGFR if non    Date Value Ref Range Status   02/04/2022 >60 >60 mL/min/1.73 m^2 Final     Comment:     Calculation used to obtain the estimated glomerular filtration  rate (eGFR) is the CKD-EPI equation.        Lab Results   Component Value Date    WBC 5.42  02/04/2022    HGB 13.4 (L) 02/04/2022    HCT 37.9 (L) 02/04/2022     (H) 02/04/2022     02/04/2022 05/06/2021 surgical specimen  FINAL DIAGNOSIS:   05/10/2021 JAR/jr     ILEUM AND CECUM, ILEOCECETOMY   --APPENDIX WITH INVASIVE MODERATELY DIFFERENTIATED ADENOCARCINOMA.   --ASSOCIATED WITH ACUTE APPENDICITIS WITH RUPTURE.   --FIVE BENIGN LYMPH NODES.   --BACKGROUND COLON AND SMALL INTESTINE WITH SEROSAL ADHESIONS.   --SEE TUMOR SYNOPTIC REPORT.     Comment:   Additional dissection revealed one additional benign lymph node.     Assessment Plan    appendiceal adenocarcinoma.     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    I have discussed the case with Dr. Sundar Garcia.      With evidence of a rupture consider microscopic tumor deposit as a high risk factor.    Patient is 76 years old with comorbidities such as a previous diagnosis of MI as well as recent  TURP    Gilbert syndrome    > rupture high risk factor will have to consider microscopic tumor deposit.  However given patient's age and comorbidity it is unlikely that he will tolerate any aggressive measure.  I have spoken to surgical team, reported patient cannot tolerate additional surgery.    > capecitabine monotherapy 1000mg/m2 b.i.d. (from 1250mg/m2 due to underlying liver condition, age and  comorbidities) daily 2 weeks on 1 week off for duration of 8 cycles.  Patient tolerating well    > gallbladder infection, elevated transaminitis, cholecystectomy on 10/11/2021.  Patient resume treatments.   I will see him on 12/26/21 with labs     > RTC 3 weeks with labs

## 2022-02-07 NOTE — TELEPHONE ENCOUNTER
Specialty Pharmacy - Refill Coordination    Specialty Medication Orders Linked to Encounter    Flowsheet Row Most Recent Value   Medication #1 capecitabine (XELODA) 500 MG Tab (Order#[id number], Rx#[id number])          Refill Questions - Documented Responses    Flowsheet Row Most Recent Value   Patient Availability and HIPAA Verification    Does patient want to proceed with activity? Yes   HIPAA/medical authority confirmed? Yes   Relationship to patient of person spoken to? Self   Refill Screening Questions    Changes to allergies? No   Changes to medications? No   New conditions since last clinic visit? No   Unplanned office visit, urgent care, ED, or hospital admission in the last 4 weeks? No   How does patient/caregiver feel medication is working? Very good   Financial problems or insurance changes? No   How many doses of your specialty medications were missed in the last 4 weeks? 0   Would patient like to speak to a pharmacist? Yes  [Due for follow up. Transfered to [facility]. Patient also requested a calendar]   When does the patient need to receive the medication? 02/10/22   Refill Delivery Questions    How will the patient receive the medication? Delivery Suki   When does the patient need to receive the medication? 02/10/22   Shipping Address Home   Address in [facility] confirmed and updated if neccessary? Yes   Expected Copay ($) 17   Is the patient able to afford the medication copay? Yes   Payment Method CC on file   Days supply of Refill 21   Supplies needed? No supplies needed   Refill activity completed? Yes   Refill activity plan Refill scheduled   Shipment/Pickup Date: 02/09/22          Current Outpatient Medications   Medication Sig    albuterol (PROVENTIL/VENTOLIN HFA) 90 mcg/actuation inhaler Inhale 2 puffs into the lungs every 6 (six) hours as needed for Wheezing or Shortness of Breath. Rescue    amLODIPine (NORVASC) 10 MG tablet Take 1 tablet (10 mg total) by mouth every evening.     aspirin (ECOTRIN) 81 MG EC tablet Take 1 tablet (81 mg total) by mouth every morning. Start on Sunday 6/28    capecitabine (XELODA) 500 MG Tab Takes 3 tablets by mouth 2 times daily 2 weeks on 1 week off..    carvedilol (COREG) 12.5 MG tablet 12.5 mg 2 (two) times daily.     CHOLECALCIFEROL, VITAMIN D3, ORAL Take 2,000 Units by mouth every morning.     clopidogreL (PLAVIX) 75 mg tablet Take 1 tablet (75 mg total) by mouth every morning. Start on Sunday 6/28    fish oil-omega-3 fatty acids 300-1,000 mg capsule Take 1 capsule by mouth 2 (two) times daily.     fluticasone (FLONASE) 50 mcg/actuation nasal spray 2 sprays by Each Nostril route daily as needed for Rhinitis.     HYDROcodone-acetaminophen (NORCO) 5-325 mg per tablet Take 1 tablet by mouth every 6 (six) hours as needed for Pain.    lovastatin (MEVACOR) 40 MG tablet Take 40 mg by mouth nightly.     MICARDIS 40 mg Tab Take 40 mg by mouth every morning.     NITROSTAT 0.4 mg SL tablet Place 0.4 mg under the tongue every 5 (five) minutes as needed for Chest pain.    Last reviewed on 2/7/2022  9:27 AM by Tawana Ulloa MA    Review of patient's allergies indicates:   Allergen Reactions    Ciprofloxacin      vomiting    Rapaflo [silodosin]      Urinating increased, kidney pain    Simvastatin Other (See Comments)     Severe muscle pain    Last reviewed on  2/7/2022 9:26 AM by Tawana Ulloa      Tasks added this encounter   2/21/2022 - Refill Call (Auto Added)   Tasks due within next 3 months   2/4/2022 - Clinical - Follow Up Assesement (90 day)     Isaac Laughlin, PharmD  [facility]  [address] [place] [id number]  Phone: [phone]  Fax: [phone]

## 2022-02-07 NOTE — TELEPHONE ENCOUNTER
Reviewed labs. Patient's bilirubin is 3.2, up from 3.0.  I will reach out to the provider regarding elevated bilirubin. PI suggests holding for bilirubin levels greater than 3 X ULN. Xeloda should be interrupted immediately until the hyperbilirubinemia decreased to less than or equal to 3.0 X ULN.

## 2022-02-07 NOTE — TELEPHONE ENCOUNTER
Ryan Stewart is a 76 y.o. male, who is followed by the specialty pharmacy service for management and education of his Xeloda.  He has been on therapy with Xeloda for 08/02/2021 .  I have reviewed his electronic medical record and current medication list and determined that specialty medication adjustment Is not needed at this time.    Patient has experienced adverse events such as muscle and joint pain, and is managed in the following way(s): Patient uses a calendar. .  He Is adherent reporting 0 missed doses since last review.  Adherence has been encouraged with the following mechanism(s): calendar.  He is on target to meet goals of therapy and will continue treatment.    Follow Up Review 2/7/2022 1/18/2022 12/27/2021 12/6/2021 11/15/2021   # of missed doses 0 0 0 0 0   New Medications? No No No No No   New Conditions? No No No No No   New Allergies? No No No No No   Medication Effective? Very good Excellent Excellent Good Good   Some recent data might be hidden       Therapy is appropriate to continue.    Therapy is effective: Yes  Recommendations: none at this time.  Review Method: Patient Contact     Reviewed labs. Patient's bilirubin is 3.2, up from 3.0.  I will reach out to the provider regarding elevated bilirubin. PI suggests holding for bilirubin levels greater than 3 X ULN. Xeloda should be interrupted immediately until the hyperbilirubinemia decreased to less than or equal to 3.0 X ULN.     ISAAC RAMAN, PharmD  [facility]  [address] [facility] [address]  Phone: [phone]  Fax: [phone]

## 2022-02-21 NOTE — TELEPHONE ENCOUNTER
Received response from Dr. Cartagena on 2/10 who stated that he is aware of pt's Tbili levels and does not wish to adjust the dose of pt's Xeloda at this time. Will continue to monitor Tbili levels closely and again make pt high risk to monitor.

## 2022-02-24 ENCOUNTER — SPECIALTY PHARMACY (OUTPATIENT)
Dept: PHARMACY | Facility: CLINIC | Age: 77
End: 2022-02-24
Payer: MEDICARE

## 2022-02-24 DIAGNOSIS — C18.1 CARCINOMA OF APPENDIX: Primary | ICD-10-CM

## 2022-02-24 NOTE — TELEPHONE ENCOUNTER
Specialty Pharmacy - Refill Coordination    Specialty Medication Orders Linked to Encounter    Flowsheet Row Most Recent Value   Medication #1 capecitabine (XELODA) 500 MG Tab (Order#[id number], Rx#[id number])          Refill Questions - Documented Responses    Flowsheet Row Most Recent Value   Patient Availability and HIPAA Verification    Does patient want to proceed with activity? Yes   HIPAA/medical authority confirmed? Yes   Relationship to patient of person spoken to? Self   Refill Screening Questions    Changes to allergies? No   Changes to medications? Yes  [off of tamsulosin and finasteride]   New conditions since last clinic visit? No   Unplanned office visit, urgent care, ED, or hospital admission in the last 4 weeks? No   How does patient/caregiver feel medication is working? Very good   Financial problems or insurance changes? No   How many doses of your specialty medications were missed in the last 4 weeks? 0   Would patient like to speak to a pharmacist? Yes   When does the patient need to receive the medication? 03/07/22   Refill Delivery Questions    How will the patient receive the medication? Delivery Suki   When does the patient need to receive the medication? 03/07/22   Shipping Address Home   Address in [facility] confirmed and updated if neccessary? Yes   Expected Copay ($) 18.96   Is the patient able to afford the medication copay? Yes   Payment Method zero copay   Days supply of Refill 21   Supplies needed? No supplies needed   Refill activity completed? Yes   Refill activity plan Refill scheduled   Shipment/Pickup Date: 03/02/22          Current Outpatient Medications   Medication Sig    albuterol (PROVENTIL/VENTOLIN HFA) 90 mcg/actuation inhaler Inhale 2 puffs into the lungs every 6 (six) hours as needed for Wheezing or Shortness of Breath. Rescue    amLODIPine (NORVASC) 10 MG tablet Take 1 tablet (10 mg total) by mouth every evening.    aspirin (ECOTRIN) 81 MG EC tablet Take 1  tablet (81 mg total) by mouth every morning. Start on Sunday 6/28    capecitabine (XELODA) 500 MG Tab Takes 3 tablets by mouth 2 times daily for 2 weeks on 1 week off..    carvedilol (COREG) 12.5 MG tablet 12.5 mg 2 (two) times daily.     CHOLECALCIFEROL, VITAMIN D3, ORAL Take 2,000 Units by mouth every morning.     clopidogreL (PLAVIX) 75 mg tablet Take 1 tablet (75 mg total) by mouth every morning. Start on Sunday 6/28    fish oil-omega-3 fatty acids 300-1,000 mg capsule Take 1 capsule by mouth 2 (two) times daily.     fluticasone (FLONASE) 50 mcg/actuation nasal spray 2 sprays by Each Nostril route daily as needed for Rhinitis.     HYDROcodone-acetaminophen (NORCO) 5-325 mg per tablet Take 1 tablet by mouth every 6 (six) hours as needed for Pain.    lovastatin (MEVACOR) 40 MG tablet Take 40 mg by mouth nightly.     MICARDIS 40 mg Tab Take 40 mg by mouth every morning.     NITROSTAT 0.4 mg SL tablet Place 0.4 mg under the tongue every 5 (five) minutes as needed for Chest pain.    Last reviewed on 2/24/2022  1:38 PM by Anika Daigle, PharmD    Review of patient's allergies indicates:   Allergen Reactions    Ciprofloxacin      vomiting    Rapaflo [silodosin]      Urinating increased, kidney pain    Simvastatin Other (See Comments)     Severe muscle pain    Last reviewed on  2/24/2022 1:38 PM by Anika Daigle      Tasks added this encounter   No tasks added.   Tasks due within next 3 months   2/24/2022 - Refill Call (Auto Added)  5/1/2022 - Clinical - Follow Up Assesement (90 day)     Anika Daigle, PharmD  [facility]  [address] [place] [id number]  Phone: [phone]  Fax: [phone]

## 2022-02-25 ENCOUNTER — LAB VISIT (OUTPATIENT)
Dept: LAB | Facility: HOSPITAL | Age: 77
End: 2022-02-25
Attending: INTERNAL MEDICINE
Payer: MEDICARE

## 2022-02-25 DIAGNOSIS — E80.4 GILBERT'S SYNDROME: ICD-10-CM

## 2022-02-25 DIAGNOSIS — C18.1 CARCINOMA OF APPENDIX: ICD-10-CM

## 2022-02-25 DIAGNOSIS — Z01.818 EXAMINATION PRIOR TO CHEMOTHERAPY: ICD-10-CM

## 2022-02-25 LAB
ALBUMIN SERPL BCP-MCNC: 3.8 G/DL (ref 3.5–5.2)
ALP SERPL-CCNC: 66 U/L (ref 55–135)
ALT SERPL W/O P-5'-P-CCNC: 18 U/L (ref 10–44)
ANION GAP SERPL CALC-SCNC: 10 MMOL/L (ref 8–16)
AST SERPL-CCNC: 18 U/L (ref 10–40)
BASOPHILS # BLD AUTO: 0.04 K/UL (ref 0–0.2)
BASOPHILS NFR BLD: 0.8 % (ref 0–1.9)
BILIRUB SERPL-MCNC: 3.1 MG/DL (ref 0.1–1)
BUN SERPL-MCNC: 17 MG/DL (ref 8–23)
CALCIUM SERPL-MCNC: 8.6 MG/DL (ref 8.7–10.5)
CHLORIDE SERPL-SCNC: 105 MMOL/L (ref 95–110)
CO2 SERPL-SCNC: 25 MMOL/L (ref 23–29)
CREAT SERPL-MCNC: 1 MG/DL (ref 0.5–1.4)
DIFFERENTIAL METHOD: ABNORMAL
EOSINOPHIL # BLD AUTO: 0.1 K/UL (ref 0–0.5)
EOSINOPHIL NFR BLD: 1 % (ref 0–8)
ERYTHROCYTE [DISTWIDTH] IN BLOOD BY AUTOMATED COUNT: 15.1 % (ref 11.5–14.5)
EST. GFR  (AFRICAN AMERICAN): >60 ML/MIN/1.73 M^2
EST. GFR  (NON AFRICAN AMERICAN): >60 ML/MIN/1.73 M^2
GLUCOSE SERPL-MCNC: 126 MG/DL (ref 70–110)
HCT VFR BLD AUTO: 37.2 % (ref 40–54)
HGB BLD-MCNC: 13 G/DL (ref 14–18)
IMM GRANULOCYTES # BLD AUTO: 0.01 K/UL (ref 0–0.04)
IMM GRANULOCYTES NFR BLD AUTO: 0.2 % (ref 0–0.5)
LYMPHOCYTES # BLD AUTO: 1.7 K/UL (ref 1–4.8)
LYMPHOCYTES NFR BLD: 34.1 % (ref 18–48)
MAGNESIUM SERPL-MCNC: 2 MG/DL (ref 1.6–2.6)
MCH RBC QN AUTO: 36.1 PG (ref 27–31)
MCHC RBC AUTO-ENTMCNC: 34.9 G/DL (ref 32–36)
MCV RBC AUTO: 103 FL (ref 82–98)
MONOCYTES # BLD AUTO: 0.6 K/UL (ref 0.3–1)
MONOCYTES NFR BLD: 11.2 % (ref 4–15)
NEUTROPHILS # BLD AUTO: 2.6 K/UL (ref 1.8–7.7)
NEUTROPHILS NFR BLD: 52.7 % (ref 38–73)
NRBC BLD-RTO: 0 /100 WBC
PLATELET # BLD AUTO: 159 K/UL (ref 150–450)
PMV BLD AUTO: 10 FL (ref 9.2–12.9)
POTASSIUM SERPL-SCNC: 3.9 MMOL/L (ref 3.5–5.1)
PROT SERPL-MCNC: 6.2 G/DL (ref 6–8.4)
RBC # BLD AUTO: 3.6 M/UL (ref 4.6–6.2)
SODIUM SERPL-SCNC: 140 MMOL/L (ref 136–145)
WBC # BLD AUTO: 4.9 K/UL (ref 3.9–12.7)

## 2022-02-25 PROCEDURE — 83735 ASSAY OF MAGNESIUM: CPT | Performed by: INTERNAL MEDICINE

## 2022-02-25 PROCEDURE — 85025 COMPLETE CBC W/AUTO DIFF WBC: CPT | Performed by: INTERNAL MEDICINE

## 2022-02-25 PROCEDURE — 80053 COMPREHEN METABOLIC PANEL: CPT | Performed by: INTERNAL MEDICINE

## 2022-02-25 PROCEDURE — 36415 COLL VENOUS BLD VENIPUNCTURE: CPT | Performed by: INTERNAL MEDICINE

## 2022-02-28 ENCOUNTER — OFFICE VISIT (OUTPATIENT)
Dept: HEMATOLOGY/ONCOLOGY | Facility: CLINIC | Age: 77
End: 2022-02-28
Payer: MEDICARE

## 2022-02-28 ENCOUNTER — TELEPHONE (OUTPATIENT)
Dept: HEMATOLOGY/ONCOLOGY | Facility: CLINIC | Age: 77
End: 2022-02-28

## 2022-02-28 VITALS
RESPIRATION RATE: 16 BRPM | HEART RATE: 60 BPM | TEMPERATURE: 97 F | OXYGEN SATURATION: 99 % | WEIGHT: 221.81 LBS | SYSTOLIC BLOOD PRESSURE: 157 MMHG | DIASTOLIC BLOOD PRESSURE: 79 MMHG | BODY MASS INDEX: 35.8 KG/M2

## 2022-02-28 DIAGNOSIS — C18.1 CARCINOMA OF APPENDIX: Primary | ICD-10-CM

## 2022-02-28 DIAGNOSIS — E80.4 GILBERT'S SYNDROME: ICD-10-CM

## 2022-02-28 DIAGNOSIS — Z01.818 EXAMINATION PRIOR TO CHEMOTHERAPY: ICD-10-CM

## 2022-02-28 PROCEDURE — 99999 PR PBB SHADOW E&M-EST. PATIENT-LVL III: ICD-10-PCS | Mod: PBBFAC,,, | Performed by: INTERNAL MEDICINE

## 2022-02-28 PROCEDURE — 99215 PR OFFICE/OUTPT VISIT, EST, LEVL V, 40-54 MIN: ICD-10-PCS | Mod: S$PBB,,, | Performed by: INTERNAL MEDICINE

## 2022-02-28 PROCEDURE — 99215 OFFICE O/P EST HI 40 MIN: CPT | Mod: S$PBB,,, | Performed by: INTERNAL MEDICINE

## 2022-02-28 PROCEDURE — 99999 PR PBB SHADOW E&M-EST. PATIENT-LVL III: CPT | Mod: PBBFAC,,, | Performed by: INTERNAL MEDICINE

## 2022-02-28 PROCEDURE — 99213 OFFICE O/P EST LOW 20 MIN: CPT | Mod: PBBFAC,PO | Performed by: INTERNAL MEDICINE

## 2022-02-28 NOTE — TELEPHONE ENCOUNTER
Scheduled patient for follow-up and lab appointment per Dr. Cartagena's orders below. Linked labs to appointment. Reviewed AVS with patient. Gave patient thank you/survey sheet. The patient has no questions at this time.         ----- Message from Shaun Cartagena MD sent at 2/28/2022  9:57 AM CST -----  RTC 3 weeks with labs

## 2022-02-28 NOTE — PROGRESS NOTES
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  History of benign prostate hypertrophy with obstructive urine output.  Status post TURP by Dr Craig Reza on 21    History of prostate cancer.  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  Associated with acute appendicitis with a rupture.  Five benign lymph nodes retrieved.    Tumor size 0.8 cm.  Histological type adenocarcinoma.  Histologic  Grade moderately    Differentiated.  Tumor invades muscular peripheral.  Margin negative.  Tumor deposit negative.  Lymphovascular invasion negative.  Perineural invasion    Negative.  Pathological staging [id number]    On keep side being 1500mg b.i.d. daily 2 weeks on 1 week off    Past Medical History:   Diagnosis Date    BPH with obstruction/lower urinary tract symptoms     Cancer     prostate    Carcinoma of appendix     Coronary artery disease     Elevated PSA     Gilbert's disease 2021    Hyperlipidemia     Hypertension     MI (myocardial infarction)     Primary appendiceal adenocarcinoma     Stented coronary artery 2005    RCA     Social History     Socioeconomic History    Marital status:    Tobacco Use    Smoking status: Former Smoker     Quit date: 1991     Years since quittin.7    Smokeless tobacco: Never Used    Tobacco comment: cigars and pipe   Substance and Sexual Activity    Alcohol use: Not Currently    Drug use: Not Currently         Objective    Physical Exam     Vitals:    22 0919   BP: (!) 157/79   Pulse: 60   Resp: 16   Temp: 97.3 °F (36.3 °C)     Awake alert no acute distress  Normocephalic atraumatic pupils equal round reactive  Normal respiratory effort  Normal rate  Distal pulses intact  Peripheral edema  Grossly cranial nerves 2-12 grossly intact sensorimotor grossly intact  Patient ambulatory on his own power        CMP  Sodium    Date Value Ref Range Status   02/25/2022 140 136 - 145 mmol/L Final     Potassium   Date Value Ref Range Status   02/25/2022 3.9 3.5 - 5.1 mmol/L Final     Chloride   Date Value Ref Range Status   02/25/2022 105 95 - 110 mmol/L Final     CO2   Date Value Ref Range Status   02/25/2022 25 23 - 29 mmol/L Final     Glucose   Date Value Ref Range Status   02/25/2022 126 (H) 70 - 110 mg/dL Final     BUN   Date Value Ref Range Status   02/25/2022 17 8 - 23 mg/dL Final     Creatinine   Date Value Ref Range Status   02/25/2022 1.0 0.5 - 1.4 mg/dL Final     Calcium   Date Value Ref Range Status   02/25/2022 8.6 (L) 8.7 - 10.5 mg/dL Final     Total Protein   Date Value Ref Range Status   02/25/2022 6.2 6.0 - 8.4 g/dL Final     Albumin   Date Value Ref Range Status   02/25/2022 3.8 3.5 - 5.2 g/dL Final     Total Bilirubin   Date Value Ref Range Status   02/25/2022 3.1 (H) 0.1 - 1.0 mg/dL Final     Comment:     For infants and newborns, interpretation of results should be based  on gestational age, weight and in agreement with clinical  observations.    Premature Infant recommended reference ranges:  Up to 24 hours.............<8.0 mg/dL  Up to 48 hours............<12.0 mg/dL  3-5 days..................<15.0 mg/dL  6-29 days.................<15.0 mg/dL       Alkaline Phosphatase   Date Value Ref Range Status   02/25/2022 66 55 - 135 U/L Final     AST   Date Value Ref Range Status   02/25/2022 18 10 - 40 U/L Final     ALT   Date Value Ref Range Status   02/25/2022 18 10 - 44 U/L Final     Anion Gap   Date Value Ref Range Status   02/25/2022 10 8 - 16 mmol/L Final     eGFR if    Date Value Ref Range Status   02/25/2022 >60 >60 mL/min/1.73 m^2 Final     eGFR if non    Date Value Ref Range Status   02/25/2022 >60 >60 mL/min/1.73 m^2 Final     Comment:     Calculation used to obtain the estimated glomerular filtration  rate (eGFR) is the CKD-EPI equation.        Lab Results   Component Value Date     WBC 4.90 02/25/2022    HGB 13.0 (L) 02/25/2022    HCT 37.2 (L) 02/25/2022     (H) 02/25/2022     02/25/2022 05/06/2021 surgical specimen  FINAL DIAGNOSIS:   05/10/2021 JAR/jr     ILEUM AND CECUM, ILEOCECETOMY   --APPENDIX WITH INVASIVE MODERATELY DIFFERENTIATED ADENOCARCINOMA.   --ASSOCIATED WITH ACUTE APPENDICITIS WITH RUPTURE.   --FIVE BENIGN LYMPH NODES.   --BACKGROUND COLON AND SMALL INTESTINE WITH SEROSAL ADHESIONS.   --SEE TUMOR SYNOPTIC REPORT.     Comment:   Additional dissection revealed one additional benign lymph node.     Assessment Plan    appendiceal adenocarcinoma.     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    I have discussed the case with Dr. Sundar Garcia.      With evidence of a rupture consider microscopic tumor deposit as a high risk factor.    Patient is 76 years old with comorbidities such as a previous diagnosis of MI as well as recent  TURP    Gilbert syndrome    > rupture high risk factor will have to consider microscopic tumor deposit.  However given patient's age and comorbidity it is unlikely that he will tolerate any aggressive measure.  I have spoken to surgical team, reported patient cannot tolerate additional surgery.    > capecitabine monotherapy 1000mg/m2 b.i.d. (from 1250mg/m2 due to underlying liver condition, age  and comorbidities) daily 2 weeks on 1 week off     > gallbladder infection, elevated transaminitis, cholecystectomy on 10/11/2021.  Patient resume treatments.   I will see him on 12/26/21 with labs     > RTC 3 weeks with labs

## 2022-03-18 ENCOUNTER — LAB VISIT (OUTPATIENT)
Dept: LAB | Facility: HOSPITAL | Age: 77
End: 2022-03-18
Attending: INTERNAL MEDICINE
Payer: MEDICARE

## 2022-03-18 DIAGNOSIS — E80.4 GILBERT'S SYNDROME: ICD-10-CM

## 2022-03-18 DIAGNOSIS — Z01.818 EXAMINATION PRIOR TO CHEMOTHERAPY: ICD-10-CM

## 2022-03-18 DIAGNOSIS — C18.1 CARCINOMA OF APPENDIX: ICD-10-CM

## 2022-03-18 LAB
ALBUMIN SERPL BCP-MCNC: 4 G/DL (ref 3.5–5.2)
ALP SERPL-CCNC: 70 U/L (ref 55–135)
ALT SERPL W/O P-5'-P-CCNC: 15 U/L (ref 10–44)
ANION GAP SERPL CALC-SCNC: 8 MMOL/L (ref 8–16)
AST SERPL-CCNC: 17 U/L (ref 10–40)
BASOPHILS # BLD AUTO: 0.08 K/UL (ref 0–0.2)
BASOPHILS NFR BLD: 1.3 % (ref 0–1.9)
BILIRUB SERPL-MCNC: 2.3 MG/DL (ref 0.1–1)
BUN SERPL-MCNC: 15 MG/DL (ref 8–23)
CALCIUM SERPL-MCNC: 9 MG/DL (ref 8.7–10.5)
CHLORIDE SERPL-SCNC: 104 MMOL/L (ref 95–110)
CO2 SERPL-SCNC: 26 MMOL/L (ref 23–29)
CREAT SERPL-MCNC: 1 MG/DL (ref 0.5–1.4)
DIFFERENTIAL METHOD: ABNORMAL
EOSINOPHIL # BLD AUTO: 0.1 K/UL (ref 0–0.5)
EOSINOPHIL NFR BLD: 1.3 % (ref 0–8)
ERYTHROCYTE [DISTWIDTH] IN BLOOD BY AUTOMATED COUNT: 14.9 % (ref 11.5–14.5)
EST. GFR  (AFRICAN AMERICAN): >60 ML/MIN/1.73 M^2
EST. GFR  (NON AFRICAN AMERICAN): >60 ML/MIN/1.73 M^2
GLUCOSE SERPL-MCNC: 102 MG/DL (ref 70–110)
HCT VFR BLD AUTO: 38.8 % (ref 40–54)
HGB BLD-MCNC: 13.4 G/DL (ref 14–18)
IMM GRANULOCYTES # BLD AUTO: 0.01 K/UL (ref 0–0.04)
IMM GRANULOCYTES NFR BLD AUTO: 0.2 % (ref 0–0.5)
LYMPHOCYTES # BLD AUTO: 2.1 K/UL (ref 1–4.8)
LYMPHOCYTES NFR BLD: 34.8 % (ref 18–48)
MAGNESIUM SERPL-MCNC: 2.2 MG/DL (ref 1.6–2.6)
MCH RBC QN AUTO: 35.8 PG (ref 27–31)
MCHC RBC AUTO-ENTMCNC: 34.5 G/DL (ref 32–36)
MCV RBC AUTO: 104 FL (ref 82–98)
MONOCYTES # BLD AUTO: 0.6 K/UL (ref 0.3–1)
MONOCYTES NFR BLD: 9.8 % (ref 4–15)
NEUTROPHILS # BLD AUTO: 3.2 K/UL (ref 1.8–7.7)
NEUTROPHILS NFR BLD: 52.6 % (ref 38–73)
NRBC BLD-RTO: 0 /100 WBC
PLATELET # BLD AUTO: 201 K/UL (ref 150–450)
PMV BLD AUTO: 9.5 FL (ref 9.2–12.9)
POTASSIUM SERPL-SCNC: 4.4 MMOL/L (ref 3.5–5.1)
PROT SERPL-MCNC: 7 G/DL (ref 6–8.4)
RBC # BLD AUTO: 3.74 M/UL (ref 4.6–6.2)
SODIUM SERPL-SCNC: 138 MMOL/L (ref 136–145)
WBC # BLD AUTO: 6.04 K/UL (ref 3.9–12.7)

## 2022-03-18 PROCEDURE — 80053 COMPREHEN METABOLIC PANEL: CPT | Performed by: INTERNAL MEDICINE

## 2022-03-18 PROCEDURE — 83735 ASSAY OF MAGNESIUM: CPT | Performed by: INTERNAL MEDICINE

## 2022-03-18 PROCEDURE — 85025 COMPLETE CBC W/AUTO DIFF WBC: CPT | Performed by: INTERNAL MEDICINE

## 2022-03-18 PROCEDURE — 36415 COLL VENOUS BLD VENIPUNCTURE: CPT | Performed by: INTERNAL MEDICINE

## 2022-03-21 ENCOUNTER — TELEPHONE (OUTPATIENT)
Dept: HEMATOLOGY/ONCOLOGY | Facility: CLINIC | Age: 77
End: 2022-03-21

## 2022-03-21 ENCOUNTER — OFFICE VISIT (OUTPATIENT)
Dept: HEMATOLOGY/ONCOLOGY | Facility: CLINIC | Age: 77
End: 2022-03-21
Payer: MEDICARE

## 2022-03-21 ENCOUNTER — SPECIALTY PHARMACY (OUTPATIENT)
Dept: PHARMACY | Facility: CLINIC | Age: 77
End: 2022-03-21
Payer: MEDICARE

## 2022-03-21 VITALS
SYSTOLIC BLOOD PRESSURE: 153 MMHG | BODY MASS INDEX: 35.57 KG/M2 | WEIGHT: 221.31 LBS | DIASTOLIC BLOOD PRESSURE: 74 MMHG | HEART RATE: 59 BPM | HEIGHT: 66 IN | OXYGEN SATURATION: 99 % | TEMPERATURE: 98 F | RESPIRATION RATE: 16 BRPM

## 2022-03-21 DIAGNOSIS — R74.01 ELEVATED TRANSAMINASE LEVEL: ICD-10-CM

## 2022-03-21 DIAGNOSIS — Z01.818 EXAMINATION PRIOR TO CHEMOTHERAPY: ICD-10-CM

## 2022-03-21 DIAGNOSIS — E80.4 GILBERT'S SYNDROME: ICD-10-CM

## 2022-03-21 DIAGNOSIS — C18.1 CARCINOMA OF APPENDIX: Primary | ICD-10-CM

## 2022-03-21 PROCEDURE — 99215 OFFICE O/P EST HI 40 MIN: CPT | Mod: S$PBB,,, | Performed by: INTERNAL MEDICINE

## 2022-03-21 PROCEDURE — 99999 PR PBB SHADOW E&M-EST. PATIENT-LVL IV: ICD-10-PCS | Mod: PBBFAC,,, | Performed by: INTERNAL MEDICINE

## 2022-03-21 PROCEDURE — 99215 PR OFFICE/OUTPT VISIT, EST, LEVL V, 40-54 MIN: ICD-10-PCS | Mod: S$PBB,,, | Performed by: INTERNAL MEDICINE

## 2022-03-21 PROCEDURE — 99214 OFFICE O/P EST MOD 30 MIN: CPT | Mod: PBBFAC,PO | Performed by: INTERNAL MEDICINE

## 2022-03-21 PROCEDURE — 99999 PR PBB SHADOW E&M-EST. PATIENT-LVL IV: CPT | Mod: PBBFAC,,, | Performed by: INTERNAL MEDICINE

## 2022-03-21 NOTE — TELEPHONE ENCOUNTER
Incoming call from patient requesting to speak with Anika Daigle Rph. Patient declined speaking with anyone else, and requested a call back once she is available. Routing and alerting [facility].

## 2022-03-21 NOTE — TELEPHONE ENCOUNTER
Scheduled patient for follow-up and lab appointment per Dr. Cartagena's orders below. Linked labs to appointment. Reviewed AVS with patient. Gave patient thank you/survey sheet and advanced care planning pamphlet. The patient has no questions at this time.         ----- Message from Shaun Cartagena MD sent at 3/21/2022  9:19 AM CDT -----  RTC 3 weeks with labs

## 2022-03-21 NOTE — PROGRESS NOTES
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  History of benign prostate hypertrophy with obstructive urine output.  Status post TURP by Dr Craig Reza on 21    History of prostate cancer.  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  Associated with acute appendicitis with a rupture.  Five benign lymph nodes retrieved.    Tumor size 0.8 cm.  Histological type adenocarcinoma.  Histologic  Grade moderately    Differentiated.  Tumor invades muscular peripheral.  Margin negative.  Tumor deposit negative.  Lymphovascular invasion negative.  Perineural invasion    Negative.  Pathological staging [id number]    On keep side being 1500mg b.i.d. daily 2 weeks on 1 week off    Past Medical History:   Diagnosis Date    BPH with obstruction/lower urinary tract symptoms     Cancer     prostate    Carcinoma of appendix     Coronary artery disease     Elevated PSA     Gilbert's disease 2021    Hyperlipidemia     Hypertension     MI (myocardial infarction)     Primary appendiceal adenocarcinoma     Stented coronary artery 2005    RCA     Social History     Socioeconomic History    Marital status:    Tobacco Use    Smoking status: Former Smoker     Quit date: 1991     Years since quittin.7    Smokeless tobacco: Never Used    Tobacco comment: cigars and pipe   Substance and Sexual Activity    Alcohol use: Not Currently    Drug use: Not Currently         Objective    Physical Exam     Vitals:    22 0907   BP: (!) 153/74   Pulse: (!) 59   Resp: 16   Temp: 97.7 °F (36.5 °C)     Awake alert no acute distress  Normocephalic atraumatic pupils equal round reactive  Normal respiratory effort  Normal rate  Distal pulses intact  Peripheral edema  Grossly cranial nerves 2-12 grossly intact sensorimotor grossly intact  Patient ambulatory on his own power        CMP  Sodium    Date Value Ref Range Status   03/18/2022 138 136 - 145 mmol/L Final     Potassium   Date Value Ref Range Status   03/18/2022 4.4 3.5 - 5.1 mmol/L Final     Chloride   Date Value Ref Range Status   03/18/2022 104 95 - 110 mmol/L Final     CO2   Date Value Ref Range Status   03/18/2022 26 23 - 29 mmol/L Final     Glucose   Date Value Ref Range Status   03/18/2022 102 70 - 110 mg/dL Final     BUN   Date Value Ref Range Status   03/18/2022 15 8 - 23 mg/dL Final     Creatinine   Date Value Ref Range Status   03/18/2022 1.0 0.5 - 1.4 mg/dL Final     Calcium   Date Value Ref Range Status   03/18/2022 9.0 8.7 - 10.5 mg/dL Final     Total Protein   Date Value Ref Range Status   03/18/2022 7.0 6.0 - 8.4 g/dL Final     Albumin   Date Value Ref Range Status   03/18/2022 4.0 3.5 - 5.2 g/dL Final     Total Bilirubin   Date Value Ref Range Status   03/18/2022 2.3 (H) 0.1 - 1.0 mg/dL Final     Comment:     For infants and newborns, interpretation of results should be based  on gestational age, weight and in agreement with clinical  observations.    Premature Infant recommended reference ranges:  Up to 24 hours.............<8.0 mg/dL  Up to 48 hours............<12.0 mg/dL  3-5 days..................<15.0 mg/dL  6-29 days.................<15.0 mg/dL       Alkaline Phosphatase   Date Value Ref Range Status   03/18/2022 70 55 - 135 U/L Final     AST   Date Value Ref Range Status   03/18/2022 17 10 - 40 U/L Final     ALT   Date Value Ref Range Status   03/18/2022 15 10 - 44 U/L Final     Anion Gap   Date Value Ref Range Status   03/18/2022 8 8 - 16 mmol/L Final     eGFR if    Date Value Ref Range Status   03/18/2022 >60 >60 mL/min/1.73 m^2 Final     eGFR if non    Date Value Ref Range Status   03/18/2022 >60 >60 mL/min/1.73 m^2 Final     Comment:     Calculation used to obtain the estimated glomerular filtration  rate (eGFR) is the CKD-EPI equation.        Lab Results   Component Value Date    WBC 6.04  03/18/2022    HGB 13.4 (L) 03/18/2022    HCT 38.8 (L) 03/18/2022     (H) 03/18/2022     03/18/2022 05/06/2021 surgical specimen  FINAL DIAGNOSIS:   05/10/2021 JAR/jr     ILEUM AND CECUM, ILEOCECETOMY   --APPENDIX WITH INVASIVE MODERATELY DIFFERENTIATED ADENOCARCINOMA.   --ASSOCIATED WITH ACUTE APPENDICITIS WITH RUPTURE.   --FIVE BENIGN LYMPH NODES.   --BACKGROUND COLON AND SMALL INTESTINE WITH SEROSAL ADHESIONS.   --SEE TUMOR SYNOPTIC REPORT.     Comment:   Additional dissection revealed one additional benign lymph node.     Assessment Plan    appendiceal adenocarcinoma.     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    I have discussed the case with Dr. Sundar Garcia.      With evidence of a rupture consider microscopic tumor deposit as a high risk factor.    Patient is 77 years old with comorbidities such as a previous diagnosis of MI as well as recent  TURP    Gilbert syndrome    > rupture high risk factor will have to consider microscopic tumor deposit.  However given patient's age and comorbidity it is unlikely that he will tolerate any aggressive measure.  I have spoken to surgical team, reported patient cannot tolerate additional surgery.    > capecitabine monotherapy 1000mg/m2 b.i.d. (from 1250mg/m2 due to underlying liver condition, age and  comorbidities) daily 2 weeks on 1 week off     > gallbladder infection, elevated transaminitis, cholecystectomy on 10/11/2021.      > RTC 3 weeks with labs

## 2022-03-23 NOTE — TELEPHONE ENCOUNTER
Was able to reach Mr. Stewart on 3/21 and he stated that he has completed his chemotherapy and is no longer in need of his Xeloda therapy. Messaged Dr. Cartagena's office to confirm and will close out at [facility] at this time. Pt confirmed that he did not have any remaining tablets on hand and therefore did not need further disposal information.

## 2022-04-08 ENCOUNTER — LAB VISIT (OUTPATIENT)
Dept: LAB | Facility: HOSPITAL | Age: 77
End: 2022-04-08
Attending: INTERNAL MEDICINE
Payer: MEDICARE

## 2022-04-08 DIAGNOSIS — C18.1 CARCINOMA OF APPENDIX: ICD-10-CM

## 2022-04-08 DIAGNOSIS — Z01.818 EXAMINATION PRIOR TO CHEMOTHERAPY: ICD-10-CM

## 2022-04-08 DIAGNOSIS — R74.01 ELEVATED TRANSAMINASE LEVEL: ICD-10-CM

## 2022-04-08 DIAGNOSIS — E80.4 GILBERT'S SYNDROME: ICD-10-CM

## 2022-04-08 LAB
ALBUMIN SERPL BCP-MCNC: 3.7 G/DL (ref 3.5–5.2)
ALP SERPL-CCNC: 63 U/L (ref 55–135)
ALT SERPL W/O P-5'-P-CCNC: 21 U/L (ref 10–44)
ANION GAP SERPL CALC-SCNC: 8 MMOL/L (ref 8–16)
AST SERPL-CCNC: 20 U/L (ref 10–40)
BASOPHILS # BLD AUTO: 0.06 K/UL (ref 0–0.2)
BASOPHILS NFR BLD: 1.1 % (ref 0–1.9)
BILIRUB SERPL-MCNC: 2.4 MG/DL (ref 0.1–1)
BUN SERPL-MCNC: 13 MG/DL (ref 8–23)
CALCIUM SERPL-MCNC: 8.8 MG/DL (ref 8.7–10.5)
CHLORIDE SERPL-SCNC: 106 MMOL/L (ref 95–110)
CO2 SERPL-SCNC: 28 MMOL/L (ref 23–29)
CREAT SERPL-MCNC: 1 MG/DL (ref 0.5–1.4)
DIFFERENTIAL METHOD: ABNORMAL
EOSINOPHIL # BLD AUTO: 0.2 K/UL (ref 0–0.5)
EOSINOPHIL NFR BLD: 2.9 % (ref 0–8)
ERYTHROCYTE [DISTWIDTH] IN BLOOD BY AUTOMATED COUNT: 14.2 % (ref 11.5–14.5)
EST. GFR  (AFRICAN AMERICAN): >60 ML/MIN/1.73 M^2
EST. GFR  (NON AFRICAN AMERICAN): >60 ML/MIN/1.73 M^2
GLUCOSE SERPL-MCNC: 107 MG/DL (ref 70–110)
HCT VFR BLD AUTO: 38.4 % (ref 40–54)
HGB BLD-MCNC: 13 G/DL (ref 14–18)
IMM GRANULOCYTES # BLD AUTO: 0.01 K/UL (ref 0–0.04)
IMM GRANULOCYTES NFR BLD AUTO: 0.2 % (ref 0–0.5)
LYMPHOCYTES # BLD AUTO: 1.8 K/UL (ref 1–4.8)
LYMPHOCYTES NFR BLD: 33 % (ref 18–48)
MAGNESIUM SERPL-MCNC: 1.9 MG/DL (ref 1.6–2.6)
MCH RBC QN AUTO: 35.4 PG (ref 27–31)
MCHC RBC AUTO-ENTMCNC: 33.9 G/DL (ref 32–36)
MCV RBC AUTO: 105 FL (ref 82–98)
MONOCYTES # BLD AUTO: 0.5 K/UL (ref 0.3–1)
MONOCYTES NFR BLD: 9.7 % (ref 4–15)
NEUTROPHILS # BLD AUTO: 2.9 K/UL (ref 1.8–7.7)
NEUTROPHILS NFR BLD: 53.1 % (ref 38–73)
NRBC BLD-RTO: 0 /100 WBC
PLATELET # BLD AUTO: 144 K/UL (ref 150–450)
PMV BLD AUTO: 9.9 FL (ref 9.2–12.9)
POTASSIUM SERPL-SCNC: 4.3 MMOL/L (ref 3.5–5.1)
PROT SERPL-MCNC: 6.3 G/DL (ref 6–8.4)
RBC # BLD AUTO: 3.67 M/UL (ref 4.6–6.2)
SODIUM SERPL-SCNC: 142 MMOL/L (ref 136–145)
WBC # BLD AUTO: 5.46 K/UL (ref 3.9–12.7)

## 2022-04-08 PROCEDURE — 83735 ASSAY OF MAGNESIUM: CPT | Performed by: INTERNAL MEDICINE

## 2022-04-08 PROCEDURE — 85025 COMPLETE CBC W/AUTO DIFF WBC: CPT | Performed by: INTERNAL MEDICINE

## 2022-04-08 PROCEDURE — 80053 COMPREHEN METABOLIC PANEL: CPT | Performed by: INTERNAL MEDICINE

## 2022-04-08 PROCEDURE — 36415 COLL VENOUS BLD VENIPUNCTURE: CPT | Performed by: INTERNAL MEDICINE

## 2022-04-11 ENCOUNTER — OFFICE VISIT (OUTPATIENT)
Dept: HEMATOLOGY/ONCOLOGY | Facility: CLINIC | Age: 77
End: 2022-04-11
Payer: MEDICARE

## 2022-04-11 ENCOUNTER — TELEPHONE (OUTPATIENT)
Dept: HEMATOLOGY/ONCOLOGY | Facility: CLINIC | Age: 77
End: 2022-04-11

## 2022-04-11 VITALS
DIASTOLIC BLOOD PRESSURE: 73 MMHG | HEIGHT: 66 IN | SYSTOLIC BLOOD PRESSURE: 137 MMHG | WEIGHT: 221.31 LBS | BODY MASS INDEX: 35.57 KG/M2 | OXYGEN SATURATION: 98 % | TEMPERATURE: 98 F | HEART RATE: 64 BPM | RESPIRATION RATE: 16 BRPM

## 2022-04-11 DIAGNOSIS — R74.01 ELEVATED TRANSAMINASE LEVEL: ICD-10-CM

## 2022-04-11 DIAGNOSIS — D52.0 DIETARY FOLATE DEFICIENCY ANEMIA: ICD-10-CM

## 2022-04-11 DIAGNOSIS — E80.4 GILBERT'S SYNDROME: ICD-10-CM

## 2022-04-11 DIAGNOSIS — C18.1 CARCINOMA OF APPENDIX: ICD-10-CM

## 2022-04-11 DIAGNOSIS — D64.9 ANEMIA, UNSPECIFIED TYPE: ICD-10-CM

## 2022-04-11 DIAGNOSIS — D51.3 OTHER DIETARY VITAMIN B12 DEFICIENCY ANEMIA: ICD-10-CM

## 2022-04-11 DIAGNOSIS — D75.89 MACROCYTOSIS: Primary | ICD-10-CM

## 2022-04-11 PROCEDURE — 99214 OFFICE O/P EST MOD 30 MIN: CPT | Mod: PBBFAC,PO | Performed by: INTERNAL MEDICINE

## 2022-04-11 PROCEDURE — 99999 PR PBB SHADOW E&M-EST. PATIENT-LVL IV: ICD-10-PCS | Mod: PBBFAC,,, | Performed by: INTERNAL MEDICINE

## 2022-04-11 PROCEDURE — 99999 PR PBB SHADOW E&M-EST. PATIENT-LVL IV: CPT | Mod: PBBFAC,,, | Performed by: INTERNAL MEDICINE

## 2022-04-11 PROCEDURE — 99215 OFFICE O/P EST HI 40 MIN: CPT | Mod: S$PBB,,, | Performed by: INTERNAL MEDICINE

## 2022-04-11 PROCEDURE — 99215 PR OFFICE/OUTPT VISIT, EST, LEVL V, 40-54 MIN: ICD-10-PCS | Mod: S$PBB,,, | Performed by: INTERNAL MEDICINE

## 2022-04-11 NOTE — PROGRESS NOTES
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  History of benign prostate hypertrophy with obstructive urine output.  Status post TURP by Dr Craig Reza on 21    History of prostate cancer.  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  Associated with acute appendicitis with a rupture.  Five benign lymph nodes retrieved.    Tumor size 0.8 cm.  Histological type adenocarcinoma.  Histologic  Grade moderately    Differentiated.  Tumor invades muscular peripheral.  Margin negative.  Tumor deposit negative.  Lymphovascular invasion negative.  Perineural invasion    Negative.  Pathological staging [id number]    On keep side being 1500mg b.i.d. daily 2 weeks on 1 week off    Past Medical History:   Diagnosis Date    BPH with obstruction/lower urinary tract symptoms     Cancer     prostate    Carcinoma of appendix     Coronary artery disease     Elevated PSA     Gilbert's disease 2021    Hyperlipidemia     Hypertension     MI (myocardial infarction)     Primary appendiceal adenocarcinoma     Stented coronary artery 2005    RCA     Social History     Socioeconomic History    Marital status:    Tobacco Use    Smoking status: Former Smoker     Quit date: 1991     Years since quittin.8    Smokeless tobacco: Never Used    Tobacco comment: cigars and pipe   Substance and Sexual Activity    Alcohol use: Not Currently    Drug use: Not Currently         Objective    Physical Exam     Vitals:    22 0854   BP: 137/73   Pulse: 64   Resp: 16   Temp: 97.8 °F (36.6 °C)     Awake alert no acute distress  Normocephalic atraumatic pupils equal round reactive  Normal respiratory effort  Normal rate  Distal pulses intact  Peripheral edema  Grossly cranial nerves 2-12 grossly intact sensorimotor grossly intact  Patient ambulatory on his own power        [facility]  Sodium   Date  Value Ref Range Status   04/08/2022 142 136 - 145 mmol/L Final     Potassium   Date Value Ref Range Status   04/08/2022 4.3 3.5 - 5.1 mmol/L Final     Chloride   Date Value Ref Range Status   04/08/2022 106 95 - 110 mmol/L Final     CO2   Date Value Ref Range Status   04/08/2022 28 23 - 29 mmol/L Final     Glucose   Date Value Ref Range Status   04/08/2022 107 70 - 110 mg/dL Final     BUN   Date Value Ref Range Status   04/08/2022 13 8 - 23 mg/dL Final     Creatinine   Date Value Ref Range Status   04/08/2022 1.0 0.5 - 1.4 mg/dL Final     Calcium   Date Value Ref Range Status   04/08/2022 8.8 8.7 - 10.5 mg/dL Final     Total Protein   Date Value Ref Range Status   04/08/2022 6.3 6.0 - 8.4 g/dL Final     Albumin   Date Value Ref Range Status   04/08/2022 3.7 3.5 - 5.2 g/dL Final     Total Bilirubin   Date Value Ref Range Status   04/08/2022 2.4 (H) 0.1 - 1.0 mg/dL Final     Comment:     For infants and newborns, interpretation of results should be based  on gestational age, weight and in agreement with clinical  observations.    Premature Infant recommended reference ranges:  Up to 24 hours.............<8.0 mg/dL  Up to 48 hours............<12.0 mg/dL  3-5 days..................<15.0 mg/dL  6-29 days.................<15.0 mg/dL       Alkaline Phosphatase   Date Value Ref Range Status   04/08/2022 63 55 - 135 U/L Final     AST   Date Value Ref Range Status   04/08/2022 20 10 - 40 U/L Final     ALT   Date Value Ref Range Status   04/08/2022 21 10 - 44 U/L Final     Anion Gap   Date Value Ref Range Status   04/08/2022 8 8 - 16 mmol/L Final     eGFR if    Date Value Ref Range Status   04/08/2022 >60 >60 mL/min/1.73 m^2 Final     eGFR if non    Date Value Ref Range Status   04/08/2022 >60 >60 mL/min/1.73 m^2 Final     Comment:     Calculation used to obtain the estimated glomerular filtration  rate (eGFR) is the CKD-EPI equation.        Lab Results   Component Value Date    WBC 5.46  04/08/2022    HGB 13.0 (L) 04/08/2022    HCT 38.4 (L) 04/08/2022     (H) 04/08/2022     (L) 04/08/2022 05/06/2021 surgical specimen  FINAL DIAGNOSIS:   05/10/2021 JAR/jr     ILEUM AND CECUM, ILEOCECETOMY   --APPENDIX WITH INVASIVE MODERATELY DIFFERENTIATED ADENOCARCINOMA.   --ASSOCIATED WITH ACUTE APPENDICITIS WITH RUPTURE.   --FIVE BENIGN LYMPH NODES.   --BACKGROUND COLON AND SMALL INTESTINE WITH SEROSAL ADHESIONS.   --SEE TUMOR SYNOPTIC REPORT.     Comment:   Additional dissection revealed one additional benign lymph node.     Assessment Plan    appendiceal adenocarcinoma.     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    I have discussed the case with Dr. Sundar Garcia.      With evidence of a rupture consider microscopic tumor deposit as a high risk factor.    Patient is 77 years old with comorbidities such as a previous diagnosis of MI as well as recent  TURP    Gilbert syndrome    > Completed capecitabine therapy 10 cycles.  Capecitabine monotherapy 1000mg/m2 b.i.d. (from 1250mg/m2 due to underlying liver condition, age and comorbidities) daily 2 weeks on 1 week off Will start NCCN surveillance guidelines monitor.    > rupture high risk factor will have to consider microscopic tumor deposit.  However given patient's age and comorbidity it is  unlikely that he will tolerate any aggressive measure.  I have spoken to surgical team, reported patient cannot tolerate additional surgery.    > gallbladder infection, elevated transaminitis, cholecystectomy on 10/11/2021.  Follow up with surgery    > RTC 3 month with lab

## 2022-04-11 NOTE — TELEPHONE ENCOUNTER
Scheduled patient for follow-up and lab appointment per Dr. Cartagena's orders below. Linked labs to appointment. Reviewed AVS with patient. Gave patient thank you/survey sheet. The patient has no questions at this time.             ----- Message from Shaun Cartagena MD sent at 4/11/2022  9:17 AM CDT -----  RTC 3 months with labs.

## 2022-04-18 ENCOUNTER — PATIENT MESSAGE (OUTPATIENT)
Dept: ADMINISTRATIVE | Facility: OTHER | Age: 77
End: 2022-04-18
Payer: MEDICARE

## 2022-07-08 ENCOUNTER — LAB VISIT (OUTPATIENT)
Dept: LAB | Facility: HOSPITAL | Age: 77
End: 2022-07-08
Attending: INTERNAL MEDICINE
Payer: MEDICARE

## 2022-07-08 DIAGNOSIS — R74.01 ELEVATED TRANSAMINASE LEVEL: ICD-10-CM

## 2022-07-08 DIAGNOSIS — D51.3 OTHER DIETARY VITAMIN B12 DEFICIENCY ANEMIA: ICD-10-CM

## 2022-07-08 DIAGNOSIS — E80.4 GILBERT'S SYNDROME: ICD-10-CM

## 2022-07-08 DIAGNOSIS — C18.1 CARCINOMA OF APPENDIX: ICD-10-CM

## 2022-07-08 DIAGNOSIS — D75.89 MACROCYTOSIS: ICD-10-CM

## 2022-07-08 DIAGNOSIS — D64.9 ANEMIA, UNSPECIFIED TYPE: ICD-10-CM

## 2022-07-08 DIAGNOSIS — D52.0 DIETARY FOLATE DEFICIENCY ANEMIA: ICD-10-CM

## 2022-07-08 LAB
ALBUMIN SERPL BCP-MCNC: 3.8 G/DL (ref 3.5–5.2)
ALP SERPL-CCNC: 52 U/L (ref 55–135)
ALT SERPL W/O P-5'-P-CCNC: 27 U/L (ref 10–44)
ANION GAP SERPL CALC-SCNC: 10 MMOL/L (ref 8–16)
AST SERPL-CCNC: 21 U/L (ref 10–40)
BASOPHILS # BLD AUTO: 0.05 K/UL (ref 0–0.2)
BASOPHILS NFR BLD: 0.7 % (ref 0–1.9)
BILIRUB SERPL-MCNC: 1.7 MG/DL (ref 0.1–1)
BUN SERPL-MCNC: 23 MG/DL (ref 8–23)
CALCIUM SERPL-MCNC: 8.7 MG/DL (ref 8.7–10.5)
CEA SERPL-MCNC: 2.2 NG/ML (ref 0–5)
CHLORIDE SERPL-SCNC: 107 MMOL/L (ref 95–110)
CO2 SERPL-SCNC: 23 MMOL/L (ref 23–29)
CREAT SERPL-MCNC: 0.9 MG/DL (ref 0.5–1.4)
DIFFERENTIAL METHOD: ABNORMAL
EOSINOPHIL # BLD AUTO: 0.1 K/UL (ref 0–0.5)
EOSINOPHIL NFR BLD: 1.1 % (ref 0–8)
ERYTHROCYTE [DISTWIDTH] IN BLOOD BY AUTOMATED COUNT: 11.9 % (ref 11.5–14.5)
EST. GFR  (AFRICAN AMERICAN): >60 ML/MIN/1.73 M^2
EST. GFR  (NON AFRICAN AMERICAN): >60 ML/MIN/1.73 M^2
FERRITIN SERPL-MCNC: 113 NG/ML (ref 20–300)
FOLATE SERPL-MCNC: 8.7 NG/ML (ref 4–24)
GLUCOSE SERPL-MCNC: 100 MG/DL (ref 70–110)
HCT VFR BLD AUTO: 39.4 % (ref 40–54)
HGB BLD-MCNC: 13.5 G/DL (ref 14–18)
IMM GRANULOCYTES # BLD AUTO: 0.02 K/UL (ref 0–0.04)
IMM GRANULOCYTES NFR BLD AUTO: 0.3 % (ref 0–0.5)
IRON SERPL-MCNC: 91 UG/DL (ref 45–160)
LYMPHOCYTES # BLD AUTO: 1.4 K/UL (ref 1–4.8)
LYMPHOCYTES NFR BLD: 19.4 % (ref 18–48)
MAGNESIUM SERPL-MCNC: 2.1 MG/DL (ref 1.6–2.6)
MCH RBC QN AUTO: 31.6 PG (ref 27–31)
MCHC RBC AUTO-ENTMCNC: 34.3 G/DL (ref 32–36)
MCV RBC AUTO: 92 FL (ref 82–98)
MONOCYTES # BLD AUTO: 0.7 K/UL (ref 0.3–1)
MONOCYTES NFR BLD: 9.9 % (ref 4–15)
NEUTROPHILS # BLD AUTO: 4.9 K/UL (ref 1.8–7.7)
NEUTROPHILS NFR BLD: 68.6 % (ref 38–73)
NRBC BLD-RTO: 0 /100 WBC
PLATELET # BLD AUTO: 207 K/UL (ref 150–450)
PMV BLD AUTO: 9.8 FL (ref 9.2–12.9)
POTASSIUM SERPL-SCNC: 4.2 MMOL/L (ref 3.5–5.1)
PROT SERPL-MCNC: 6.4 G/DL (ref 6–8.4)
RBC # BLD AUTO: 4.27 M/UL (ref 4.6–6.2)
SATURATED IRON: 22 % (ref 20–50)
SODIUM SERPL-SCNC: 140 MMOL/L (ref 136–145)
TOTAL IRON BINDING CAPACITY: 406 UG/DL (ref 250–450)
TRANSFERRIN SERPL-MCNC: 274 MG/DL (ref 200–375)
VIT B12 SERPL-MCNC: <146 PG/ML (ref 210–950)
WBC # BLD AUTO: 7.17 K/UL (ref 3.9–12.7)

## 2022-07-08 PROCEDURE — 82746 ASSAY OF FOLIC ACID SERUM: CPT | Performed by: INTERNAL MEDICINE

## 2022-07-08 PROCEDURE — 83735 ASSAY OF MAGNESIUM: CPT | Performed by: INTERNAL MEDICINE

## 2022-07-08 PROCEDURE — 82728 ASSAY OF FERRITIN: CPT | Performed by: INTERNAL MEDICINE

## 2022-07-08 PROCEDURE — 36415 COLL VENOUS BLD VENIPUNCTURE: CPT | Performed by: INTERNAL MEDICINE

## 2022-07-08 PROCEDURE — 80053 COMPREHEN METABOLIC PANEL: CPT | Performed by: INTERNAL MEDICINE

## 2022-07-08 PROCEDURE — 82607 VITAMIN B-12: CPT | Performed by: INTERNAL MEDICINE

## 2022-07-08 PROCEDURE — 85025 COMPLETE CBC W/AUTO DIFF WBC: CPT | Performed by: INTERNAL MEDICINE

## 2022-07-08 PROCEDURE — 83540 ASSAY OF IRON: CPT | Performed by: INTERNAL MEDICINE

## 2022-07-08 PROCEDURE — 82378 CARCINOEMBRYONIC ANTIGEN: CPT | Performed by: INTERNAL MEDICINE

## 2022-07-10 NOTE — PROGRESS NOTES
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  History of benign prostate hypertrophy with obstructive urine output.  Status post TURP by Dr Craig Reza on 21    History of prostate cancer.  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  Associated with acute appendicitis with a rupture.  Five benign lymph nodes retrieved.    Tumor size 0.8 cm.  Histological type adenocarcinoma.  Histologic  Grade moderately    Differentiated.  Tumor invades muscular peripheral.  Margin negative.  Tumor deposit negative.  Lymphovascular invasion negative.  Perineural invasion    Negative.  Pathological staging [id number]    On keep side being 1500mg b.i.d. daily 2 weeks on 1 week off    Past Medical History:   Diagnosis Date    BPH with obstruction/lower urinary tract symptoms     Cancer     prostate    Carcinoma of appendix     Coronary artery disease     Elevated PSA     Gilbert's disease 2021    Hyperlipidemia     Hypertension     MI (myocardial infarction)     Primary appendiceal adenocarcinoma     Stented coronary artery 2005    RCA     Social History     Socioeconomic History    Marital status:    Tobacco Use    Smoking status: Former Smoker     Quit date: 1991     Years since quittin.0    Smokeless tobacco: Never Used    Tobacco comment: cigars and pipe   Substance and Sexual Activity    Alcohol use: Not Currently    Drug use: Not Currently         Objective    Physical Exam     There were no vitals filed for this visit.  Awake alert no acute distress  Normocephalic atraumatic pupils equal round reactive  Normal respiratory effort  Normal rate  Distal pulses intact  Peripheral edema  Grossly cranial nerves 2-12 grossly intact sensorimotor grossly intact  Patient ambulatory on his own power        CMP  Sodium   Date Value Ref Range Status   2022 140 136 -  145 mmol/L Final     Potassium   Date Value Ref Range Status   07/08/2022 4.2 3.5 - 5.1 mmol/L Final     Chloride   Date Value Ref Range Status   07/08/2022 107 95 - 110 mmol/L Final     CO2   Date Value Ref Range Status   07/08/2022 23 23 - 29 mmol/L Final     Glucose   Date Value Ref Range Status   07/08/2022 100 70 - 110 mg/dL Final     BUN   Date Value Ref Range Status   07/08/2022 23 8 - 23 mg/dL Final     Creatinine   Date Value Ref Range Status   07/08/2022 0.9 0.5 - 1.4 mg/dL Final     Calcium   Date Value Ref Range Status   07/08/2022 8.7 8.7 - 10.5 mg/dL Final     Total Protein   Date Value Ref Range Status   07/08/2022 6.4 6.0 - 8.4 g/dL Final     Albumin   Date Value Ref Range Status   07/08/2022 3.8 3.5 - 5.2 g/dL Final     Total Bilirubin   Date Value Ref Range Status   07/08/2022 1.7 (H) 0.1 - 1.0 mg/dL Final     Comment:     For infants and newborns, interpretation of results should be based  on gestational age, weight and in agreement with clinical  observations.    Premature Infant recommended reference ranges:  Up to 24 hours.............<8.0 mg/dL  Up to 48 hours............<12.0 mg/dL  3-5 days..................<15.0 mg/dL  6-29 days.................<15.0 mg/dL       Alkaline Phosphatase   Date Value Ref Range Status   07/08/2022 52 (L) 55 - 135 U/L Final     AST   Date Value Ref Range Status   07/08/2022 21 10 - 40 U/L Final     ALT   Date Value Ref Range Status   07/08/2022 27 10 - 44 U/L Final     Anion Gap   Date Value Ref Range Status   07/08/2022 10 8 - 16 mmol/L Final     eGFR if    Date Value Ref Range Status   07/08/2022 >60 >60 mL/min/1.73 m^2 Final     eGFR if non    Date Value Ref Range Status   07/08/2022 >60 >60 mL/min/1.73 m^2 Final     Comment:     Calculation used to obtain the estimated glomerular filtration  rate (eGFR) is the CKD-EPI equation.        Lab Results   Component Value Date    WBC 7.17 07/08/2022    HGB 13.5 (L) 07/08/2022    HCT  39.4 (L) 07/08/2022    MCV 92 07/08/2022     07/08/2022 05/06/2021 surgical specimen  FINAL DIAGNOSIS:   05/10/2021 JAR/jr     ILEUM AND CECUM, ILEOCECETOMY   --APPENDIX WITH INVASIVE MODERATELY DIFFERENTIATED ADENOCARCINOMA.   --ASSOCIATED WITH ACUTE APPENDICITIS WITH RUPTURE.   --FIVE BENIGN LYMPH NODES.   --BACKGROUND COLON AND SMALL INTESTINE WITH SEROSAL ADHESIONS.   --SEE TUMOR SYNOPTIC REPORT.     Comment:   Additional dissection revealed one additional benign lymph node.     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 Sundar Garcia.  Unable to preform additional surgery due to patient co-morbility     With evidence of a rupture consider microscopic tumor deposit as a high risk factor.    Patient is 77 years old with comorbidities such as a previous diagnosis of MI as well as recent  TURP    Gilbert syndrome    CEA 2.2 one year post surgery resection     B12 146    Stable H/H    > Completed capecitabine therapy 10 cycles.  Capecitabine monotherapy 1000mg/m2 b.i.d. (from 1250mg/m2 due to underlying liver condition, age and comorbidities) daily 2 weeks on 1 week off Will start NCCN surveillance guidelines monitor.    > rupture high risk factor will have to consider microscopic tumor deposit.  However given patient's age and  comorbidity it is unlikely that he will tolerate any aggressive measure.  I have spoken to surgical team, reported patient cannot tolerate additional surgery.    > gallbladder infection, elevated transaminitis, cholecystectomy on 10/11/2021.  Follow up with surgery    > start on B12 daily     > RTC 3 month with lab

## 2022-07-11 ENCOUNTER — OFFICE VISIT (OUTPATIENT)
Dept: HEMATOLOGY/ONCOLOGY | Facility: CLINIC | Age: 77
End: 2022-07-11
Payer: MEDICARE

## 2022-07-11 VITALS
DIASTOLIC BLOOD PRESSURE: 81 MMHG | HEART RATE: 62 BPM | SYSTOLIC BLOOD PRESSURE: 176 MMHG | RESPIRATION RATE: 17 BRPM | WEIGHT: 226.44 LBS | OXYGEN SATURATION: 97 % | BODY MASS INDEX: 35.54 KG/M2 | TEMPERATURE: 97 F | HEIGHT: 67 IN

## 2022-07-11 DIAGNOSIS — C18.1 CARCINOMA OF APPENDIX: Primary | ICD-10-CM

## 2022-07-11 DIAGNOSIS — E53.8 B12 DEFICIENCY: ICD-10-CM

## 2022-07-11 DIAGNOSIS — R74.01 ELEVATED TRANSAMINASE LEVEL: ICD-10-CM

## 2022-07-11 DIAGNOSIS — D64.9 ANEMIA, UNSPECIFIED TYPE: ICD-10-CM

## 2022-07-11 DIAGNOSIS — E80.4 GILBERT'S SYNDROME: ICD-10-CM

## 2022-07-11 PROCEDURE — 99213 OFFICE O/P EST LOW 20 MIN: CPT | Mod: PBBFAC,PO | Performed by: INTERNAL MEDICINE

## 2022-07-11 PROCEDURE — 99999 PR PBB SHADOW E&M-EST. PATIENT-LVL III: ICD-10-PCS | Mod: PBBFAC,,, | Performed by: INTERNAL MEDICINE

## 2022-07-11 PROCEDURE — 99999 PR PBB SHADOW E&M-EST. PATIENT-LVL III: CPT | Mod: PBBFAC,,, | Performed by: INTERNAL MEDICINE

## 2022-07-11 PROCEDURE — 99214 PR OFFICE/OUTPT VISIT, EST, LEVL IV, 30-39 MIN: ICD-10-PCS | Mod: S$PBB,,, | Performed by: INTERNAL MEDICINE

## 2022-07-11 PROCEDURE — 99214 OFFICE O/P EST MOD 30 MIN: CPT | Mod: S$PBB,,, | Performed by: INTERNAL MEDICINE

## 2022-10-07 ENCOUNTER — LAB VISIT (OUTPATIENT)
Dept: LAB | Facility: HOSPITAL | Age: 77
End: 2022-10-07
Attending: INTERNAL MEDICINE
Payer: MEDICARE

## 2022-10-07 DIAGNOSIS — E53.8 B12 DEFICIENCY: ICD-10-CM

## 2022-10-07 DIAGNOSIS — D64.9 ANEMIA, UNSPECIFIED TYPE: ICD-10-CM

## 2022-10-07 DIAGNOSIS — E80.4 GILBERT'S SYNDROME: ICD-10-CM

## 2022-10-07 DIAGNOSIS — R74.01 ELEVATED TRANSAMINASE LEVEL: ICD-10-CM

## 2022-10-07 DIAGNOSIS — C18.1 CARCINOMA OF APPENDIX: ICD-10-CM

## 2022-10-07 LAB
ALBUMIN SERPL BCP-MCNC: 3.7 G/DL (ref 3.5–5.2)
ALP SERPL-CCNC: 63 U/L (ref 55–135)
ALT SERPL W/O P-5'-P-CCNC: 27 U/L (ref 10–44)
ANION GAP SERPL CALC-SCNC: 10 MMOL/L (ref 8–16)
AST SERPL-CCNC: 21 U/L (ref 10–40)
BASOPHILS # BLD AUTO: 0.07 K/UL (ref 0–0.2)
BASOPHILS NFR BLD: 1.2 % (ref 0–1.9)
BILIRUB SERPL-MCNC: 1.8 MG/DL (ref 0.1–1)
BUN SERPL-MCNC: 15 MG/DL (ref 8–23)
CALCIUM SERPL-MCNC: 8.7 MG/DL (ref 8.7–10.5)
CEA SERPL-MCNC: 2.3 NG/ML (ref 0–5)
CHLORIDE SERPL-SCNC: 106 MMOL/L (ref 95–110)
CO2 SERPL-SCNC: 25 MMOL/L (ref 23–29)
CREAT SERPL-MCNC: 1 MG/DL (ref 0.5–1.4)
DIFFERENTIAL METHOD: ABNORMAL
EOSINOPHIL # BLD AUTO: 0.1 K/UL (ref 0–0.5)
EOSINOPHIL NFR BLD: 1.4 % (ref 0–8)
ERYTHROCYTE [DISTWIDTH] IN BLOOD BY AUTOMATED COUNT: 12.3 % (ref 11.5–14.5)
EST. GFR  (NO RACE VARIABLE): >60 ML/MIN/1.73 M^2
GLUCOSE SERPL-MCNC: 120 MG/DL (ref 70–110)
HCT VFR BLD AUTO: 40.2 % (ref 40–54)
HGB BLD-MCNC: 13.6 G/DL (ref 14–18)
IMM GRANULOCYTES # BLD AUTO: 0.01 K/UL (ref 0–0.04)
IMM GRANULOCYTES NFR BLD AUTO: 0.2 % (ref 0–0.5)
LYMPHOCYTES # BLD AUTO: 1.3 K/UL (ref 1–4.8)
LYMPHOCYTES NFR BLD: 22.8 % (ref 18–48)
MAGNESIUM SERPL-MCNC: 2.1 MG/DL (ref 1.6–2.6)
MCH RBC QN AUTO: 31.3 PG (ref 27–31)
MCHC RBC AUTO-ENTMCNC: 33.8 G/DL (ref 32–36)
MCV RBC AUTO: 93 FL (ref 82–98)
MONOCYTES # BLD AUTO: 0.6 K/UL (ref 0.3–1)
MONOCYTES NFR BLD: 9.5 % (ref 4–15)
NEUTROPHILS # BLD AUTO: 3.8 K/UL (ref 1.8–7.7)
NEUTROPHILS NFR BLD: 64.9 % (ref 38–73)
NRBC BLD-RTO: 0 /100 WBC
PLATELET # BLD AUTO: 201 K/UL (ref 150–450)
PMV BLD AUTO: 9.8 FL (ref 9.2–12.9)
POTASSIUM SERPL-SCNC: 4.1 MMOL/L (ref 3.5–5.1)
PROT SERPL-MCNC: 6.2 G/DL (ref 6–8.4)
RBC # BLD AUTO: 4.34 M/UL (ref 4.6–6.2)
SODIUM SERPL-SCNC: 141 MMOL/L (ref 136–145)
VIT B12 SERPL-MCNC: 217 PG/ML (ref 210–950)
WBC # BLD AUTO: 5.79 K/UL (ref 3.9–12.7)

## 2022-10-07 PROCEDURE — 82378 CARCINOEMBRYONIC ANTIGEN: CPT | Performed by: INTERNAL MEDICINE

## 2022-10-07 PROCEDURE — 80053 COMPREHEN METABOLIC PANEL: CPT | Performed by: INTERNAL MEDICINE

## 2022-10-07 PROCEDURE — 36415 COLL VENOUS BLD VENIPUNCTURE: CPT | Performed by: INTERNAL MEDICINE

## 2022-10-07 PROCEDURE — 83735 ASSAY OF MAGNESIUM: CPT | Performed by: INTERNAL MEDICINE

## 2022-10-07 PROCEDURE — 82607 VITAMIN B-12: CPT | Performed by: INTERNAL MEDICINE

## 2022-10-07 PROCEDURE — 85025 COMPLETE CBC W/AUTO DIFF WBC: CPT | Performed by: INTERNAL MEDICINE

## 2022-10-10 ENCOUNTER — OFFICE VISIT (OUTPATIENT)
Dept: HEMATOLOGY/ONCOLOGY | Facility: CLINIC | Age: 77
End: 2022-10-10
Payer: MEDICARE

## 2022-10-10 VITALS
WEIGHT: 223.75 LBS | BODY MASS INDEX: 35.12 KG/M2 | RESPIRATION RATE: 14 BRPM | HEART RATE: 60 BPM | DIASTOLIC BLOOD PRESSURE: 68 MMHG | HEIGHT: 67 IN | OXYGEN SATURATION: 100 % | TEMPERATURE: 97 F | SYSTOLIC BLOOD PRESSURE: 146 MMHG

## 2022-10-10 DIAGNOSIS — D75.89 MACROCYTOSIS: ICD-10-CM

## 2022-10-10 DIAGNOSIS — D64.9 ANEMIA, UNSPECIFIED TYPE: ICD-10-CM

## 2022-10-10 DIAGNOSIS — R74.01 ELEVATED TRANSAMINASE LEVEL: ICD-10-CM

## 2022-10-10 DIAGNOSIS — C18.1 CARCINOMA OF APPENDIX: Primary | ICD-10-CM

## 2022-10-10 DIAGNOSIS — E80.4 GILBERT'S SYNDROME: ICD-10-CM

## 2022-10-10 DIAGNOSIS — E53.8 B12 DEFICIENCY: ICD-10-CM

## 2022-10-10 PROCEDURE — 99214 OFFICE O/P EST MOD 30 MIN: CPT | Mod: S$PBB,,, | Performed by: INTERNAL MEDICINE

## 2022-10-10 PROCEDURE — 99999 PR PBB SHADOW E&M-EST. PATIENT-LVL IV: CPT | Mod: PBBFAC,,, | Performed by: INTERNAL MEDICINE

## 2022-10-10 PROCEDURE — 99999 PR PBB SHADOW E&M-EST. PATIENT-LVL IV: ICD-10-PCS | Mod: PBBFAC,,, | Performed by: INTERNAL MEDICINE

## 2022-10-10 PROCEDURE — 99214 PR OFFICE/OUTPT VISIT, EST, LEVL IV, 30-39 MIN: ICD-10-PCS | Mod: S$PBB,,, | Performed by: INTERNAL MEDICINE

## 2022-10-10 PROCEDURE — 99214 OFFICE O/P EST MOD 30 MIN: CPT | Mod: PBBFAC,PO | Performed by: INTERNAL MEDICINE

## 2022-10-10 NOTE — PROGRESS NOTES
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  History of benign prostate hypertrophy with obstructive urine output.  Status post TURP by Dr Craig Reaz on 21    History of prostate cancer.  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  Associated with acute appendicitis with a rupture.  Five benign lymph nodes retrieved.    Tumor size 0.8 cm.  Histological type adenocarcinoma.  Histologic  Grade moderately    Differentiated.  Tumor invades muscular peripheral.  Margin negative.  Tumor deposit negative.  Lymphovascular invasion negative.  Perineural invasion    Negative.  Pathological staging [id number]    On keep side being 1500mg b.i.d. daily 2 weeks on 1 week off    Past Medical History:   Diagnosis Date    BPH with obstruction/lower urinary tract symptoms     Cancer     prostate    Carcinoma of appendix     Coronary artery disease     Elevated PSA     Gilbert's disease 2021    Hyperlipidemia     Hypertension     MI (myocardial infarction)     Primary appendiceal adenocarcinoma     Stented coronary artery 2005    RCA     Social History     Socioeconomic History    Marital status:    Tobacco Use    Smoking status: Former     Types: Cigarettes     Quit date: 1991     Years since quittin.3    Smokeless tobacco: Never    Tobacco comments:     cigars and pipe   Substance and Sexual Activity    Alcohol use: Not Currently    Drug use: Not Currently         Objective    Physical Exam     Vitals:    10/10/22 0905   BP: (!) 146/68   Pulse: 60   Resp: 14   Temp: 96.9 °F (36.1 °C)     Awake alert no acute distress  Normocephalic atraumatic pupils equal round reactive  Normal respiratory effort  Normal rate  Distal pulses intact  Peripheral edema  Grossly cranial nerves 2-12 grossly intact sensorimotor grossly intact  Patient ambulatory on his own power        [facility]  Sodium   Date  Value Ref Range Status   10/07/2022 141 136 - 145 mmol/L Final     Potassium   Date Value Ref Range Status   10/07/2022 4.1 3.5 - 5.1 mmol/L Final     Chloride   Date Value Ref Range Status   10/07/2022 106 95 - 110 mmol/L Final     CO2   Date Value Ref Range Status   10/07/2022 25 23 - 29 mmol/L Final     Glucose   Date Value Ref Range Status   10/07/2022 120 (H) 70 - 110 mg/dL Final     BUN   Date Value Ref Range Status   10/07/2022 15 8 - 23 mg/dL Final     Creatinine   Date Value Ref Range Status   10/07/2022 1.0 0.5 - 1.4 mg/dL Final     Calcium   Date Value Ref Range Status   10/07/2022 8.7 8.7 - 10.5 mg/dL Final     Total Protein   Date Value Ref Range Status   10/07/2022 6.2 6.0 - 8.4 g/dL Final     Albumin   Date Value Ref Range Status   10/07/2022 3.7 3.5 - 5.2 g/dL Final     Total Bilirubin   Date Value Ref Range Status   10/07/2022 1.8 (H) 0.1 - 1.0 mg/dL Final     Comment:     For infants and newborns, interpretation of results should be based  on gestational age, weight and in agreement with clinical  observations.    Premature Infant recommended reference ranges:  Up to 24 hours.............<8.0 mg/dL  Up to 48 hours............<12.0 mg/dL  3-5 days..................<15.0 mg/dL  6-29 days.................<15.0 mg/dL       Alkaline Phosphatase   Date Value Ref Range Status   10/07/2022 63 55 - 135 U/L Final     AST   Date Value Ref Range Status   10/07/2022 21 10 - 40 U/L Final     ALT   Date Value Ref Range Status   10/07/2022 27 10 - 44 U/L Final     Anion Gap   Date Value Ref Range Status   10/07/2022 10 8 - 16 mmol/L Final     eGFR if    Date Value Ref Range Status   07/08/2022 >60 >60 mL/min/1.73 m^2 Final     eGFR if non    Date Value Ref Range Status   07/08/2022 >60 >60 mL/min/1.73 m^2 Final     Comment:     Calculation used to obtain the estimated glomerular filtration  rate (eGFR) is the CKD-EPI equation.        Lab Results   Component Value Date    WBC 5.79  10/07/2022    HGB 13.6 (L) 10/07/2022    HCT 40.2 10/07/2022    MCV 93 10/07/2022     10/07/2022     05/06/2021 surgical specimen  FINAL DIAGNOSIS:   05/10/2021 JAR/jr     ILEUM AND CECUM, ILEOCECETOMY   --APPENDIX WITH INVASIVE MODERATELY DIFFERENTIATED ADENOCARCINOMA.   --ASSOCIATED WITH ACUTE APPENDICITIS WITH RUPTURE.   --FIVE BENIGN LYMPH NODES.   --BACKGROUND COLON AND SMALL INTESTINE WITH SEROSAL ADHESIONS.   --SEE TUMOR SYNOPTIC REPORT.     Comment:   Additional dissection revealed one additional benign lymph node.     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 Sundar Garcia.  Unable to preform additional surgery due to patient co-morbility     With evidence of a rupture consider microscopic tumor deposit as a high risk factor.    Patient is 77 years old with comorbidities such as a previous diagnosis of MI as well as recent  TURP    Gilbert syndrome    CEA 2.2 one year post surgery resection     B12 146    Stable H/H    > Completed capecitabine therapy 10 cycles.  Capecitabine monotherapy 1000mg/m2 b.i.d. (from 1250mg/m2 due to underlying liver condition, age and comorbidities) daily 2 weeks on 1 week off Will start NCCN surveillance guidelines monitor.    > rupture high risk factor will have to consider microscopic tumor deposit.   However given patient's age and comorbidity it is unlikely that he will tolerate any aggressive measure.  I have spoken to surgical team, reported patient cannot tolerate additional surgery.    > gallbladder infection, elevated transaminitis, cholecystectomy on 10/11/2021.  Follow up with surgery    > start on B12 daily with good respond will continue     > stable CEA at 2.3    > RTC 3 month with lab

## 2023-01-20 ENCOUNTER — HOSPITAL ENCOUNTER (OUTPATIENT)
Dept: RADIOLOGY | Facility: HOSPITAL | Age: 78
Discharge: HOME | End: 2023-01-20
Attending: INTERNAL MEDICINE
Payer: MEDICARE

## 2023-01-20 DIAGNOSIS — E80.4 GILBERT'S SYNDROME: ICD-10-CM

## 2023-01-20 DIAGNOSIS — D64.9 ANEMIA, UNSPECIFIED TYPE: ICD-10-CM

## 2023-01-20 DIAGNOSIS — E53.8 B12 DEFICIENCY: ICD-10-CM

## 2023-01-20 DIAGNOSIS — C18.1 CARCINOMA OF APPENDIX: ICD-10-CM

## 2023-01-20 DIAGNOSIS — R74.01 ELEVATED TRANSAMINASE LEVEL: ICD-10-CM

## 2023-01-20 PROCEDURE — 25500020 PHARM REV CODE 255: Performed by: INTERNAL MEDICINE

## 2023-01-20 PROCEDURE — A9698 NON-RAD CONTRAST MATERIALNOC: HCPCS | Performed by: INTERNAL MEDICINE

## 2023-01-20 PROCEDURE — 74177 CT ABDOMEN PELVIS WITH CONTRAST: ICD-10-PCS | Mod: 26,,, | Performed by: RADIOLOGY

## 2023-01-20 PROCEDURE — 25500020 PHARM REV CODE 255

## 2023-01-20 PROCEDURE — 74177 CT ABD & PELVIS W/CONTRAST: CPT | Mod: TC

## 2023-01-20 PROCEDURE — 74177 CT ABD & PELVIS W/CONTRAST: CPT | Mod: 26,,, | Performed by: RADIOLOGY

## 2023-01-20 RX ADMIN — IOHEXOL 100 ML: 350 INJECTION, SOLUTION INTRAVENOUS at 08:01

## 2023-01-20 RX ADMIN — BARIUM SULFATE 900 ML: 20 SUSPENSION ORAL at 08:01

## 2023-01-23 ENCOUNTER — OFFICE VISIT (OUTPATIENT)
Dept: HEMATOLOGY/ONCOLOGY | Facility: CLINIC | Age: 78
End: 2023-01-23
Payer: MEDICARE

## 2023-01-23 VITALS
WEIGHT: 226.63 LBS | HEIGHT: 67 IN | SYSTOLIC BLOOD PRESSURE: 180 MMHG | HEART RATE: 62 BPM | RESPIRATION RATE: 14 BRPM | OXYGEN SATURATION: 97 % | TEMPERATURE: 98 F | DIASTOLIC BLOOD PRESSURE: 81 MMHG | BODY MASS INDEX: 35.57 KG/M2

## 2023-01-23 DIAGNOSIS — R74.01 ELEVATED TRANSAMINASE LEVEL: ICD-10-CM

## 2023-01-23 DIAGNOSIS — E53.8 B12 DEFICIENCY: ICD-10-CM

## 2023-01-23 DIAGNOSIS — E80.4 GILBERT'S SYNDROME: ICD-10-CM

## 2023-01-23 DIAGNOSIS — C18.1 CARCINOMA OF APPENDIX: Primary | ICD-10-CM

## 2023-01-23 PROCEDURE — 99214 OFFICE O/P EST MOD 30 MIN: CPT | Mod: PBBFAC,PO | Performed by: INTERNAL MEDICINE

## 2023-01-23 PROCEDURE — 99999 PR PBB SHADOW E&M-EST. PATIENT-LVL IV: ICD-10-PCS | Mod: PBBFAC,,, | Performed by: INTERNAL MEDICINE

## 2023-01-23 PROCEDURE — 99999 PR PBB SHADOW E&M-EST. PATIENT-LVL IV: CPT | Mod: PBBFAC,,, | Performed by: INTERNAL MEDICINE

## 2023-01-23 PROCEDURE — 99214 PR OFFICE/OUTPT VISIT, EST, LEVL IV, 30-39 MIN: ICD-10-PCS | Mod: S$PBB,,, | Performed by: INTERNAL MEDICINE

## 2023-01-23 PROCEDURE — 99214 OFFICE O/P EST MOD 30 MIN: CPT | Mod: S$PBB,,, | Performed by: INTERNAL MEDICINE

## 2023-01-23 NOTE — PROGRESS NOTES
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  History of benign prostate hypertrophy with obstructive urine output.  Status post TURP by Dr Craig Reza on 21    History of prostate cancer.  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  Associated with acute appendicitis with a rupture.  Five benign lymph nodes retrieved.    Tumor size 0.8 cm.  Histological type adenocarcinoma.  Histologic  Grade moderately    Differentiated.  Tumor invades muscular peripheral.  Margin negative.  Tumor deposit negative.  Lymphovascular invasion negative.  Perineural invasion    Negative.  Pathological staging [id number]    On keep side being 1500mg b.i.d. daily 2 weeks on 1 week off    Past Medical History:   Diagnosis Date    BPH with obstruction/lower urinary tract symptoms     Cancer     prostate    Carcinoma of appendix     Coronary artery disease     Elevated PSA     Gilbert's disease 2021    Hyperlipidemia     Hypertension     MI (myocardial infarction)     Primary appendiceal adenocarcinoma     Stented coronary artery 2005    RCA     Social History     Socioeconomic History    Marital status:    Tobacco Use    Smoking status: Former     Types: Cigarettes     Quit date: 1991     Years since quittin.6    Smokeless tobacco: Never    Tobacco comments:     cigars and pipe   Substance and Sexual Activity    Alcohol use: Not Currently    Drug use: Not Currently         Objective    Physical Exam     Vitals:    23 1021   BP: (!) 180/81   Pulse: 62   Resp: 14   Temp: 97.7 °F (36.5 °C)     Awake alert no acute distress  Normocephalic atraumatic pupils equal round reactive  Normal respiratory effort  Normal rate  Distal pulses intact  Peripheral edema  Grossly cranial nerves 2-12 grossly intact sensorimotor grossly intact  Patient ambulatory on his own power        [facility]  Sodium   Date  Value Ref Range Status   01/20/2023 140 136 - 145 mmol/L Final     Potassium   Date Value Ref Range Status   01/20/2023 4.4 3.5 - 5.1 mmol/L Final     Chloride   Date Value Ref Range Status   01/20/2023 106 95 - 110 mmol/L Final     CO2   Date Value Ref Range Status   01/20/2023 26 23 - 29 mmol/L Final     Glucose   Date Value Ref Range Status   01/20/2023 114 (H) 70 - 110 mg/dL Final     BUN   Date Value Ref Range Status   01/20/2023 18 8 - 23 mg/dL Final     Creatinine   Date Value Ref Range Status   01/20/2023 1.0 0.5 - 1.4 mg/dL Final     Calcium   Date Value Ref Range Status   01/20/2023 8.5 (L) 8.7 - 10.5 mg/dL Final     Total Protein   Date Value Ref Range Status   01/20/2023 6.6 6.0 - 8.4 g/dL Final     Albumin   Date Value Ref Range Status   01/20/2023 3.9 3.5 - 5.2 g/dL Final     Total Bilirubin   Date Value Ref Range Status   01/20/2023 1.9 (H) 0.1 - 1.0 mg/dL Final     Comment:     For infants and newborns, interpretation of results should be based  on gestational age, weight and in agreement with clinical  observations.    Premature Infant recommended reference ranges:  Up to 24 hours.............<8.0 mg/dL  Up to 48 hours............<12.0 mg/dL  3-5 days..................<15.0 mg/dL  6-29 days.................<15.0 mg/dL       Alkaline Phosphatase   Date Value Ref Range Status   01/20/2023 56 55 - 135 U/L Final     AST   Date Value Ref Range Status   01/20/2023 24 10 - 40 U/L Final     ALT   Date Value Ref Range Status   01/20/2023 30 10 - 44 U/L Final     Anion Gap   Date Value Ref Range Status   01/20/2023 8 8 - 16 mmol/L Final     eGFR if    Date Value Ref Range Status   07/08/2022 >60 >60 mL/min/1.73 m^2 Final     eGFR if non    Date Value Ref Range Status   07/08/2022 >60 >60 mL/min/1.73 m^2 Final     Comment:     Calculation used to obtain the estimated glomerular filtration  rate (eGFR) is the CKD-EPI equation.        Lab Results   Component Value Date    WBC 6.48  01/20/2023    HGB 14.3 01/20/2023    HCT 42.4 01/20/2023    MCV 93 01/20/2023     01/20/2023 05/06/2021 surgical specimen  FINAL DIAGNOSIS:   05/10/2021 JAR/jr     ILEUM AND CECUM, ILEOCECETOMY   --APPENDIX WITH INVASIVE MODERATELY DIFFERENTIATED ADENOCARCINOMA.   --ASSOCIATED WITH ACUTE APPENDICITIS WITH RUPTURE.   --FIVE BENIGN LYMPH NODES.   --BACKGROUND COLON AND SMALL INTESTINE WITH SEROSAL ADHESIONS.   --SEE TUMOR SYNOPTIC REPORT.     Comment:   Additional dissection revealed one additional benign lymph node.     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 Sundar Garcia.  Unable to preform additional surgery due to patient co-morbility     With evidence of a rupture consider microscopic tumor deposit as a high risk factor.    Patient is 77 years old with comorbidities such as a previous diagnosis of MI as well as recent  TURP    Gilbert syndrome    CEA 2.2 one year post surgery resection     B12 146    Stable H/H    > Completed capecitabine therapy 10 cycles.  Capecitabine monotherapy 1000mg/m2 b.i.d. (from 1250mg/m2 due to underlying liver condition, age and comorbidities) daily 2 weeks on 1 week off Will start NCCN surveillance guidelines monitor.    > rupture high risk factor will have to consider microscopic tumor deposit.   However given patient's age and comorbidity it is unlikely that he will tolerate any aggressive measure.  I have spoken to surgical team, reported patient cannot tolerate additional surgery.    > review CT scan with patient today.  1.3 cm left kidney appear to be cystic.  Enlarged prostate.  Patient will follow up with Urology.    > stable CEA.  Patient should have repeat colonoscopy this year    > gallbladder infection, elevated transaminitis, cholecystectomy on 10/11/2021.  Follow up with surgery    > start on B12 daily with good respond will continue     > RTC 3 month with lab

## 2023-02-22 ENCOUNTER — OFFICE VISIT (OUTPATIENT)
Dept: UROLOGY | Facility: CLINIC | Age: 78
End: 2023-02-22
Payer: MEDICARE

## 2023-02-22 VITALS
SYSTOLIC BLOOD PRESSURE: 144 MMHG | HEART RATE: 60 BPM | HEIGHT: 66 IN | WEIGHT: 230 LBS | DIASTOLIC BLOOD PRESSURE: 82 MMHG | BODY MASS INDEX: 36.96 KG/M2

## 2023-02-22 DIAGNOSIS — R33.9 URINARY RETENTION: Primary | ICD-10-CM

## 2023-02-22 LAB — POC RESIDUAL URINE VOLUME: 71 ML (ref 0–100)

## 2023-02-22 PROCEDURE — 99999 PR PBB SHADOW E&M-EST. PATIENT-LVL III: ICD-10-PCS | Mod: PBBFAC,,, | Performed by: UROLOGY

## 2023-02-22 PROCEDURE — 51798 US URINE CAPACITY MEASURE: CPT | Mod: PBBFAC,PN | Performed by: UROLOGY

## 2023-02-22 PROCEDURE — 99214 PR OFFICE/OUTPT VISIT, EST, LEVL IV, 30-39 MIN: ICD-10-PCS | Mod: S$PBB,,, | Performed by: UROLOGY

## 2023-02-22 PROCEDURE — 99214 OFFICE O/P EST MOD 30 MIN: CPT | Mod: S$PBB,,, | Performed by: UROLOGY

## 2023-02-22 PROCEDURE — 99213 OFFICE O/P EST LOW 20 MIN: CPT | Mod: PBBFAC,PN | Performed by: UROLOGY

## 2023-02-22 PROCEDURE — 99999 PR PBB SHADOW E&M-EST. PATIENT-LVL III: CPT | Mod: PBBFAC,,, | Performed by: UROLOGY

## 2023-02-22 NOTE — PROGRESS NOTES
[facility] Established Patient/H&P:    Ryan Stewart is a 77 y.o. male who presents for follow up for lower urinary tract symptoms.     Patient with a history of elevated PSA, gross hematuria and enlarged prostate previously managed with Dr. Quintanilla. He presented to [facility] ED with urinary retention after appendectomy on 5/6/21 for ruptured appendicitis. 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     Alexis catheter placed with 1.5L urine. CT renal stone on 5/17/21 revealed a significantly enlarged prostate.  He underwent voiding trial in clinic with NP Eliana Baez, but had the alexis replaced. He has since developed gross hematuria. He states that prior to his appendectomy he was voiding well. He has been on Flomax for several years. Now on Finasteride.     On review of records, he has a history of elevated PSA s/p biopsy in [place] in 2002. Declined any further biopsies. He is on Plavix for history of MI.        Interval History     7/22/21: Patient with a 170 cc prostate s/p TURP on 6/22/21. Pathology negative. Discharged home POD 2. Underwent successful voiding trial 1 week post-op. States his lower urinary tract symptoms have improved significantly. Remains on Flomax and Finasteride.      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      1/21/22: Patient is doing well. Continues to void fine with mild lower urinary tract symptoms. IPSS 4. QoL 0. PVR 26 mL. Discontinued Flomax and Finasteride.    2/22/23: Here for follow up. Doing well with no complaints. IPSS remains at 4. QoL 0. PVR 71 mL.     He underwent CT abdomen pelvis with contrast which showed an enlarged prostate, 13 mm lesion on the left kidney - not changed in size and fatty liver.      Denies any fever, chills, flank pain, nephrolithiasis,  trauma or history of  malignancy.         PSA  5.3                   11/3/20  4.7                   8/19/19  5.3                   9/4/18  6.0                    7/8/16  8.9                   9/4/13     Urine culture  Enterococcus  6/2/21  Enterococcus  5/6/21     IPSS    QoL  4 0 2/22/23  6          1          7/22/21     PVR  71 mL  2/22/23  26 mL              1/21/22  0 mL                7/22/21  109 mL            6/29/21    Past Medical History:   Diagnosis Date    BPH with obstruction/lower urinary tract symptoms     Cancer     prostate    Carcinoma of appendix     Coronary artery disease     Elevated PSA     Gilbert's disease 09/28/2021    Hyperlipidemia     Hypertension     MI (myocardial infarction) 2005    Primary appendiceal adenocarcinoma     Stented coronary artery 2005    RCA       Past Surgical History:   Procedure Laterality Date    APPENDECTOMY      CARDIAC SURGERY      stent coronary    COLON SURGERY      CYSTOSCOPY      CYSTOSCOPY N/A 6/2/2021    Procedure: CYSTOSCOPY;  Surgeon: Craig Reza Jr., MD;  Location: [facility];  Service: Urology;  Laterality: N/A;    EAR EXAMINATION UNDER ANESTHESIA      LAPAROSCOPIC APPENDECTOMY N/A 5/6/2021    Procedure: APPENDECTOMY, LAPAROSCOPIC;  Surgeon: Sundar Garcia MD;  Location: [facility] OR;  Service: General;  Laterality: N/A;    LAPAROSCOPIC CHOLECYSTECTOMY N/A 10/10/2021    Procedure: CHOLECYSTECTOMY, LAPAROSCOPIC;  Surgeon: Eulogio Yusuf MD;  Location: [facility] OR;  Service: General;  Laterality: N/A;    SURGICAL REMOVAL OF ILEUM WITH CECUM  5/6/2021    Procedure: EXCISION, CECUM WITH ILEUM;  Surgeon: Sundar Garcia MD;  Location: [facility] OR;  Service: General;;    TRANSRECTAL ULTRASOUND EXAMINATION N/A 6/2/2021    Procedure: ULTRASOUND, RECTAL APPROACH;  Surgeon: Craig Reza Jr., MD;  Location: [facility] OR;  Service: Urology;  Laterality: N/A;    TRANSURETHRAL RESECTION OF PROSTATE N/A 6/22/2021    Procedure: TURP (TRANSURETHRAL RESECTION OF PROSTATE);  Surgeon: Craig Reza Jr., MD;  Location: [facility] OR;  Service: Urology;  Laterality: N/A;       Review of patient's allergies indicates:   Allergen Reactions     "Ciprofloxacin      vomiting    Rapaflo [silodosin]      Urinating increased, kidney pain    Simvastatin Other (See Comments)     Severe muscle pain       Medications Reviewed: see MAR    FOCUSED PHYSICAL EXAM:    Vitals:    02/22/23 0855   BP: (!) 144/82   Pulse: 60     Body mass index is 37.12 kg/m². Weight: 104.3 kg (230 lb) Height: 5' 6" (167.6 cm)       General: Alert, cooperative, no distress, appears stated age  Abdomen: Soft, non-tender, no CVA tenderness, non-distended        LABS:    Recent Results (from the past 336 hour(s))   POCT Bladder Scan    Collection Time: 02/22/23  8:57 AM   Result Value Ref Range    POC Residual Urine Volume 71 0 - 100 mL           Assessment/Diagnosis:    1. Urinary retention  POCT Bladder Scan          Plans:    - I spent 30 minutes of the day of this encounter preparing for, treating and managing this patient. Patient is doing well s/p TURP on 6/22/21. Pathology negative. LUTs have improved significantly. Has discontinued Flomax and Finasteride.   - Discontinue prostate cancer screening given his age and co-morbidities.  - RTC in 1 year with symptom score and PVR.     "

## 2023-04-21 ENCOUNTER — LAB VISIT (OUTPATIENT)
Dept: LAB | Facility: HOSPITAL | Age: 78
End: 2023-04-21
Attending: INTERNAL MEDICINE
Payer: MEDICARE

## 2023-04-21 DIAGNOSIS — C18.1 CARCINOMA OF APPENDIX: ICD-10-CM

## 2023-04-21 DIAGNOSIS — R74.01 ELEVATED TRANSAMINASE LEVEL: ICD-10-CM

## 2023-04-21 DIAGNOSIS — E80.4 GILBERT'S SYNDROME: ICD-10-CM

## 2023-04-21 DIAGNOSIS — E53.8 B12 DEFICIENCY: ICD-10-CM

## 2023-04-21 LAB
ALBUMIN SERPL BCP-MCNC: 3.8 G/DL (ref 3.5–5.2)
ALP SERPL-CCNC: 53 U/L (ref 55–135)
ALT SERPL W/O P-5'-P-CCNC: 31 U/L (ref 10–44)
ANION GAP SERPL CALC-SCNC: 7 MMOL/L (ref 8–16)
AST SERPL-CCNC: 21 U/L (ref 10–40)
BASOPHILS # BLD AUTO: 0.08 K/UL (ref 0–0.2)
BASOPHILS NFR BLD: 1 % (ref 0–1.9)
BILIRUB SERPL-MCNC: 1.7 MG/DL (ref 0.1–1)
BUN SERPL-MCNC: 18 MG/DL (ref 8–23)
CALCIUM SERPL-MCNC: 8.7 MG/DL (ref 8.7–10.5)
CEA SERPL-MCNC: 2.4 NG/ML (ref 0–5)
CHLORIDE SERPL-SCNC: 107 MMOL/L (ref 95–110)
CO2 SERPL-SCNC: 25 MMOL/L (ref 23–29)
CREAT SERPL-MCNC: 1 MG/DL (ref 0.5–1.4)
DIFFERENTIAL METHOD: ABNORMAL
EOSINOPHIL # BLD AUTO: 0.1 K/UL (ref 0–0.5)
EOSINOPHIL NFR BLD: 1 % (ref 0–8)
ERYTHROCYTE [DISTWIDTH] IN BLOOD BY AUTOMATED COUNT: 12.2 % (ref 11.5–14.5)
EST. GFR  (NO RACE VARIABLE): >60 ML/MIN/1.73 M^2
GLUCOSE SERPL-MCNC: 94 MG/DL (ref 70–110)
HCT VFR BLD AUTO: 40.9 % (ref 40–54)
HGB BLD-MCNC: 14 G/DL (ref 14–18)
IMM GRANULOCYTES # BLD AUTO: 0.02 K/UL (ref 0–0.04)
IMM GRANULOCYTES NFR BLD AUTO: 0.3 % (ref 0–0.5)
LYMPHOCYTES # BLD AUTO: 2 K/UL (ref 1–4.8)
LYMPHOCYTES NFR BLD: 26.3 % (ref 18–48)
MCH RBC QN AUTO: 31.4 PG (ref 27–31)
MCHC RBC AUTO-ENTMCNC: 34.2 G/DL (ref 32–36)
MCV RBC AUTO: 92 FL (ref 82–98)
MONOCYTES # BLD AUTO: 0.8 K/UL (ref 0.3–1)
MONOCYTES NFR BLD: 10.5 % (ref 4–15)
NEUTROPHILS # BLD AUTO: 4.7 K/UL (ref 1.8–7.7)
NEUTROPHILS NFR BLD: 60.9 % (ref 38–73)
NRBC BLD-RTO: 0 /100 WBC
PLATELET # BLD AUTO: 219 K/UL (ref 150–450)
PMV BLD AUTO: 9.7 FL (ref 9.2–12.9)
POTASSIUM SERPL-SCNC: 4.3 MMOL/L (ref 3.5–5.1)
PROT SERPL-MCNC: 6.4 G/DL (ref 6–8.4)
RBC # BLD AUTO: 4.46 M/UL (ref 4.6–6.2)
SODIUM SERPL-SCNC: 139 MMOL/L (ref 136–145)
WBC # BLD AUTO: 7.65 K/UL (ref 3.9–12.7)

## 2023-04-21 PROCEDURE — 82378 CARCINOEMBRYONIC ANTIGEN: CPT | Performed by: INTERNAL MEDICINE

## 2023-04-21 PROCEDURE — 36415 COLL VENOUS BLD VENIPUNCTURE: CPT | Performed by: INTERNAL MEDICINE

## 2023-04-21 PROCEDURE — 85025 COMPLETE CBC W/AUTO DIFF WBC: CPT | Performed by: INTERNAL MEDICINE

## 2023-04-21 PROCEDURE — 80053 COMPREHEN METABOLIC PANEL: CPT | Performed by: INTERNAL MEDICINE

## 2023-04-24 ENCOUNTER — OFFICE VISIT (OUTPATIENT)
Dept: HEMATOLOGY/ONCOLOGY | Facility: CLINIC | Age: 78
End: 2023-04-24
Payer: MEDICARE

## 2023-04-24 VITALS
TEMPERATURE: 98 F | SYSTOLIC BLOOD PRESSURE: 143 MMHG | HEART RATE: 61 BPM | BODY MASS INDEX: 35.68 KG/M2 | RESPIRATION RATE: 16 BRPM | HEIGHT: 67 IN | WEIGHT: 227.31 LBS | DIASTOLIC BLOOD PRESSURE: 74 MMHG | OXYGEN SATURATION: 98 %

## 2023-04-24 DIAGNOSIS — E80.4 GILBERT'S SYNDROME: ICD-10-CM

## 2023-04-24 DIAGNOSIS — C18.1 CARCINOMA OF APPENDIX: Primary | ICD-10-CM

## 2023-04-24 DIAGNOSIS — R74.01 ELEVATED TRANSAMINASE LEVEL: ICD-10-CM

## 2023-04-24 PROCEDURE — 99999 PR PBB SHADOW E&M-EST. PATIENT-LVL IV: CPT | Mod: PBBFAC,,, | Performed by: INTERNAL MEDICINE

## 2023-04-24 PROCEDURE — 99214 PR OFFICE/OUTPT VISIT, EST, LEVL IV, 30-39 MIN: ICD-10-PCS | Mod: S$PBB,,, | Performed by: INTERNAL MEDICINE

## 2023-04-24 PROCEDURE — 99214 OFFICE O/P EST MOD 30 MIN: CPT | Mod: PBBFAC,PO | Performed by: INTERNAL MEDICINE

## 2023-04-24 PROCEDURE — 99214 OFFICE O/P EST MOD 30 MIN: CPT | Mod: S$PBB,,, | Performed by: INTERNAL MEDICINE

## 2023-04-24 PROCEDURE — 99999 PR PBB SHADOW E&M-EST. PATIENT-LVL IV: ICD-10-PCS | Mod: PBBFAC,,, | Performed by: INTERNAL MEDICINE

## 2023-04-24 NOTE — PROGRESS NOTES
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  History of benign prostate hypertrophy with obstructive urine output.  Status post TURP by Dr Craig Reza on 21    History of prostate cancer.  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  Associated with acute appendicitis with a rupture.  Five benign lymph nodes retrieved.    Tumor size 0.8 cm.  Histological type adenocarcinoma.  Histologic  Grade moderately    Differentiated.  Tumor invades muscular peripheral.  Margin negative.  Tumor deposit negative.  Lymphovascular invasion negative.  Perineural invasion    Negative.  Pathological staging [id number]    On keep side being 1500mg b.i.d. daily 2 weeks on 1 week off    Past Medical History:   Diagnosis Date    BPH with obstruction/lower urinary tract symptoms     Cancer     prostate    Carcinoma of appendix     Coronary artery disease     Elevated PSA     Gilbert's disease 2021    Hyperlipidemia     Hypertension     MI (myocardial infarction)     Primary appendiceal adenocarcinoma     Stented coronary artery 2005    RCA     Social History     Socioeconomic History    Marital status:    Tobacco Use    Smoking status: Former     Types: Cigarettes     Quit date: 1991     Years since quittin.8    Smokeless tobacco: Never    Tobacco comments:     cigars and pipe   Substance and Sexual Activity    Alcohol use: Not Currently    Drug use: Not Currently         Objective    Physical Exam     Vitals:    23 0915   BP: (!) 143/74   Pulse: 61   Resp: 16   Temp: 97.5 °F (36.4 °C)     Awake alert no acute distress  Normocephalic atraumatic pupils equal round reactive  Normal respiratory effort  Normal rate  Distal pulses intact  Peripheral edema  Grossly cranial nerves 2-12 grossly intact sensorimotor grossly intact  Patient ambulatory on his own power        [facility]  Sodium   Date  Value Ref Range Status   04/21/2023 139 136 - 145 mmol/L Final     Potassium   Date Value Ref Range Status   04/21/2023 4.3 3.5 - 5.1 mmol/L Final     Chloride   Date Value Ref Range Status   04/21/2023 107 95 - 110 mmol/L Final     CO2   Date Value Ref Range Status   04/21/2023 25 23 - 29 mmol/L Final     Glucose   Date Value Ref Range Status   04/21/2023 94 70 - 110 mg/dL Final     BUN   Date Value Ref Range Status   04/21/2023 18 8 - 23 mg/dL Final     Creatinine   Date Value Ref Range Status   04/21/2023 1.0 0.5 - 1.4 mg/dL Final     Calcium   Date Value Ref Range Status   04/21/2023 8.7 8.7 - 10.5 mg/dL Final     Total Protein   Date Value Ref Range Status   04/21/2023 6.4 6.0 - 8.4 g/dL Final     Albumin   Date Value Ref Range Status   04/21/2023 3.8 3.5 - 5.2 g/dL Final     Total Bilirubin   Date Value Ref Range Status   04/21/2023 1.7 (H) 0.1 - 1.0 mg/dL Final     Comment:     For infants and newborns, interpretation of results should be based  on gestational age, weight and in agreement with clinical  observations.    Premature Infant recommended reference ranges:  Up to 24 hours.............<8.0 mg/dL  Up to 48 hours............<12.0 mg/dL  3-5 days..................<15.0 mg/dL  6-29 days.................<15.0 mg/dL       Alkaline Phosphatase   Date Value Ref Range Status   04/21/2023 53 (L) 55 - 135 U/L Final     AST   Date Value Ref Range Status   04/21/2023 21 10 - 40 U/L Final     ALT   Date Value Ref Range Status   04/21/2023 31 10 - 44 U/L Final     Anion Gap   Date Value Ref Range Status   04/21/2023 7 (L) 8 - 16 mmol/L Final     eGFR if    Date Value Ref Range Status   07/08/2022 >60 >60 mL/min/1.73 m^2 Final     eGFR if non    Date Value Ref Range Status   07/08/2022 >60 >60 mL/min/1.73 m^2 Final     Comment:     Calculation used to obtain the estimated glomerular filtration  rate (eGFR) is the CKD-EPI equation.        Lab Results   Component Value Date    WBC 7.65  04/21/2023    HGB 14.0 04/21/2023    HCT 40.9 04/21/2023    MCV 92 04/21/2023     04/21/2023 05/06/2021 surgical specimen  FINAL DIAGNOSIS:   05/10/2021 JAR/jr     ILEUM AND CECUM, ILEOCECETOMY   --APPENDIX WITH INVASIVE MODERATELY DIFFERENTIATED ADENOCARCINOMA.   --ASSOCIATED WITH ACUTE APPENDICITIS WITH RUPTURE.   --FIVE BENIGN LYMPH NODES.   --BACKGROUND COLON AND SMALL INTESTINE WITH SEROSAL ADHESIONS.   --SEE TUMOR SYNOPTIC REPORT.     Comment:   Additional dissection revealed one additional benign lymph node.     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 Sundar Garcia.  Unable to preform additional surgery due to patient co-morbility     With evidence of a rupture consider microscopic tumor deposit as a high risk factor.    Patient is 77 years old with comorbidities such as a previous diagnosis of MI as well as recent  TURP    Gilbert syndrome    CEA 2.2 one year post surgery resection     B12 146    Stable H/H    > Completed capecitabine therapy 10 cycles.  Capecitabine monotherapy 1000mg/m2 b.i.d. (from 1250mg/m2 due to underlying liver condition, age and comorbidities) daily 2 weeks on 1 week off Will start NCCN surveillance guidelines monitor.    > Rupture high risk factor will have to consider microscopic tumor deposit.   However given patient's age and comorbidity it is unlikely that he will tolerate any aggressive measure.  I have spoken to surgical team, reported patient cannot tolerate additional surgery.    > review CT scan with patient today.  1.3 cm left kidney appear to be cystic.  Enlarged prostate.  Patient will follow up with Urology.    > stable CEA.  Patient should have repeat colonoscopy this year    > s/p gallbladder infection, elevated transaminitis, cholecystectomy on 10/11/2021.  Follow up with surgery    > start on B12 daily with good respond will continue     > colonoscopy 02/28/2023 normal examination.  Anastomosis appear to be intact.  No evidence of polyps.  Repeat colonoscopy is not recommended for surveillance at this time. Dr Griffin Leonid    > RTC 4 month with lab

## 2023-07-21 ENCOUNTER — LAB VISIT (OUTPATIENT)
Dept: LAB | Facility: HOSPITAL | Age: 78
End: 2023-07-21
Attending: INTERNAL MEDICINE
Payer: MEDICARE

## 2023-07-21 DIAGNOSIS — R74.01 ELEVATED TRANSAMINASE LEVEL: ICD-10-CM

## 2023-07-21 DIAGNOSIS — E80.4 GILBERT'S SYNDROME: ICD-10-CM

## 2023-07-21 DIAGNOSIS — C18.1 CARCINOMA OF APPENDIX: ICD-10-CM

## 2023-07-21 LAB
ALBUMIN SERPL BCP-MCNC: 3.8 G/DL (ref 3.5–5.2)
ALP SERPL-CCNC: 51 U/L (ref 55–135)
ALT SERPL W/O P-5'-P-CCNC: 33 U/L (ref 10–44)
ANION GAP SERPL CALC-SCNC: 10 MMOL/L (ref 8–16)
AST SERPL-CCNC: 30 U/L (ref 10–40)
BASOPHILS # BLD AUTO: 0.06 K/UL (ref 0–0.2)
BASOPHILS NFR BLD: 0.9 % (ref 0–1.9)
BILIRUB SERPL-MCNC: 1.9 MG/DL (ref 0.1–1)
BUN SERPL-MCNC: 16 MG/DL (ref 8–23)
CALCIUM SERPL-MCNC: 8.5 MG/DL (ref 8.7–10.5)
CEA SERPL-MCNC: 2.7 NG/ML (ref 0–5)
CHLORIDE SERPL-SCNC: 107 MMOL/L (ref 95–110)
CO2 SERPL-SCNC: 22 MMOL/L (ref 23–29)
CREAT SERPL-MCNC: 0.9 MG/DL (ref 0.5–1.4)
DIFFERENTIAL METHOD: ABNORMAL
EOSINOPHIL # BLD AUTO: 0.1 K/UL (ref 0–0.5)
EOSINOPHIL NFR BLD: 1.8 % (ref 0–8)
ERYTHROCYTE [DISTWIDTH] IN BLOOD BY AUTOMATED COUNT: 12.5 % (ref 11.5–14.5)
EST. GFR  (NO RACE VARIABLE): >60 ML/MIN/1.73 M^2
GLUCOSE SERPL-MCNC: 126 MG/DL (ref 70–110)
HCT VFR BLD AUTO: 40.3 % (ref 40–54)
HGB BLD-MCNC: 13.5 G/DL (ref 14–18)
IMM GRANULOCYTES # BLD AUTO: 0.03 K/UL (ref 0–0.04)
IMM GRANULOCYTES NFR BLD AUTO: 0.5 % (ref 0–0.5)
LYMPHOCYTES # BLD AUTO: 1.6 K/UL (ref 1–4.8)
LYMPHOCYTES NFR BLD: 24.1 % (ref 18–48)
MAGNESIUM SERPL-MCNC: 1.9 MG/DL (ref 1.6–2.6)
MCH RBC QN AUTO: 30.5 PG (ref 27–31)
MCHC RBC AUTO-ENTMCNC: 33.5 G/DL (ref 32–36)
MCV RBC AUTO: 91 FL (ref 82–98)
MONOCYTES # BLD AUTO: 0.6 K/UL (ref 0.3–1)
MONOCYTES NFR BLD: 9.1 % (ref 4–15)
NEUTROPHILS # BLD AUTO: 4.1 K/UL (ref 1.8–7.7)
NEUTROPHILS NFR BLD: 63.6 % (ref 38–73)
NRBC BLD-RTO: 0 /100 WBC
PLATELET # BLD AUTO: 199 K/UL (ref 150–450)
PMV BLD AUTO: 9.7 FL (ref 9.2–12.9)
POTASSIUM SERPL-SCNC: 4.2 MMOL/L (ref 3.5–5.1)
PROT SERPL-MCNC: 6.4 G/DL (ref 6–8.4)
RBC # BLD AUTO: 4.42 M/UL (ref 4.6–6.2)
SODIUM SERPL-SCNC: 139 MMOL/L (ref 136–145)
WBC # BLD AUTO: 6.51 K/UL (ref 3.9–12.7)

## 2023-07-21 PROCEDURE — 36415 COLL VENOUS BLD VENIPUNCTURE: CPT | Performed by: INTERNAL MEDICINE

## 2023-07-21 PROCEDURE — 82378 CARCINOEMBRYONIC ANTIGEN: CPT | Performed by: INTERNAL MEDICINE

## 2023-07-21 PROCEDURE — 80053 COMPREHEN METABOLIC PANEL: CPT | Performed by: INTERNAL MEDICINE

## 2023-07-21 PROCEDURE — 85025 COMPLETE CBC W/AUTO DIFF WBC: CPT | Performed by: INTERNAL MEDICINE

## 2023-07-21 PROCEDURE — 83735 ASSAY OF MAGNESIUM: CPT | Performed by: INTERNAL MEDICINE

## 2023-07-24 ENCOUNTER — OFFICE VISIT (OUTPATIENT)
Dept: HEMATOLOGY/ONCOLOGY | Facility: CLINIC | Age: 78
End: 2023-07-24
Payer: MEDICARE

## 2023-07-24 VITALS
HEART RATE: 62 BPM | SYSTOLIC BLOOD PRESSURE: 151 MMHG | RESPIRATION RATE: 14 BRPM | BODY MASS INDEX: 35.84 KG/M2 | OXYGEN SATURATION: 97 % | HEIGHT: 67 IN | WEIGHT: 228.38 LBS | TEMPERATURE: 98 F | DIASTOLIC BLOOD PRESSURE: 79 MMHG

## 2023-07-24 DIAGNOSIS — R74.01 ELEVATED TRANSAMINASE LEVEL: ICD-10-CM

## 2023-07-24 DIAGNOSIS — E53.8 B12 DEFICIENCY: Primary | ICD-10-CM

## 2023-07-24 DIAGNOSIS — E80.4 GILBERT'S SYNDROME: ICD-10-CM

## 2023-07-24 DIAGNOSIS — C18.1 CARCINOMA OF APPENDIX: ICD-10-CM

## 2023-07-24 PROCEDURE — 99213 OFFICE O/P EST LOW 20 MIN: CPT | Mod: PBBFAC,PN | Performed by: INTERNAL MEDICINE

## 2023-07-24 PROCEDURE — 99214 PR OFFICE/OUTPT VISIT, EST, LEVL IV, 30-39 MIN: ICD-10-PCS | Mod: S$PBB,,, | Performed by: INTERNAL MEDICINE

## 2023-07-24 PROCEDURE — 99999 PR PBB SHADOW E&M-EST. PATIENT-LVL III: CPT | Mod: PBBFAC,,, | Performed by: INTERNAL MEDICINE

## 2023-07-24 PROCEDURE — 99999 PR PBB SHADOW E&M-EST. PATIENT-LVL III: ICD-10-PCS | Mod: PBBFAC,,, | Performed by: INTERNAL MEDICINE

## 2023-07-24 PROCEDURE — 99214 OFFICE O/P EST MOD 30 MIN: CPT | Mod: S$PBB,,, | Performed by: INTERNAL MEDICINE

## 2023-07-24 NOTE — PROGRESS NOTES
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  History of benign prostate hypertrophy with obstructive urine output.  Status post TURP by Dr Craig Reza on 21    History of prostate cancer.  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  Associated with acute appendicitis with a rupture.  Five benign lymph nodes retrieved.    Tumor size 0.8 cm.  Histological type adenocarcinoma.  Histologic  Grade moderately    Differentiated.  Tumor invades muscular peripheral.  Margin negative.  Tumor deposit negative.  Lymphovascular invasion negative.  Perineural invasion    Negative.  Pathological staging [id number]    On keep side being 1500mg b.i.d. daily 2 weeks on 1 week off    Past Medical History:   Diagnosis Date    BPH with obstruction/lower urinary tract symptoms     Cancer     prostate    Carcinoma of appendix     Coronary artery disease     Elevated PSA     Gilbert's disease 2021    Hyperlipidemia     Hypertension     MI (myocardial infarction)     Primary appendiceal adenocarcinoma     Stented coronary artery 2005    RCA     Social History     Socioeconomic History    Marital status:    Tobacco Use    Smoking status: Former     Types: Cigarettes     Quit date: 1991     Years since quittin.1    Smokeless tobacco: Never    Tobacco comments:     cigars and pipe   Substance and Sexual Activity    Alcohol use: Not Currently    Drug use: Not Currently         Objective    Physical Exam     Vitals:    23 0911   BP: (!) 151/79   Pulse: 62   Resp: 14   Temp: 98.1 °F (36.7 °C)     Awake alert no acute distress  Normocephalic atraumatic pupils equal round reactive  Normal respiratory effort  Normal rate  Distal pulses intact  Peripheral edema  Grossly cranial nerves 2-12 grossly intact sensorimotor grossly intact  Patient ambulatory on his own power        [facility]  Sodium   Date  Value Ref Range Status   07/21/2023 139 136 - 145 mmol/L Final     Potassium   Date Value Ref Range Status   07/21/2023 4.2 3.5 - 5.1 mmol/L Final     Chloride   Date Value Ref Range Status   07/21/2023 107 95 - 110 mmol/L Final     CO2   Date Value Ref Range Status   07/21/2023 22 (L) 23 - 29 mmol/L Final     Glucose   Date Value Ref Range Status   07/21/2023 126 (H) 70 - 110 mg/dL Final     BUN   Date Value Ref Range Status   07/21/2023 16 8 - 23 mg/dL Final     Creatinine   Date Value Ref Range Status   07/21/2023 0.9 0.5 - 1.4 mg/dL Final     Calcium   Date Value Ref Range Status   07/21/2023 8.5 (L) 8.7 - 10.5 mg/dL Final     Total Protein   Date Value Ref Range Status   07/21/2023 6.4 6.0 - 8.4 g/dL Final     Albumin   Date Value Ref Range Status   07/21/2023 3.8 3.5 - 5.2 g/dL Final     Total Bilirubin   Date Value Ref Range Status   07/21/2023 1.9 (H) 0.1 - 1.0 mg/dL Final     Comment:     For infants and newborns, interpretation of results should be based  on gestational age, weight and in agreement with clinical  observations.    Premature Infant recommended reference ranges:  Up to 24 hours.............<8.0 mg/dL  Up to 48 hours............<12.0 mg/dL  3-5 days..................<15.0 mg/dL  6-29 days.................<15.0 mg/dL       Alkaline Phosphatase   Date Value Ref Range Status   07/21/2023 51 (L) 55 - 135 U/L Final     AST   Date Value Ref Range Status   07/21/2023 30 10 - 40 U/L Final     ALT   Date Value Ref Range Status   07/21/2023 33 10 - 44 U/L Final     Anion Gap   Date Value Ref Range Status   07/21/2023 10 8 - 16 mmol/L Final     eGFR if    Date Value Ref Range Status   07/08/2022 >60 >60 mL/min/1.73 m^2 Final     eGFR if non    Date Value Ref Range Status   07/08/2022 >60 >60 mL/min/1.73 m^2 Final     Comment:     Calculation used to obtain the estimated glomerular filtration  rate (eGFR) is the CKD-EPI equation.        Lab Results   Component Value Date     WBC 6.51 07/21/2023    HGB 13.5 (L) 07/21/2023    HCT 40.3 07/21/2023    MCV 91 07/21/2023     07/21/2023 05/06/2021 surgical specimen  FINAL DIAGNOSIS:   05/10/2021 JAR/jr     ILEUM AND CECUM, ILEOCECETOMY   --APPENDIX WITH INVASIVE MODERATELY DIFFERENTIATED ADENOCARCINOMA.   --ASSOCIATED WITH ACUTE APPENDICITIS WITH RUPTURE.   --FIVE BENIGN LYMPH NODES.   --BACKGROUND COLON AND SMALL INTESTINE WITH SEROSAL ADHESIONS.   --SEE TUMOR SYNOPTIC REPORT.     Comment:   Additional dissection revealed one additional benign lymph node.     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 Sundar Garcia.  Unable to preform additional surgery due to patient co-morbility     With evidence of a rupture consider microscopic tumor deposit as a high risk factor.    Patient is 77 years old with comorbidities such as a previous diagnosis of MI as well as recent  TURP    Gilbert syndrome    CEA 2.2 one year post surgery resection     B12 146    Stable H/H    > Completed capecitabine therapy 10 cycles.  Capecitabine monotherapy 1000mg/m2 b.i.d. (from 1250mg/m2 due to underlying liver condition, age and comorbidities) daily 2 weeks on 1 week off Will start NCCN surveillance guidelines monitor.    > Rupture high risk factor will have to consider microscopic tumor  deposit.  However given patient's age and comorbidity it is unlikely that he will tolerate any aggressive measure.  I have spoken to surgical team, reported patient cannot tolerate additional surgery.    > review CT scan with patient today.  1.3 cm left kidney appear to be cystic.  Enlarged prostate.  Patient will follow up with Urology.    > s/p gallbladder infection, elevated transaminitis, cholecystectomy on 10/11/2021.  Follow up with surgery    > start on B12 daily with good respond will continue     > colonoscopy 02/28/2023 normal examination.  Anastomosis appear to be intact.  No evidence of polyps.  Repeat colonoscopy is not recommended for surveillance at this time. Dr Elias Jaquez    > RTC 4 month with lab

## 2023-11-09 ENCOUNTER — HOSPITAL ENCOUNTER (INPATIENT)
Facility: HOSPITAL | Age: 78
LOS: 3 days | Discharge: HOME | DRG: 696 | End: 2023-11-13
Attending: EMERGENCY MEDICINE | Admitting: INTERNAL MEDICINE
Payer: MEDICARE

## 2023-11-09 ENCOUNTER — OFFICE VISIT (OUTPATIENT)
Dept: UROLOGY | Facility: CLINIC | Age: 78
End: 2023-11-09
Payer: MEDICARE

## 2023-11-09 ENCOUNTER — TELEPHONE (OUTPATIENT)
Dept: UROLOGY | Facility: CLINIC | Age: 78
End: 2023-11-09

## 2023-11-09 VITALS
SYSTOLIC BLOOD PRESSURE: 166 MMHG | HEIGHT: 67 IN | RESPIRATION RATE: 18 BRPM | WEIGHT: 228.38 LBS | HEART RATE: 78 BPM | BODY MASS INDEX: 35.84 KG/M2 | DIASTOLIC BLOOD PRESSURE: 92 MMHG

## 2023-11-09 DIAGNOSIS — R33.9 URINARY RETENTION: ICD-10-CM

## 2023-11-09 DIAGNOSIS — R31.0 GROSS HEMATURIA: Primary | ICD-10-CM

## 2023-11-09 DIAGNOSIS — R33.8 BENIGN PROSTATIC HYPERPLASIA WITH URINARY RETENTION: ICD-10-CM

## 2023-11-09 DIAGNOSIS — N40.1 BENIGN PROSTATIC HYPERPLASIA WITH URINARY RETENTION: ICD-10-CM

## 2023-11-09 DIAGNOSIS — I95.9 HYPOTENSION: ICD-10-CM

## 2023-11-09 DIAGNOSIS — R31.0 GROSS HEMATURIA: Primary | Chronic | ICD-10-CM

## 2023-11-09 DIAGNOSIS — R55 VASOVAGAL EPISODE: ICD-10-CM

## 2023-11-09 DIAGNOSIS — R07.9 CHEST PAIN: ICD-10-CM

## 2023-11-09 LAB
ABO + RH BLD: NORMAL
ALBUMIN SERPL BCP-MCNC: 3.7 G/DL (ref 3.5–5.2)
ALP SERPL-CCNC: 51 U/L (ref 55–135)
ALT SERPL W/O P-5'-P-CCNC: 30 U/L (ref 10–44)
ANION GAP SERPL CALC-SCNC: 9 MMOL/L (ref 8–16)
AST SERPL-CCNC: 23 U/L (ref 10–40)
BASOPHILS # BLD AUTO: 0.05 K/UL (ref 0–0.2)
BASOPHILS # BLD AUTO: 0.09 K/UL (ref 0–0.2)
BASOPHILS NFR BLD: 0.4 % (ref 0–1.9)
BASOPHILS NFR BLD: 0.7 % (ref 0–1.9)
BILIRUB SERPL-MCNC: 2.1 MG/DL (ref 0.1–1)
BLD GP AB SCN CELLS X3 SERPL QL: NORMAL
BLD PROD TYP BPU: NORMAL
BLOOD UNIT EXPIRATION DATE: NORMAL
BLOOD UNIT TYPE CODE: 5100
BLOOD UNIT TYPE: NORMAL
BUN SERPL-MCNC: 21 MG/DL (ref 8–23)
CALCIUM SERPL-MCNC: 8.3 MG/DL (ref 8.7–10.5)
CHLORIDE SERPL-SCNC: 103 MMOL/L (ref 95–110)
CO2 SERPL-SCNC: 22 MMOL/L (ref 23–29)
CODING SYSTEM: NORMAL
CREAT SERPL-MCNC: 1 MG/DL (ref 0.5–1.4)
CROSSMATCH INTERPRETATION: NORMAL
DIFFERENTIAL METHOD: ABNORMAL
DIFFERENTIAL METHOD: ABNORMAL
DISPENSE STATUS: NORMAL
EOSINOPHIL # BLD AUTO: 0 K/UL (ref 0–0.5)
EOSINOPHIL # BLD AUTO: 0 K/UL (ref 0–0.5)
EOSINOPHIL NFR BLD: 0 % (ref 0–8)
EOSINOPHIL NFR BLD: 0.3 % (ref 0–8)
ERYTHROCYTE [DISTWIDTH] IN BLOOD BY AUTOMATED COUNT: 12.4 % (ref 11.5–14.5)
ERYTHROCYTE [DISTWIDTH] IN BLOOD BY AUTOMATED COUNT: 12.4 % (ref 11.5–14.5)
EST. GFR  (NO RACE VARIABLE): >60 ML/MIN/1.73 M^2
GLUCOSE SERPL-MCNC: 158 MG/DL (ref 70–110)
HCT VFR BLD AUTO: 28.7 % (ref 40–54)
HCT VFR BLD AUTO: 37 % (ref 40–54)
HGB BLD-MCNC: 12.6 G/DL (ref 14–18)
HGB BLD-MCNC: 9.7 G/DL (ref 14–18)
IMM GRANULOCYTES # BLD AUTO: 0.05 K/UL (ref 0–0.04)
IMM GRANULOCYTES # BLD AUTO: 0.08 K/UL (ref 0–0.04)
IMM GRANULOCYTES NFR BLD AUTO: 0.4 % (ref 0–0.5)
IMM GRANULOCYTES NFR BLD AUTO: 0.6 % (ref 0–0.5)
LYMPHOCYTES # BLD AUTO: 0.9 K/UL (ref 1–4.8)
LYMPHOCYTES # BLD AUTO: 1.2 K/UL (ref 1–4.8)
LYMPHOCYTES NFR BLD: 7.5 % (ref 18–48)
LYMPHOCYTES NFR BLD: 8.6 % (ref 18–48)
MCH RBC QN AUTO: 31.2 PG (ref 27–31)
MCH RBC QN AUTO: 31.5 PG (ref 27–31)
MCHC RBC AUTO-ENTMCNC: 33.8 G/DL (ref 32–36)
MCHC RBC AUTO-ENTMCNC: 34.1 G/DL (ref 32–36)
MCV RBC AUTO: 92 FL (ref 82–98)
MCV RBC AUTO: 93 FL (ref 82–98)
MONOCYTES # BLD AUTO: 0.5 K/UL (ref 0.3–1)
MONOCYTES # BLD AUTO: 0.6 K/UL (ref 0.3–1)
MONOCYTES NFR BLD: 4.2 % (ref 4–15)
MONOCYTES NFR BLD: 4.5 % (ref 4–15)
NEUTROPHILS # BLD AUTO: 10.9 K/UL (ref 1.8–7.7)
NEUTROPHILS # BLD AUTO: 11.5 K/UL (ref 1.8–7.7)
NEUTROPHILS NFR BLD: 85.5 % (ref 38–73)
NEUTROPHILS NFR BLD: 87.3 % (ref 38–73)
NRBC BLD-RTO: 0 /100 WBC
NRBC BLD-RTO: 0 /100 WBC
NUM UNITS TRANS PACKED RBC: NORMAL
PLATELET # BLD AUTO: 185 K/UL (ref 150–450)
PLATELET # BLD AUTO: 227 K/UL (ref 150–450)
PMV BLD AUTO: 10 FL (ref 9.2–12.9)
PMV BLD AUTO: 10.1 FL (ref 9.2–12.9)
POTASSIUM SERPL-SCNC: 4.7 MMOL/L (ref 3.5–5.1)
PROT SERPL-MCNC: 6.2 G/DL (ref 6–8.4)
RBC # BLD AUTO: 3.08 M/UL (ref 4.6–6.2)
RBC # BLD AUTO: 4.04 M/UL (ref 4.6–6.2)
SODIUM SERPL-SCNC: 134 MMOL/L (ref 136–145)
SPECIMEN OUTDATE: NORMAL
WBC # BLD AUTO: 12.48 K/UL (ref 3.9–12.7)
WBC # BLD AUTO: 13.38 K/UL (ref 3.9–12.7)

## 2023-11-09 PROCEDURE — 99214 OFFICE O/P EST MOD 30 MIN: CPT | Mod: S$PBB,,, | Performed by: NURSE PRACTITIONER

## 2023-11-09 PROCEDURE — 36430 TRANSFUSION BLD/BLD COMPNT: CPT

## 2023-11-09 PROCEDURE — 88112 CYTOPATH CELL ENHANCE TECH: CPT | Mod: 26,,, | Performed by: STUDENT IN AN ORGANIZED HEALTH CARE EDUCATION/TRAINING PROGRAM

## 2023-11-09 PROCEDURE — 86920 COMPATIBILITY TEST SPIN: CPT | Performed by: EMERGENCY MEDICINE

## 2023-11-09 PROCEDURE — 63600175 PHARM REV CODE 636 W HCPCS: Performed by: PHYSICIAN ASSISTANT

## 2023-11-09 PROCEDURE — 86850 RBC ANTIBODY SCREEN: CPT | Performed by: EMERGENCY MEDICINE

## 2023-11-09 PROCEDURE — 96361 HYDRATE IV INFUSION ADD-ON: CPT

## 2023-11-09 PROCEDURE — 80053 COMPREHEN METABOLIC PANEL: CPT | Performed by: PHYSICIAN ASSISTANT

## 2023-11-09 PROCEDURE — 85025 COMPLETE CBC W/AUTO DIFF WBC: CPT | Mod: 91 | Performed by: EMERGENCY MEDICINE

## 2023-11-09 PROCEDURE — 85025 COMPLETE CBC W/AUTO DIFF WBC: CPT | Performed by: PHYSICIAN ASSISTANT

## 2023-11-09 PROCEDURE — 51798 US URINE CAPACITY MEASURE: CPT

## 2023-11-09 PROCEDURE — 25000003 PHARM REV CODE 250: Performed by: EMERGENCY MEDICINE

## 2023-11-09 PROCEDURE — 51702 INSERT TEMP BLADDER CATH: CPT

## 2023-11-09 PROCEDURE — 88112 CYTOPATH CELL ENHANCE TECH: CPT | Performed by: STUDENT IN AN ORGANIZED HEALTH CARE EDUCATION/TRAINING PROGRAM

## 2023-11-09 PROCEDURE — 93005 ELECTROCARDIOGRAM TRACING: CPT

## 2023-11-09 PROCEDURE — 99999 PR PBB SHADOW E&M-EST. PATIENT-LVL IV: ICD-10-PCS | Mod: PBBFAC,,, | Performed by: NURSE PRACTITIONER

## 2023-11-09 PROCEDURE — G0378 HOSPITAL OBSERVATION PER HR: HCPCS

## 2023-11-09 PROCEDURE — 99214 OFFICE O/P EST MOD 30 MIN: CPT | Mod: PBBFAC,PO | Performed by: NURSE PRACTITIONER

## 2023-11-09 PROCEDURE — 36415 COLL VENOUS BLD VENIPUNCTURE: CPT | Performed by: EMERGENCY MEDICINE

## 2023-11-09 PROCEDURE — 88112 PR  CYTOPATH, CELL ENHANCE TECH: ICD-10-PCS | Mod: 26,,, | Performed by: STUDENT IN AN ORGANIZED HEALTH CARE EDUCATION/TRAINING PROGRAM

## 2023-11-09 PROCEDURE — 99999 PR PBB SHADOW E&M-EST. PATIENT-LVL IV: CPT | Mod: PBBFAC,,, | Performed by: NURSE PRACTITIONER

## 2023-11-09 PROCEDURE — 87086 URINE CULTURE/COLONY COUNT: CPT | Performed by: NURSE PRACTITIONER

## 2023-11-09 PROCEDURE — 99285 EMERGENCY DEPT VISIT HI MDM: CPT | Mod: 25

## 2023-11-09 PROCEDURE — 93010 ELECTROCARDIOGRAM REPORT: CPT | Mod: ,,, | Performed by: GENERAL PRACTICE

## 2023-11-09 PROCEDURE — 25000003 PHARM REV CODE 250: Performed by: PHYSICIAN ASSISTANT

## 2023-11-09 PROCEDURE — 96365 THER/PROPH/DIAG IV INF INIT: CPT

## 2023-11-09 PROCEDURE — P9016 RBC LEUKOCYTES REDUCED: HCPCS | Performed by: EMERGENCY MEDICINE

## 2023-11-09 PROCEDURE — 93010 EKG 12-LEAD: ICD-10-PCS | Mod: ,,, | Performed by: GENERAL PRACTICE

## 2023-11-09 PROCEDURE — 99214 PR OFFICE/OUTPT VISIT, EST, LEVL IV, 30-39 MIN: ICD-10-PCS | Mod: S$PBB,,, | Performed by: NURSE PRACTITIONER

## 2023-11-09 PROCEDURE — 36415 COLL VENOUS BLD VENIPUNCTURE: CPT | Performed by: PHYSICIAN ASSISTANT

## 2023-11-09 RX ORDER — NITROFURANTOIN 25; 75 MG/1; MG/1
100 CAPSULE ORAL EVERY 12 HOURS
Status: DISCONTINUED | OUTPATIENT
Start: 2023-11-09 | End: 2023-11-09

## 2023-11-09 RX ORDER — TAMSULOSIN HYDROCHLORIDE 0.4 MG/1
0.4 CAPSULE ORAL
Status: COMPLETED | OUTPATIENT
Start: 2023-11-09 | End: 2023-11-09

## 2023-11-09 RX ORDER — TAMSULOSIN HYDROCHLORIDE 0.4 MG/1
0.4 CAPSULE ORAL DAILY
Qty: 30 CAPSULE | Refills: 3 | Status: SHIPPED | OUTPATIENT
Start: 2023-11-09 | End: 2024-11-08

## 2023-11-09 RX ORDER — NITROFURANTOIN 25; 75 MG/1; MG/1
100 CAPSULE ORAL 2 TIMES DAILY
Qty: 28 CAPSULE | Refills: 0 | Status: SHIPPED | OUTPATIENT
Start: 2023-11-09 | End: 2023-11-23

## 2023-11-09 RX ORDER — HYDROCODONE BITARTRATE AND ACETAMINOPHEN 500; 5 MG/1; MG/1
TABLET ORAL
Status: DISCONTINUED | OUTPATIENT
Start: 2023-11-09 | End: 2023-11-13 | Stop reason: HOSPADM

## 2023-11-09 RX ADMIN — CEFTRIAXONE SODIUM 1 G: 1 INJECTION, POWDER, FOR SOLUTION INTRAMUSCULAR; INTRAVENOUS at 08:11

## 2023-11-09 RX ADMIN — SODIUM CHLORIDE 1000 ML: 0.9 INJECTION, SOLUTION INTRAVENOUS at 10:11

## 2023-11-09 RX ADMIN — SODIUM CHLORIDE 500 ML: 9 INJECTION, SOLUTION INTRAVENOUS at 06:11

## 2023-11-09 RX ADMIN — TAMSULOSIN HYDROCHLORIDE 0.4 MG: 0.4 CAPSULE ORAL at 06:11

## 2023-11-09 NOTE — PROGRESS NOTES
[facility] Note  Staff: ARLENE Lord    PCP: CESIA Perry.  Urologist:  ABDOUL Reza    Chief Complaint: Painless Gross Hematuria-new onset    Subjective:        HPI: Ryan Stewart is a 78 y.o. male presents with new onset gross hematuria started yesterday.  Therefore pt is here today for evaluation.  Pt states this is the 2nd time in the past 2 mos this has occurred.  Pt denies dysuria or problems with urination at this time.    Pt was last evaluated by Dr. Reza on 2/22/23 for LUTS.  Patient with a history of elevated PSA, gross hematuria and enlarged prostate previously managed with Dr. Quintanilla. He presented to [facility] ED with urinary retention after appendectomy on 5/6/21 for ruptured appendicitis. 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     Alexis catheter placed with 1.5L urine. CT renal stone on 5/17/21 revealed a significantly enlarged prostate.  He underwent voiding trial in clinic with NP Eliana Baez, but had the alexis replaced. He has since developed gross hematuria. He states that prior to his appendectomy he was voiding well. He has been on Flomax for several years. Now on Finasteride.     On review of records, he has a history of elevated PSA s/p biopsy in [place] in 2002. Declined any further biopsies. He is on Plavix for history of MI.     Interval History  7/22/21: Patient with a 170 cc prostate s/p TURP on 6/22/21. Pathology negative. Discharged home POD 2. Underwent successful voiding trial 1 week post-op. States his lower urinary tract symptoms have improved significantly. Remains on Flomax and Finasteride.      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      1/21/22: Patient is doing well. Continues to void fine with mild lower urinary tract symptoms. IPSS 4. QoL 0. PVR 26 mL. Discontinued Flomax and Finasteride.     2/22/23: Here for follow up. Doing well with no complaints. IPSS remains at 4. QoL 0. PVR 71 mL.      He underwent  CT abdomen pelvis with contrast which showed an enlarged prostate, 13 mm lesion on the left kidney - not changed in size and fatty liver.      Denies any fever, chills, flank pain, nephrolithiasis,  trauma or history of  malignancy.      PSA  5.3                   11/3/20  4.7                   8/19/19  5.3                   9/4/18  6.0                   7/8/16  8.9                   9/4/13     Urine culture  Enterococcus  6/2/21  Enterococcus  5/6/21     IPSS    QoL  4          0          2/22/23  6          1          7/22/21     PVR  71 mL              2/22/23  26 mL              1/21/22  0 mL                7/22/21  109 mL            6/29/21     REVIEW OF SYSTEMS:  A comprehensive 10 system review was performed and is negative except as noted above in HPI    PMHx:  Past Medical History:   Diagnosis Date    BPH with obstruction/lower urinary tract symptoms     Cancer     prostate    Carcinoma of appendix     Coronary artery disease     Elevated PSA     Gilbert's disease 09/28/2021    Hyperlipidemia     Hypertension     MI (myocardial infarction) 2005    Primary appendiceal adenocarcinoma     Stented coronary artery 2005    RCA     PSHx:  Past Surgical History:   Procedure Laterality Date    APPENDECTOMY      CARDIAC SURGERY      stent coronary    COLON SURGERY      CYSTOSCOPY      CYSTOSCOPY N/A 6/2/2021    Procedure: CYSTOSCOPY;  Surgeon: Craig Reza Jr., MD;  Location: [facility] OR;  Service: Urology;  Laterality: N/A;    EAR EXAMINATION UNDER ANESTHESIA      LAPAROSCOPIC APPENDECTOMY N/A 5/6/2021    Procedure: APPENDECTOMY, LAPAROSCOPIC;  Surgeon: Sundar Garcia MD;  Location: [facility] OR;  Service: General;  Laterality: N/A;    LAPAROSCOPIC CHOLECYSTECTOMY N/A 10/10/2021    Procedure: CHOLECYSTECTOMY, LAPAROSCOPIC;  Surgeon: Eulogio Yusuf MD;  Location: [facility] OR;  Service: General;  Laterality: N/A;    SURGICAL REMOVAL OF ILEUM WITH CECUM  5/6/2021    Procedure: EXCISION, CECUM WITH ILEUM;  Surgeon: Sundar  ANDREA Garcia MD;  Location: [facility] OR;  Service: General;;    TRANSRECTAL ULTRASOUND EXAMINATION N/A 6/2/2021    Procedure: ULTRASOUND, RECTAL APPROACH;  Surgeon: Craig Reza Jr., MD;  Location: [facility] OR;  Service: Urology;  Laterality: N/A;    TRANSURETHRAL RESECTION OF PROSTATE N/A 6/22/2021    Procedure: TURP (TRANSURETHRAL RESECTION OF PROSTATE);  Surgeon: Craig Reza Jr., MD;  Location: [facility] OR;  Service: Urology;  Laterality: N/A;     Allergies:  Ciprofloxacin, Rapaflo [silodosin], and Simvastatin    Medications: reviewed   Objective:     Vitals:    11/09/23 1315   BP: (!) 166/92   Pulse: 78   Resp: 18     General:WDWN in NAD  Eyes: PERRLA, normal conjunctiva  Respiratory: no increased work on breathing, clear to auscultation  Cardiovascular: regular rate and rhythm. No obvious extremity edema.  GI: palpation of masses. No tenderness. No hepatosplenomegaly to palpation.  Musculoskeletal: normal range of motion of bilateral upper extremities. Normal muscle strength and tone.  Skin: no obvious rashes or lesions. No tightening of skin noted.  Neurologic: CN grossly normal. Normal sensation.   Psychiatric: awake, alert and oriented x 3. Mood and affect normal. Cooperative.    Assessment:       1. Gross hematuria          Plan:   New Onset GH:    Urine Culture and Urine Cytology to be processed today.  In the meantime, Macrobid 100 mg BID prescribed to pt (this was based on his past culture results)  CTU with and without contrast to be scheduled with serum cr.    Will forward notes to Dr. Reza to review for possible Cysto in the near future after imaging has been completed.  Pt verbalized understanding.    [facility]: N/A    Eliana Baez, FNP-C

## 2023-11-09 NOTE — TELEPHONE ENCOUNTER
Spoke with patient who states he can NOT urinate since he left his appointment at 2:00 TODAY. Informed Eliana. Per her she sent him a new RX of Flomax to his pharmacy for him to start taking.I informed him of new prescription. Advised him if he was not able to urinate he would have to go to the ER. Patient verbalized understanding

## 2023-11-09 NOTE — TELEPHONE ENCOUNTER
----- Message from Whitney Woods sent at 11/9/2023  2:55 PM CST -----  Type:  Needs Medical Advice    Who Called: Pt  Symptoms (please be specific):  Unable to void, pt states she has voided since his visit, only comes out in dribbles...    Best Call Back Number:  [phone]  Please call to advise... Thank you...

## 2023-11-10 PROBLEM — K56.0 ADYNAMIC ILEUS: Status: RESOLVED | Noted: 2021-05-07 | Resolved: 2023-11-10

## 2023-11-10 PROBLEM — E83.42 HYPOMAGNESEMIA: Status: RESOLVED | Noted: 2021-05-07 | Resolved: 2023-11-10

## 2023-11-10 PROBLEM — E83.51 HYPOCALCEMIA: Status: RESOLVED | Noted: 2021-05-06 | Resolved: 2023-11-10

## 2023-11-10 PROBLEM — E87.1 HYPONATREMIA: Status: RESOLVED | Noted: 2021-05-06 | Resolved: 2023-11-10

## 2023-11-10 PROBLEM — K81.0 ACUTE CHOLECYSTITIS: Status: RESOLVED | Noted: 2021-10-09 | Resolved: 2023-11-10

## 2023-11-10 PROBLEM — K35.32 ACUTE APPENDICITIS WITH PERFORATION, LOCALIZED PERITONITIS, AND GANGRENE, WITHOUT ABSCESS: Status: RESOLVED | Noted: 2021-05-06 | Resolved: 2023-11-10

## 2023-11-10 LAB
ALBUMIN SERPL BCP-MCNC: 3.1 G/DL (ref 3.5–5.2)
ALP SERPL-CCNC: 43 U/L (ref 55–135)
ALT SERPL W/O P-5'-P-CCNC: 21 U/L (ref 10–44)
ANION GAP SERPL CALC-SCNC: 9 MMOL/L (ref 8–16)
AST SERPL-CCNC: 16 U/L (ref 10–40)
BACTERIA UR CULT: NO GROWTH
BASOPHILS # BLD AUTO: 0.03 K/UL (ref 0–0.2)
BASOPHILS NFR BLD: 0.2 % (ref 0–1.9)
BILIRUB SERPL-MCNC: 2.2 MG/DL (ref 0.1–1)
BUN SERPL-MCNC: 25 MG/DL (ref 8–23)
CALCIUM SERPL-MCNC: 7.4 MG/DL (ref 8.7–10.5)
CHLORIDE SERPL-SCNC: 108 MMOL/L (ref 95–110)
CO2 SERPL-SCNC: 18 MMOL/L (ref 23–29)
CREAT SERPL-MCNC: 0.9 MG/DL (ref 0.5–1.4)
DIFFERENTIAL METHOD: ABNORMAL
EOSINOPHIL # BLD AUTO: 0 K/UL (ref 0–0.5)
EOSINOPHIL NFR BLD: 0 % (ref 0–8)
ERYTHROCYTE [DISTWIDTH] IN BLOOD BY AUTOMATED COUNT: 12.9 % (ref 11.5–14.5)
EST. GFR  (NO RACE VARIABLE): >60 ML/MIN/1.73 M^2
GLUCOSE SERPL-MCNC: 162 MG/DL (ref 70–110)
HCT VFR BLD AUTO: 26.6 % (ref 40–54)
HCT VFR BLD AUTO: 27.7 % (ref 40–54)
HCT VFR BLD AUTO: 30.8 % (ref 40–54)
HCT VFR BLD AUTO: 31 % (ref 40–54)
HGB BLD-MCNC: 10.7 G/DL (ref 14–18)
HGB BLD-MCNC: 10.7 G/DL (ref 14–18)
HGB BLD-MCNC: 8.9 G/DL (ref 14–18)
HGB BLD-MCNC: 9.6 G/DL (ref 14–18)
IMM GRANULOCYTES # BLD AUTO: 0.05 K/UL (ref 0–0.04)
IMM GRANULOCYTES NFR BLD AUTO: 0.4 % (ref 0–0.5)
LYMPHOCYTES # BLD AUTO: 1.3 K/UL (ref 1–4.8)
LYMPHOCYTES NFR BLD: 10.2 % (ref 18–48)
MAGNESIUM SERPL-MCNC: 1.7 MG/DL (ref 1.6–2.6)
MCH RBC QN AUTO: 31.4 PG (ref 27–31)
MCHC RBC AUTO-ENTMCNC: 34.7 G/DL (ref 32–36)
MCV RBC AUTO: 90 FL (ref 82–98)
MONOCYTES # BLD AUTO: 0.6 K/UL (ref 0.3–1)
MONOCYTES NFR BLD: 5.2 % (ref 4–15)
NEUTROPHILS # BLD AUTO: 10.4 K/UL (ref 1.8–7.7)
NEUTROPHILS NFR BLD: 84 % (ref 38–73)
NRBC BLD-RTO: 0 /100 WBC
PLATELET # BLD AUTO: 187 K/UL (ref 150–450)
PMV BLD AUTO: 10.5 FL (ref 9.2–12.9)
POTASSIUM SERPL-SCNC: 4.3 MMOL/L (ref 3.5–5.1)
PROT SERPL-MCNC: 5.3 G/DL (ref 6–8.4)
RBC # BLD AUTO: 3.41 M/UL (ref 4.6–6.2)
SODIUM SERPL-SCNC: 135 MMOL/L (ref 136–145)
WBC # BLD AUTO: 12.4 K/UL (ref 3.9–12.7)

## 2023-11-10 PROCEDURE — 83735 ASSAY OF MAGNESIUM: CPT | Performed by: NURSE PRACTITIONER

## 2023-11-10 PROCEDURE — 11000001 HC ACUTE MED/SURG PRIVATE ROOM

## 2023-11-10 PROCEDURE — 96366 THER/PROPH/DIAG IV INF ADDON: CPT

## 2023-11-10 PROCEDURE — 63600175 PHARM REV CODE 636 W HCPCS: Performed by: NURSE PRACTITIONER

## 2023-11-10 PROCEDURE — 99223 1ST HOSP IP/OBS HIGH 75: CPT | Mod: 25,,, | Performed by: STUDENT IN AN ORGANIZED HEALTH CARE EDUCATION/TRAINING PROGRAM

## 2023-11-10 PROCEDURE — 85018 HEMOGLOBIN: CPT | Performed by: NURSE PRACTITIONER

## 2023-11-10 PROCEDURE — 85014 HEMATOCRIT: CPT | Mod: 91 | Performed by: NURSE PRACTITIONER

## 2023-11-10 PROCEDURE — 25000003 PHARM REV CODE 250: Performed by: NURSE PRACTITIONER

## 2023-11-10 PROCEDURE — 51700 IRRIGATION OF BLADDER: CPT | Mod: ,,, | Performed by: STUDENT IN AN ORGANIZED HEALTH CARE EDUCATION/TRAINING PROGRAM

## 2023-11-10 PROCEDURE — 99223 PR INITIAL HOSPITAL CARE,LEVL III: ICD-10-PCS | Mod: 25,,, | Performed by: STUDENT IN AN ORGANIZED HEALTH CARE EDUCATION/TRAINING PROGRAM

## 2023-11-10 PROCEDURE — 51700 IRRIGATION OF BLADDER: CPT

## 2023-11-10 PROCEDURE — 36415 COLL VENOUS BLD VENIPUNCTURE: CPT | Performed by: NURSE PRACTITIONER

## 2023-11-10 PROCEDURE — 80053 COMPREHEN METABOLIC PANEL: CPT | Performed by: NURSE PRACTITIONER

## 2023-11-10 PROCEDURE — 99900035 HC TECH TIME PER 15 MIN (STAT)

## 2023-11-10 PROCEDURE — 85025 COMPLETE CBC W/AUTO DIFF WBC: CPT | Performed by: NURSE PRACTITIONER

## 2023-11-10 PROCEDURE — 51700 PR IRRIGATION, BLADDER: ICD-10-PCS | Mod: ,,, | Performed by: STUDENT IN AN ORGANIZED HEALTH CARE EDUCATION/TRAINING PROGRAM

## 2023-11-10 PROCEDURE — 96367 TX/PROPH/DG ADDL SEQ IV INF: CPT

## 2023-11-10 PROCEDURE — 36430 TRANSFUSION BLD/BLD COMPNT: CPT

## 2023-11-10 PROCEDURE — 94761 N-INVAS EAR/PLS OXIMETRY MLT: CPT

## 2023-11-10 RX ORDER — IBUPROFEN 200 MG
16 TABLET ORAL
Status: DISCONTINUED | OUTPATIENT
Start: 2023-11-10 | End: 2023-11-13 | Stop reason: HOSPADM

## 2023-11-10 RX ORDER — LANOLIN ALCOHOL/MO/W.PET/CERES
800 CREAM (GRAM) TOPICAL
Status: DISCONTINUED | OUTPATIENT
Start: 2023-11-10 | End: 2023-11-13 | Stop reason: HOSPADM

## 2023-11-10 RX ORDER — NALOXONE HCL 0.4 MG/ML
0.02 VIAL (ML) INJECTION
Status: DISCONTINUED | OUTPATIENT
Start: 2023-11-10 | End: 2023-11-13 | Stop reason: HOSPADM

## 2023-11-10 RX ORDER — FLUTICASONE PROPIONATE 50 MCG
2 SPRAY, SUSPENSION (ML) NASAL DAILY
Status: DISCONTINUED | OUTPATIENT
Start: 2023-11-10 | End: 2023-11-13 | Stop reason: HOSPADM

## 2023-11-10 RX ORDER — ALBUTEROL SULFATE 2.5 MG/.5ML
2.5 SOLUTION RESPIRATORY (INHALATION) EVERY 6 HOURS PRN
Status: DISCONTINUED | OUTPATIENT
Start: 2023-11-10 | End: 2023-11-13 | Stop reason: HOSPADM

## 2023-11-10 RX ORDER — POLYETHYLENE GLYCOL 3350 17 G/17G
17 POWDER, FOR SOLUTION ORAL 2 TIMES DAILY PRN
Status: DISCONTINUED | OUTPATIENT
Start: 2023-11-10 | End: 2023-11-13 | Stop reason: HOSPADM

## 2023-11-10 RX ORDER — CALCIUM GLUCONATE 20 MG/ML
1 INJECTION, SOLUTION INTRAVENOUS ONCE
Status: COMPLETED | OUTPATIENT
Start: 2023-11-10 | End: 2023-11-10

## 2023-11-10 RX ORDER — HYDROCODONE BITARTRATE AND ACETAMINOPHEN 5; 325 MG/1; MG/1
1 TABLET ORAL EVERY 6 HOURS PRN
Status: DISCONTINUED | OUTPATIENT
Start: 2023-11-10 | End: 2023-11-13 | Stop reason: HOSPADM

## 2023-11-10 RX ORDER — GLUCAGON 1 MG
1 KIT INJECTION
Status: DISCONTINUED | OUTPATIENT
Start: 2023-11-10 | End: 2023-11-13 | Stop reason: HOSPADM

## 2023-11-10 RX ORDER — TAMSULOSIN HYDROCHLORIDE 0.4 MG/1
0.4 CAPSULE ORAL DAILY
Status: DISCONTINUED | OUTPATIENT
Start: 2023-11-10 | End: 2023-11-13 | Stop reason: HOSPADM

## 2023-11-10 RX ORDER — TALC
6 POWDER (GRAM) TOPICAL NIGHTLY PRN
Status: DISCONTINUED | OUTPATIENT
Start: 2023-11-10 | End: 2023-11-13 | Stop reason: HOSPADM

## 2023-11-10 RX ORDER — ONDANSETRON 2 MG/ML
4 INJECTION INTRAMUSCULAR; INTRAVENOUS EVERY 6 HOURS PRN
Status: DISCONTINUED | OUTPATIENT
Start: 2023-11-10 | End: 2023-11-13 | Stop reason: HOSPADM

## 2023-11-10 RX ORDER — ACETAMINOPHEN 325 MG/1
650 TABLET ORAL EVERY 8 HOURS PRN
Status: DISCONTINUED | OUTPATIENT
Start: 2023-11-10 | End: 2023-11-13 | Stop reason: HOSPADM

## 2023-11-10 RX ORDER — IBUPROFEN 200 MG
24 TABLET ORAL
Status: DISCONTINUED | OUTPATIENT
Start: 2023-11-10 | End: 2023-11-13 | Stop reason: HOSPADM

## 2023-11-10 RX ORDER — MAG HYDROX/ALUMINUM HYD/SIMETH 200-200-20
30 SUSPENSION, ORAL (FINAL DOSE FORM) ORAL 4 TIMES DAILY PRN
Status: DISCONTINUED | OUTPATIENT
Start: 2023-11-10 | End: 2023-11-13 | Stop reason: HOSPADM

## 2023-11-10 RX ORDER — AMOXICILLIN 250 MG
1 CAPSULE ORAL 2 TIMES DAILY
Status: DISCONTINUED | OUTPATIENT
Start: 2023-11-10 | End: 2023-11-13 | Stop reason: HOSPADM

## 2023-11-10 RX ORDER — SODIUM CHLORIDE 0.9 % (FLUSH) 0.9 %
10 SYRINGE (ML) INJECTION EVERY 12 HOURS PRN
Status: DISCONTINUED | OUTPATIENT
Start: 2023-11-10 | End: 2023-11-13 | Stop reason: HOSPADM

## 2023-11-10 RX ADMIN — VANCOMYCIN HYDROCHLORIDE 1000 MG: 1 INJECTION, POWDER, LYOPHILIZED, FOR SOLUTION INTRAVENOUS at 01:11

## 2023-11-10 RX ADMIN — CALCIUM GLUCONATE 1 G: 20 INJECTION, SOLUTION INTRAVENOUS at 11:11

## 2023-11-10 RX ADMIN — Medication 800 MG: at 04:11

## 2023-11-10 RX ADMIN — DOCUSATE SODIUM AND SENNOSIDES 1 TABLET: 8.6; 5 TABLET, FILM COATED ORAL at 01:11

## 2023-11-10 RX ADMIN — CEFTRIAXONE SODIUM 1 G: 1 INJECTION, POWDER, FOR SOLUTION INTRAMUSCULAR; INTRAVENOUS at 08:11

## 2023-11-10 RX ADMIN — Medication 800 MG: at 11:11

## 2023-11-10 RX ADMIN — VANCOMYCIN HYDROCHLORIDE 1500 MG: 1.5 INJECTION, POWDER, LYOPHILIZED, FOR SOLUTION INTRAVENOUS at 01:11

## 2023-11-10 RX ADMIN — TAMSULOSIN HYDROCHLORIDE 0.4 MG: 0.4 CAPSULE ORAL at 09:11

## 2023-11-10 RX ADMIN — ACETAMINOPHEN 650 MG: 325 TABLET ORAL at 11:11

## 2023-11-10 NOTE — ASSESSMENT & PLAN NOTE
Patient's anemia is currently uncontrolled. Has received 1 units of PRBCs on 11/9/23. Etiology likely d/t acute blood loss which was from gross hematuria  Current CBC reviewed-   Lab Results   Component Value Date    HGB 9.6 (L) 11/10/2023    HCT 27.7 (L) 11/10/2023     Monitor serial CBC and transfuse if patient becomes hemodynamically unstable, symptomatic or H/H drops below 7/21.

## 2023-11-10 NOTE — NURSING
Pt arrived to room 304 via stretcher with CBI wide open going. NAD noted. VSS. Oriented pt to room. Call light with in reach.

## 2023-11-10 NOTE — ASSESSMENT & PLAN NOTE
Admit to med/tele  Consult urology -called per ED MD  Continue CBI  Trend H&H q6h  NPO  Will cover with vancomycin given history of E. Faecalis   Continue rocephin for now as well, and de-escalate as per C&S  Hold ASA/plavix

## 2023-11-10 NOTE — ED PROVIDER NOTES
Encounter Date: 11/9/2023       History     Chief Complaint   Patient presents with    Urinary Retention     Retaining urine since this AM; Hx enlarged prostate      Ryan Stewart is a 78 y.o. male presenting for evaluation of blood in his urine for the last couple of days.  Today, he went to Urology and a urine culture was collected and he was sent home with a prescription for Flomax and Macrobid.  He has noticed no fever, no chills.  Throughout the day, he became unable to urinate and felt as if he was having urinary retention with lots of lower abdominal pressure and pain.  He was unable to get his medications filled at the pharmacy.  He does currently take Plavix.  He has a past medical history of BPH with obstruction/lower urinary tract symptoms, Cancer, Carcinoma of appendix, Coronary artery disease, Elevated PSA, Gilbert's disease (09/28/2021), Hyperlipidemia, Hypertension, MI (myocardial infarction) (2005), Primary appendiceal adenocarcinoma, and Stented coronary artery (2005).      The history is provided by the patient.     Review of patient's allergies indicates:   Allergen Reactions    Ciprofloxacin      vomiting    Rapaflo [silodosin]      Urinating increased, kidney pain    Simvastatin Other (See Comments)     Severe muscle pain     Past Medical History:   Diagnosis Date    BPH with obstruction/lower urinary tract symptoms     Cancer     prostate    Carcinoma of appendix     Coronary artery disease     Elevated PSA     Gilbert's disease 09/28/2021    Hyperlipidemia     Hypertension     MI (myocardial infarction) 2005    Primary appendiceal adenocarcinoma     Stented coronary artery 2005    RCA     Past Surgical History:   Procedure Laterality Date    APPENDECTOMY      CARDIAC SURGERY      stent coronary    COLON SURGERY      CYSTOSCOPY      CYSTOSCOPY N/A 6/2/2021    Procedure: CYSTOSCOPY;  Surgeon: Craig Reza Jr., MD;  Location: [facility];  Service: Urology;  Laterality: N/A;    EAR EXAMINATION  UNDER ANESTHESIA      LAPAROSCOPIC APPENDECTOMY N/A 2021    Procedure: APPENDECTOMY, LAPAROSCOPIC;  Surgeon: Sundar Garcia MD;  Location: [facility] OR;  Service: General;  Laterality: N/A;    LAPAROSCOPIC CHOLECYSTECTOMY N/A 10/10/2021    Procedure: CHOLECYSTECTOMY, LAPAROSCOPIC;  Surgeon: Eulogio Yusuf MD;  Location: [facility] OR;  Service: General;  Laterality: N/A;    SURGICAL REMOVAL OF ILEUM WITH CECUM  2021    Procedure: EXCISION, CECUM WITH ILEUM;  Surgeon: Sundar Garcia MD;  Location: [facility] OR;  Service: General;;    TRANSRECTAL ULTRASOUND EXAMINATION N/A 2021    Procedure: ULTRASOUND, RECTAL APPROACH;  Surgeon: Craig Reza Jr., MD;  Location: [facility] OR;  Service: Urology;  Laterality: N/A;    TRANSURETHRAL RESECTION OF PROSTATE N/A 2021    Procedure: TURP (TRANSURETHRAL RESECTION OF PROSTATE);  Surgeon: Craig Reza Jr., MD;  Location: [facility] OR;  Service: Urology;  Laterality: N/A;     No family history on file.  Social History     Tobacco Use    Smoking status: Former     Current packs/day: 0.00     Types: Cigarettes     Quit date: 1991     Years since quittin.4    Smokeless tobacco: Never    Tobacco comments:     cigars and pipe   Substance Use Topics    Alcohol use: Not Currently    Drug use: Not Currently     Review of Systems   Constitutional:  Negative for chills and fever.   Respiratory:  Negative for cough, chest tightness, shortness of breath and wheezing.    Cardiovascular:  Negative for chest pain and palpitations.   Gastrointestinal:  Positive for abdominal pain. Negative for diarrhea, nausea and vomiting.   Genitourinary:  Positive for decreased urine volume, difficulty urinating and hematuria. Negative for dysuria.   Musculoskeletal:  Negative for arthralgias, back pain, joint swelling, myalgias, neck pain and neck stiffness.   Skin:  Negative for color change, pallor, rash and wound.   Neurological:  Negative for weakness and numbness.   Hematological:  Does not  bruise/bleed easily.       Physical Exam     Initial Vitals [11/09/23 1638]   BP Pulse Resp Temp SpO2   (!) 151/83 93 20 98.8 °F (37.1 °C) 97 %      MAP       --         Physical Exam    Nursing note and vitals reviewed.  Constitutional: He appears well-developed and well-nourished. He is not diaphoretic. No distress.   HENT:   Head: Normocephalic and atraumatic.   Mouth/Throat: Oropharynx is clear and moist.   Cardiovascular:  Normal rate, regular rhythm, normal heart sounds and intact distal pulses.     Exam reveals no gallop and no friction rub.       No murmur heard.  Pulmonary/Chest: Breath sounds normal. No respiratory distress. He has no wheezes. He has no rhonchi. He has no rales. He exhibits no tenderness.   Abdominal: Abdomen is soft. He exhibits no distension and no mass. There is abdominal tenderness.   Suprapubic TTP and fullness noted.    Genitourinary:    Genitourinary Comments: Gross hematuria noted, leaking from end of penis.      Musculoskeletal:         General: No tenderness or edema. Normal range of motion.     Neurological: He is alert and oriented to person, place, and time. He has normal strength. No sensory deficit.   Skin: Skin is warm and dry. No rash and no abscess noted. No erythema.   Psychiatric: He has a normal mood and affect.         ED Course   Procedures  Labs Reviewed   CBC W/ AUTO DIFFERENTIAL - Abnormal; Notable for the following components:       Result Value    WBC 13.38 (*)     RBC 4.04 (*)     Hemoglobin 12.6 (*)     Hematocrit 37.0 (*)     MCH 31.2 (*)     Gran # (ANC) 11.5 (*)     Immature Grans (Abs) 0.05 (*)     Gran % 85.5 (*)     Lymph % 8.6 (*)     All other components within normal limits   COMPREHENSIVE METABOLIC PANEL - Abnormal; Notable for the following components:    Sodium 134 (*)     CO2 22 (*)     Glucose 158 (*)     Calcium 8.3 (*)     Total Bilirubin 2.1 (*)     Alkaline Phosphatase 51 (*)     All other components within normal limits          Imaging  Results    None          Medications   cefTRIAXone (ROCEPHIN) 1 g in dextrose 5 % in water (D5W) 100 mL IVPB (MB+) (0 g Intravenous Stopped 11/9/23 2129)   tamsulosin 24 hr capsule 0.4 mg (0.4 mg Oral Given 11/9/23 1854)   sodium chloride 0.9% bolus 500 mL 500 mL (0 mLs Intravenous Stopped 11/9/23 1954)     Medical Decision Making  Differential Diagnosis:   Urinary Retention   Hematuria   Pyelonephritis   Renal Stone   UTI     Pt emergently evaluated here in the ED.       9:22 PM  Labs shows stable H&H and is currently undergoing bladder irrigation.  Normal renal function.  He is given a dose of IV Rocephin.  Will continue irrigation to clear with hope of discharging him home to follow-up as scheduled with Urology.  Dr. Son will resume care at this time.        Amount and/or Complexity of Data Reviewed  Labs: ordered. Decision-making details documented in ED Course.    Risk  Prescription drug management.               ED Course as of 11/09/23 2124   Thu Nov 09, 2023 1955 I saw the patient face-to-face.  The patient has a 16 [place] Zazueta catheter in his still having bright red blood despite irrigation.  He is on Plavix.  H&H is stable.  Urinalysis ordered by Urology and start him on Macrobid.  Will give him a dose of Rocephin here.  Will perform CBI and discuss with Urology. [JS]   2113 Larger, 3 way catheter was placed with continuous bladder irrigation in process.   [HS]   2119 Spoke with Dr. Flowers and she recommends hand irrigation to remove all of the clots until irrigated to clear, then CBI if additional clearing is necessary.  [HS]      ED Course User Index  [HS] Yee Dumont PA-C  [JS] Amilcar Son MD                    Clinical Impression:   Final diagnoses:  [R31.0] Gross hematuria (Primary)  [R33.9] Urinary retention               Yee Dmuont PA-C  11/09/23 2124

## 2023-11-10 NOTE — PLAN OF CARE
Unable to schedule PCP hospital follow up due to office being closed. Updated AVS with instructions for pt to call after DC to schedule apt        11/10/23 5206   Post-Acute Status   Hospital Resources/Appts/Education Provided Appointment suggestion unavailable

## 2023-11-10 NOTE — PLAN OF CARE
[place] [facility] - Med/Surg  Initial Discharge Assessment       Primary Care Provider: Erik Perry MD    Admission Diagnosis: Gross hematuria [R31.0]    Admission Date: 11/9/2023  Expected Discharge Date: 11/12/2023    Transition of Care Barriers: None    Payor: MEDICARE / Plan: MEDICARE PART A & B / Product Type: Government /     Extended Emergency Contact Information  Primary Emergency Contact: Dora Stewart  Address: [address] [place], [place] [address] [place] [address]  Home Phone: [phone]  Relation: Spouse  Preferred language: English   needed? No  Secondary Emergency Contact: CONTACT, NO OTHER  Relation: None    Discharge Plan A: Home  Discharge Plan B: Home with family      [id number] - [place] - [address] [facility] [address]  [address]  [place] [address]  Phone: [phone] Fax: [phone]    SW met with patient at bedside to complete discharge planning assessment.  Patient alert and oriented xs 4.  Patient verified all demographic information on facesheet is correct.  Patient verified PCP is Dr. Perry.  Patient verified primary health insurance is Medicare and secondary is [facility].  Patient with NO home health or DME.  Patient with POA (spouse) and Living Will.  Patient not on dialysis or medication coumadin.  Patient with no 30 day admission.  Patient with no financial issues at this time.  Patient will provide transportation upon discharge from facility.  Patient independent with ADLs, live with spouse, drives self.      Initial Assessment (most recent)       Adult Discharge Assessment - 11/10/23 1240          Discharge Assessment    Assessment Type Discharge Planning Assessment     Confirmed/corrected address, phone number and insurance Yes     Confirmed Demographics Correct on Facesheet     Source of Information patient     Does patient/caregiver understand observation status Yes     Communicated IBAN with  patient/caregiver Yes     People in Home spouse     Facility Arrived From: home     Do you expect to return to your current living situation? Yes     Do you have help at home or someone to help you manage your care at home? Yes     Who are your caregiver(s) and their phone number(s)? self     Prior to hospitilization cognitive status: Alert/Oriented     Current cognitive status: Alert/Oriented     Readmission within 30 days? No     Patient currently being followed by outpatient case management? No     Do you currently have service(s) that help you manage your care at home? No     Do you take prescription medications? Yes     Do you have prescription coverage? Yes     Do you have any problems affording any of your prescribed medications? No     Is the patient taking medications as prescribed? yes     Who is going to help you get home at discharge? self     How do you get to doctors appointments? car, drives self     Are you on dialysis? No     Do you take coumadin? No     DME Needed Upon Discharge  none     Discharge Plan discussed with: Patient     Transition of Care Barriers None     Discharge Plan A Home     Discharge Plan B Home with family

## 2023-11-10 NOTE — CONSULTS
"Pharmacokinetic Initial Assessment: IV Vancomycin    Assessment/Plan:    Initiate intravenous vancomycin with dose of 1500 mg once followed by a maintenance dose of vancomycin 1000mg IV every 12 hours  Desired empiric serum trough concentration is 15 to 20 mcg/mL  Draw vancomycin trough level 60 min prior to third dose on 11/10/23 at approximately 2300  Pharmacy will continue to follow and monitor vancomycin.      Please contact pharmacy at extension [phone] with any questions regarding this assessment.     Thank you for the consult,   Jeremy Burgosen       Patient brief summary:  Ryan Stewart is a 78 y.o. male initiated on antimicrobial therapy with IV Vancomycin for treatment of suspected urinary tract infection    Drug Allergies:   Review of patient's allergies indicates:   Allergen Reactions    Ciprofloxacin      vomiting    Rapaflo [silodosin]      Urinating increased, kidney pain    Simvastatin Other (See Comments)     Severe muscle pain       Actual Body Weight:   103.4kg    Renal Function:   Estimated Creatinine Clearance: 67 mL/min (based on SCr of 1 mg/dL).,     CBC (last 72 hours):  Recent Labs   Lab Result Units 11/09/23  1820 11/09/23  2218   WBC K/uL 13.38* 12.48   Hemoglobin g/dL 12.6* 9.7*   Hematocrit % 37.0* 28.7*   Platelets K/uL 227 185   Gran % % 85.5* 87.3*   Lymph % % 8.6* 7.5*   Mono % % 4.5 4.2   Eosinophil % % 0.3 0.0   Basophil % % 0.7 0.4   Differential Method  Automated Automated       Metabolic Panel (last 72 hours):  Recent Labs   Lab Result Units 11/09/23  1820   Sodium mmol/L 134*   Potassium mmol/L 4.7   Chloride mmol/L 103   CO2 mmol/L 22*   Glucose mg/dL 158*   BUN mg/dL 21   Creatinine mg/dL 1.0   Albumin g/dL 3.7   Total Bilirubin mg/dL 2.1*   Alkaline Phosphatase U/L 51*   AST U/L 23   ALT U/L 30       Drug levels (last 3 results):  No results for input(s): "VANCOMYCINRA", "VANCORANDOM", "VANCOMYCINPE", "VANCOPEAK", "VANCOMYCINTR", "VANCOTROUGH" in the last 72 " hours.    Microbiologic Results:  Microbiology Results (last 7 days)       ** No results found for the last 168 hours. **             0

## 2023-11-10 NOTE — PLAN OF CARE
Problem: Infection  Goal: Absence of Infection Signs and Symptoms  Outcome: Ongoing, Progressing     Problem: Adult Inpatient Plan of Care  Goal: Plan of Care Review  Outcome: Ongoing, Progressing  Goal: Patient-Specific Goal (Individualized)  Outcome: Ongoing, Progressing  Goal: Absence of Hospital-Acquired Illness or Injury  Outcome: Ongoing, Progressing  Goal: Optimal Comfort and Wellbeing  Outcome: Ongoing, Progressing  Goal: Readiness for Transition of Care  Outcome: Ongoing, Progressing     Problem: Impaired Wound Healing  Goal: Optimal Wound Healing  Outcome: Ongoing, Progressing    POC reviewed with pt, verbalized understanding. Patient is alert and oriented x 4, able to make needs known. Continuous cardiac monitoring in place as ordered. A-febrile. ABX administered as scheduled. Meds given per MAR. Pt on 2L NC. Repositions self independently. No complaints of pain or discomfort. Zazueta to gravity in place for urinary retention, no s/s of infection to insertion site. CBI going wide open, blood tinge with several clots noted. IS at bedside, instructed on use and return demonstration performed. Purposeful hourly/q2hr rounding done during shift to promote patient safety. NAD noted. Safety maintained with side rails up x3, bed wheels locked, bed in lowest position, bed alarm set, slip resistant socks maintained, call light in reach. Patient educated to call for assistance when needed, verbalized understanding. Pt remains free of falls. No further needs expressed at this time. Will continue to monitor.

## 2023-11-10 NOTE — SUBJECTIVE & OBJECTIVE
Past Medical History:   Diagnosis Date    BPH with obstruction/lower urinary tract symptoms     Cancer     prostate    Carcinoma of appendix     Coronary artery disease     Elevated PSA     Gilbert's disease 09/28/2021    Hyperlipidemia     Hypertension     MI (myocardial infarction) 2005    Primary appendiceal adenocarcinoma     Stented coronary artery 2005    RCA       Past Surgical History:   Procedure Laterality Date    APPENDECTOMY      CARDIAC SURGERY      stent coronary    COLON SURGERY      CYSTOSCOPY      CYSTOSCOPY N/A 6/2/2021    Procedure: CYSTOSCOPY;  Surgeon: Craig Reza Jr., MD;  [facility];  Service: Urology;  Laterality: N/A;    EAR EXAMINATION UNDER ANESTHESIA      LAPAROSCOPIC APPENDECTOMY N/A 5/6/2021    Procedure: APPENDECTOMY, LAPAROSCOPIC;  Surgeon: Sundar Garcia MD;  Location: [facility] OR;  Service: General;  Laterality: N/A;    LAPAROSCOPIC CHOLECYSTECTOMY N/A 10/10/2021    Procedure: CHOLECYSTECTOMY, LAPAROSCOPIC;  Surgeon: Eulogio Yusuf MD;  Location: [facility] OR;  Service: General;  Laterality: N/A;    SURGICAL REMOVAL OF ILEUM WITH CECUM  5/6/2021    Procedure: EXCISION, CECUM WITH ILEUM;  Surgeon: Sundar Garcia MD;  Location: [facility] OR;  Service: General;;    TRANSRECTAL ULTRASOUND EXAMINATION N/A 6/2/2021    Procedure: ULTRASOUND, RECTAL APPROACH;  Surgeon: Craig Reza Jr., MD;  [facility];  Service: Urology;  Laterality: N/A;    TRANSURETHRAL RESECTION OF PROSTATE N/A 6/22/2021    Procedure: TURP (TRANSURETHRAL RESECTION OF PROSTATE);  Surgeon: Craig Reza Jr., MD;  Location: [facility] OR;  Service: Urology;  Laterality: N/A;       Review of patient's allergies indicates:   Allergen Reactions    Ciprofloxacin      vomiting    Rapaflo [silodosin]      Urinating increased, kidney pain    Simvastatin Other (See Comments)     Severe muscle pain       Family History    None         Tobacco Use    Smoking status: Former     Current packs/day: 0.00     Types: Cigarettes      Quit date: 1991     Years since quittin.4    Smokeless tobacco: Never    Tobacco comments:     cigars and pipe   Substance and Sexual Activity    Alcohol use: Not Currently    Drug use: Not Currently    Sexual activity: Not on file       Review of Systems   Constitutional:  Negative for fever.   Genitourinary:  Positive for difficulty urinating and hematuria.   Neurological:  Negative for weakness.       Objective:     Temp:  [97.6 °F (36.4 °C)-98.8 °F (37.1 °C)] 98.3 °F (36.8 °C)  Pulse:  [59-93] 89  Resp:  [16-20] 18  SpO2:  [95 %-100 %] 98 %  BP: ()/(42-91) 123/58  Weight: 103.4 kg (228 lb)  Body mass index is 36.8 kg/m².      Bladder Scan Volume (mL): (S) 383 mL (23 1731)    Drains       Drain  Duration                  Urethral Catheter 11/10/23 1045 Triple-lumen 24 Fr. <1 day                     Physical Exam  Constitutional:       General: He is not in acute distress.     Appearance: Normal appearance. He is obese. He is not ill-appearing.   HENT:      Head: Normocephalic and atraumatic.   Eyes:      General: No scleral icterus.  Cardiovascular:      Rate and Rhythm: Normal rate and regular rhythm.   Pulmonary:      Effort: Pulmonary effort is normal. No respiratory distress.   Abdominal:      General: There is no distension.      Palpations: Abdomen is soft.   Genitourinary:     Comments: Zazueta draining light pink on medium CBI  Skin:     Coloration: Skin is not jaundiced.   Neurological:      General: No focal deficit present.      Mental Status: He is alert and oriented to person, place, and time.   Psychiatric:         Mood and Affect: Mood normal.         Behavior: Behavior normal.         Thought Content: Thought content normal.          Significant Labs:    BMP:  Recent Labs   Lab 11/09/23  1820 11/10/23  0459   * 135*   K 4.7 4.3    108   CO2 22* 18*   BUN 21 25*   CREATININE 1.0 0.9   CALCIUM 8.3* 7.4*       CBC:  Recent Labs   Lab 23  2218  11/10/23  0459 11/10/23  1135   WBC 13.38* 12.48 12.40  --    HGB 12.6* 9.7* 10.7*  10.7* 9.6*   HCT 37.0* 28.7* 31.0*  30.8* 27.7*    185 187  --            Significant Imaging:  All pertinent imaging results/findings from the past 24 hours have been reviewed.

## 2023-11-10 NOTE — ASSESSMENT & PLAN NOTE
Admit to med/tele  Consult urology -called per ED MD  Continue CBI  Trend H&H q6h  Cardiac diet-no urgent plans for intervention  Will cover with vancomycin given history of E. Faecalis   Continue rocephin for now as well, and de-escalate as per C&S  Hold ASA/plavix

## 2023-11-10 NOTE — NURSING
Nurses Note -- 4 Eyes      11/10/2023   1:39 AM      Skin assessed during: Admit      [] No Altered Skin Integrity Present    []Prevention Measures Documented      [x] Yes- Altered Skin Integrity Present or Discovered   [x] LDA Added if Not in Epic (Describe Wound)   [x] New Altered Skin Integrity was Present on Admit and Documented in LDA   [x] Wound Image Taken    Wound Care Consulted? Yes    Attending Nurse:  Sandra Grace LPN     Second RN/Staff Member:   Kishore Galvan RN

## 2023-11-10 NOTE — ASSESSMENT & PLAN NOTE
Patient's anemia is currently uncontrolled. Has received 1 units of PRBCs on 11/9/23. Etiology likely d/t acute blood loss which was from gross hematuria  Current CBC reviewed-   Lab Results   Component Value Date    HGB 9.7 (L) 11/09/2023    HCT 28.7 (L) 11/09/2023     Monitor serial CBC and transfuse if patient becomes hemodynamically unstable, symptomatic or H/H drops below 7/21.

## 2023-11-10 NOTE — NURSING
3 way alexis catheter changed to 24 fr to allow flow of urine/ clots from bladder. Slight resistance met with passage of alexis- pt states he is aware that he has an enlarged prostate. Urine return noted- red colored, one small clot noted. CBI reconnected- rate increased of CBI with urine color changing to clear pale pink color, no further clots noted. Pericare given after new alexis cath placed.

## 2023-11-10 NOTE — ED NOTES
I attempted to call the patient's wife with an update on admission. I was unable to leave a voicemail.

## 2023-11-10 NOTE — H&P
Critical access hospital Medicine  History & Physical    Patient Name: Ryan Stewart  MRN: [id number]  Patient Class: OP- Observation  Admission Date: 11/9/2023  Attending Physician: Dr. Amin  Primary Care Provider: Erik Perry MD         Patient information was obtained from patient, past medical records and ER records.     Subjective:     Principal Problem:Gross hematuria    Chief Complaint:   Chief Complaint   Patient presents with    Urinary Retention     Retaining urine since this AM; Hx enlarged prostate         HPI: Mr. Stewart is an 78 year old male with a history of HTN, CAD s/p RCA stent (2005), Gilbert's, appendiceal carcinoma, BPH s/p TURP by Dr Craig Reza on 6/2/21 who presents today with complaints of urinary retention. It is severe. It is associated with hematuria. He denies fever, chills, N/V/D, chest pain, or SOB. Symptoms began the prior day and he was seen by urology NP today, urine culture and cytology was collected, and prescribed flomax and macrobid.  He was unfortunately unable to get it filled d/t pharmacy back log. UA not available at this time. Prior urine cultures all with E. Faecalis.  He was unable to urinate prompting his presentation. In the ED, a alexis was placed and irrigated with aspiration of several clots with subsequent gross hematuria. Alexis was replaced with a 3 way alexis and CBI was started as recommended by urology. With aspiration of the large clots, he became diaphoretic, lightheaded, and hypotension that resolved with fluids and likely a vasovagal response. H&H dropped from 12.6/37 to 9.7/28.7 in 4 hours, and he was transfused 1 unit PRBCs. ED MD discussed case with urology, Dr. Flowers. Hospital medicine is consulted for admission.       Past Medical History:   Diagnosis Date    BPH with obstruction/lower urinary tract symptoms     Cancer     prostate    Carcinoma of appendix     Coronary artery disease     Elevated PSA     Gilbert's  disease 09/28/2021    Hyperlipidemia     Hypertension     MI (myocardial infarction) 2005    Primary appendiceal adenocarcinoma     Stented coronary artery 2005    RCA       Past Surgical History:   Procedure Laterality Date    APPENDECTOMY      CARDIAC SURGERY      stent coronary    COLON SURGERY      CYSTOSCOPY      CYSTOSCOPY N/A 6/2/2021    Procedure: CYSTOSCOPY;  Surgeon: Craig Reza Jr., MD;  Location: [facility] OR;  Service: Urology;  Laterality: N/A;    EAR EXAMINATION UNDER ANESTHESIA      LAPAROSCOPIC APPENDECTOMY N/A 5/6/2021    Procedure: APPENDECTOMY, LAPAROSCOPIC;  Surgeon: Sundar Garcia MD;  Location: [facility] OR;  Service: General;  Laterality: N/A;    LAPAROSCOPIC CHOLECYSTECTOMY N/A 10/10/2021    Procedure: CHOLECYSTECTOMY, LAPAROSCOPIC;  Surgeon: Eulogio Yusuf MD;  Location: [facility] OR;  Service: General;  Laterality: N/A;    SURGICAL REMOVAL OF ILEUM WITH CECUM  5/6/2021    Procedure: EXCISION, CECUM WITH ILEUM;  Surgeon: Sundar Garcia MD;  Location: [facility] OR;  Service: General;;    TRANSRECTAL ULTRASOUND EXAMINATION N/A 6/2/2021    Procedure: ULTRASOUND, RECTAL APPROACH;  Surgeon: Craig Reza Jr., MD;  Location: [facility] OR;  Service: Urology;  Laterality: N/A;    TRANSURETHRAL RESECTION OF PROSTATE N/A 6/22/2021    Procedure: TURP (TRANSURETHRAL RESECTION OF PROSTATE);  Surgeon: Craig Reza Jr., MD;  Location: [facility];  Service: Urology;  Laterality: N/A;       Review of patient's allergies indicates:   Allergen Reactions    Ciprofloxacin      vomiting    Rapaflo [silodosin]      Urinating increased, kidney pain    Simvastatin Other (See Comments)     Severe muscle pain       No current facility-administered medications on file prior to encounter.     Current Outpatient Medications on File Prior to Encounter   Medication Sig    aspirin (ECOTRIN) 81 MG EC tablet Take 1 tablet (81 mg total) by mouth every morning. Start on Sunday 6/28    capecitabine (XELODA) 500 MG Tab  Takes 3 tablets by mouth 2 times daily for 2 weeks on 1 week off..    carvedilol (COREG) 12.5 MG tablet 12.5 mg 2 (two) times daily.     CHOLECALCIFEROL, VITAMIN D3, ORAL Take 2,000 Units by mouth every morning.     clopidogreL (PLAVIX) 75 mg tablet Take 1 tablet (75 mg total) by mouth every morning. Start on     fish oil-omega-3 fatty acids 300-1,000 mg capsule Take 1 capsule by mouth 2 (two) times daily.     fluticasone (FLONASE) 50 mcg/actuation nasal spray 2 sprays by Each Nostril route daily as needed for Rhinitis.     HYDROcodone-acetaminophen (NORCO) 5-325 mg per tablet Take 1 tablet by mouth every 6 (six) hours as needed for Pain.    lovastatin (MEVACOR) 40 MG tablet Take 40 mg by mouth nightly.     MICARDIS 40 mg Tab Take 40 mg by mouth every morning.     nitrofurantoin, macrocrystal-monohydrate, (MACROBID) 100 MG capsule Take 1 capsule (100 mg total) by mouth 2 (two) times daily. for 14 days    NITROSTAT 0.4 mg SL tablet Place 0.4 mg under the tongue every 5 (five) minutes as needed for Chest pain.     tamsulosin (FLOMAX) 0.4 mg Cap Take 1 capsule (0.4 mg total) by mouth once daily. Take at bedtime    albuterol (PROVENTIL/VENTOLIN HFA) 90 mcg/actuation inhaler Inhale 2 puffs into the lungs every 6 (six) hours as needed for Wheezing or Shortness of Breath. Rescue    amLODIPine (NORVASC) 10 MG tablet Take 1 tablet (10 mg total) by mouth every evening.     Family History    None       Tobacco Use    Smoking status: Former     Current packs/day: 0.00     Types: Cigarettes     Quit date: 1991     Years since quittin.4    Smokeless tobacco: Never    Tobacco comments:     cigars and pipe   Substance and Sexual Activity    Alcohol use: Not Currently    Drug use: Not Currently    Sexual activity: Not on file     Review of Systems   Constitutional:  Negative for chills, diaphoresis, fatigue and fever.   HENT:  Negative for congestion, ear pain, sore throat and trouble  swallowing.    Eyes:  Negative for pain, discharge and visual disturbance.   Respiratory:  Negative for cough, chest tightness, shortness of breath and wheezing.    Cardiovascular:  Negative for chest pain, palpitations and leg swelling.   Gastrointestinal:  Negative for abdominal distention, abdominal pain, blood in stool, constipation, diarrhea, nausea and vomiting.   Endocrine: Negative for polydipsia, polyphagia and polyuria.   Genitourinary:  Positive for difficulty urinating and hematuria. Negative for dysuria, flank pain, frequency and urgency.   Musculoskeletal:  Negative for back pain, joint swelling, neck pain and neck stiffness.   Skin:  Negative for rash and wound.   Allergic/Immunologic: Negative for immunocompromised state.   Neurological:  Positive for light-headedness. Negative for dizziness, syncope, speech difficulty, weakness, numbness and headaches.   Hematological:  Negative for adenopathy.   Psychiatric/Behavioral:  Negative for confusion and suicidal ideas. The patient is not nervous/anxious.    All other systems reviewed and are negative.    Objective:     Vital Signs (Most Recent):  Temp: 98.1 °F (36.7 °C) (11/10/23 0203)  Pulse: 69 (11/10/23 0203)  Resp: 18 (11/10/23 0203)  BP: (!) 106/54 (11/10/23 0203)  SpO2: 100 % (11/10/23 0203) Vital Signs (24h Range):  Temp:  [97.6 °F (36.4 °C)-98.8 °F (37.1 °C)] 98.1 °F (36.7 °C)  Pulse:  [59-93] 69  Resp:  [16-20] 18  SpO2:  [95 %-100 %] 100 %  BP: ()/(42-92) 106/54     Weight: 103.4 kg (228 lb)  Body mass index is 36.8 kg/m².     Physical Exam  Vitals and nursing note reviewed.   Constitutional:       Appearance: He is well-developed.   HENT:      Head: Normocephalic and atraumatic.   Eyes:      Conjunctiva/sclera: Conjunctivae normal.      Pupils: Pupils are equal, round, and reactive to light.   Cardiovascular:      Rate and Rhythm: Normal rate and regular rhythm.      Heart sounds: Normal heart sounds.   Pulmonary:      Effort: Pulmonary  effort is normal.      Breath sounds: Normal breath sounds.   Abdominal:      General: Bowel sounds are normal.      Palpations: Abdomen is soft.   Genitourinary:     Comments: Alexis with CBI draining pink fluid    Musculoskeletal:         General: Normal range of motion.      Cervical back: Normal range of motion and neck supple.   Skin:     General: Skin is warm and dry.      Capillary Refill: Capillary refill takes less than 2 seconds.   Neurological:      Mental Status: He is alert and oriented to person, place, and time.   Psychiatric:         Behavior: Behavior normal.         Thought Content: Thought content normal.         Judgment: Judgment normal.              CRANIAL NERVES     CN III, IV, VI   Pupils are equal, round, and reactive to light.       Significant Labs: All pertinent labs within the past 24 hours have been reviewed.  CBC:   Recent Labs   Lab 11/09/23  1820 11/09/23  2218   WBC 13.38* 12.48   HGB 12.6* 9.7*   HCT 37.0* 28.7*    185     CMP:   Recent Labs   Lab 11/09/23  1820   *   K 4.7      CO2 22*   *   BUN 21   CREATININE 1.0   CALCIUM 8.3*   PROT 6.2   ALBUMIN 3.7   BILITOT 2.1*   ALKPHOS 51*   AST 23   ALT 30   ANIONGAP 9             Assessment/Plan:     * Gross hematuria  Admit to med/tele  Consult urology -called per ED MD  Continue CBI  Trend H&H q6h  NPO  Will cover with vancomycin given history of E. Faecalis   Continue rocephin for now as well, and de-escalate as per C&S  Hold ASA/plavix    Urinary retention  Continue flomax  3way alexis with CBI  Urology consulted      Mixed hyperlipidemia  Continue statin      Hypertension  Chronic, controlled. Latest blood pressure and vitals reviewed-     Temp:  [97.6 °F (36.4 °C)-98.8 °F (37.1 °C)]   Pulse:  [59-93]   Resp:  [16-20]   BP: ()/(42-92)   SpO2:  [95 %-100 %] .   Home meds for hypertension were reviewed and noted below.   Hypertension Medications             amLODIPine (NORVASC) 10 MG tablet Take 1  tablet (10 mg total) by mouth every evening.    carvedilol (COREG) 12.5 MG tablet 12.5 mg 2 (two) times daily.     MICARDIS 40 mg Tab Take 40 mg by mouth every morning.     NITROSTAT 0.4 mg SL tablet Place 0.4 mg under the tongue every 5 (five) minutes as needed for Chest pain.           While in the hospital, will manage blood pressure as follows; Adjust home antihypertensive regimen as follows- hold for hypotension    Will utilize p.r.n. blood pressure medication only if patient's blood pressure greater than 180/110 and he develops symptoms such as worsening chest pain or shortness of breath.    Acute blood loss anemia  Patient's anemia is currently uncontrolled. Has received 1 units of PRBCs on 11/9/23. Etiology likely d/t acute blood loss which was from gross hematuria  Current CBC reviewed-   Lab Results   Component Value Date    HGB 9.7 (L) 11/09/2023    HCT 28.7 (L) 11/09/2023     Monitor serial CBC and transfuse if patient becomes hemodynamically unstable, symptomatic or H/H drops below 7/21.      VTE Risk Mitigation (From admission, onward)         Ordered     IP VTE HIGH RISK PATIENT  Once         11/10/23 0058     Place sequential compression device  Until discontinued         11/10/23 0058                               Liset Khalil NP  Department of Hospital Medicine  [facility]/Surg    COMPLETED  Family history is reviewed and has not changed   Pertinent information:

## 2023-11-10 NOTE — CONSULTS
[facility]  Urology  Consult Note    Patient Name: Ryan Stewart  MRN: [id number]  Admission Date: 11/9/2023  Hospital Length of Stay: 0   Code Status: Full Code   Attending Provider: Iza Amin MD   Consulting Provider: Coby Flowers MD  Primary Care Physician: Erik Perry MD  Principal Problem:Gross hematuria    Inpatient consult to Urology  Consult performed by: Coby Flowers MD  Consult ordered by: Liset Khalil NP  Reason for consult: hematuria, urinary retention           Subjective:     HPI:  Ryan Stewart is a 78 y.o. male with hx of HTN, CAD s/p RCA stent (2005), Gilbert's, appendiceal carcinoma.     He presented to the ED yesterday with hematuria and difficulty urinating.     He is s/p TURP in June 2021 with Dr. Reza.     18 Fr 3-way alexis placed in ED and patient has been on CBI requiring manual irrigation.     His H/H dropped from 12.6/37 to 9.7/28.7 while in the ED and he was transfused 1 unit. Stable this am at 10.7/31.    Vitals are currently stable and he is afebrile. No other complaints noted.   Urine culture is in process.       Past Medical History:   Diagnosis Date    BPH with obstruction/lower urinary tract symptoms     Cancer     prostate    Carcinoma of appendix     Coronary artery disease     Elevated PSA     Gilbert's disease 09/28/2021    Hyperlipidemia     Hypertension     MI (myocardial infarction) 2005    Primary appendiceal adenocarcinoma     Stented coronary artery 2005    RCA       Past Surgical History:   Procedure Laterality Date    APPENDECTOMY      CARDIAC SURGERY      stent coronary    COLON SURGERY      CYSTOSCOPY      CYSTOSCOPY N/A 6/2/2021    Procedure: CYSTOSCOPY;  Surgeon: Craig Reza Jr., MD;  [facility];  Service: Urology;  Laterality: N/A;    EAR EXAMINATION UNDER ANESTHESIA      LAPAROSCOPIC APPENDECTOMY N/A 5/6/2021    Procedure: APPENDECTOMY, LAPAROSCOPIC;  Surgeon: Sundar Garcia MD;   Location: [facility] OR;  Service: General;  Laterality: N/A;    LAPAROSCOPIC CHOLECYSTECTOMY N/A 10/10/2021    Procedure: CHOLECYSTECTOMY, LAPAROSCOPIC;  Surgeon: Eulogio Yusuf MD;  Location: [facility] OR;  Service: General;  Laterality: N/A;    SURGICAL REMOVAL OF ILEUM WITH CECUM  2021    Procedure: EXCISION, CECUM WITH ILEUM;  Surgeon: Sundar Garcia MD;  Location: [facility] OR;  Service: General;;    TRANSRECTAL ULTRASOUND EXAMINATION N/A 2021    Procedure: ULTRASOUND, RECTAL APPROACH;  Surgeon: Craig Reza Jr., MD;  Location: [facility] OR;  Service: Urology;  Laterality: N/A;    TRANSURETHRAL RESECTION OF PROSTATE N/A 2021    Procedure: TURP (TRANSURETHRAL RESECTION OF PROSTATE);  Surgeon: Craig Reza Jr., MD;  Location: [facility] OR;  Service: Urology;  Laterality: N/A;       Review of patient's allergies indicates:   Allergen Reactions    Ciprofloxacin      vomiting    Rapaflo [silodosin]      Urinating increased, kidney pain    Simvastatin Other (See Comments)     Severe muscle pain       Family History    None         Tobacco Use    Smoking status: Former     Current packs/day: 0.00     Types: Cigarettes     Quit date: 1991     Years since quittin.4    Smokeless tobacco: Never    Tobacco comments:     cigars and pipe   Substance and Sexual Activity    Alcohol use: Not Currently    Drug use: Not Currently    Sexual activity: Not on file       Review of Systems   Constitutional:  Negative for fever.   Genitourinary:  Positive for difficulty urinating and hematuria.   Neurological:  Negative for weakness.       Objective:     Temp:  [97.6 °F (36.4 °C)-98.8 °F (37.1 °C)] 98.3 °F (36.8 °C)  Pulse:  [59-93] 89  Resp:  [16-20] 18  SpO2:  [95 %-100 %] 98 %  BP: ()/(42-91) 123/58  Weight: 103.4 kg (228 lb)  Body mass index is 36.8 kg/m².      Bladder Scan Volume (mL): (S) 383 mL (23 1731)    Drains       Drain  Duration                  Urethral Catheter 11/10/23 1045 Triple-lumen  24 Fr. <1 day                     Physical Exam  Constitutional:       General: He is not in acute distress.     Appearance: Normal appearance. He is obese. He is not ill-appearing.   HENT:      Head: Normocephalic and atraumatic.   Eyes:      General: No scleral icterus.  Cardiovascular:      Rate and Rhythm: Normal rate and regular rhythm.   Pulmonary:      Effort: Pulmonary effort is normal. No respiratory distress.   Abdominal:      General: There is no distension.      Palpations: Abdomen is soft.   Genitourinary:     Comments: Alexis draining light pink on medium CBI  Skin:     Coloration: Skin is not jaundiced.   Neurological:      General: No focal deficit present.      Mental Status: He is alert and oriented to person, place, and time.   Psychiatric:         Mood and Affect: Mood normal.         Behavior: Behavior normal.         Thought Content: Thought content normal.          Significant Labs:    BMP:  Recent Labs   Lab 11/09/23  1820 11/10/23  0459   * 135*   K 4.7 4.3    108   CO2 22* 18*   BUN 21 25*   CREATININE 1.0 0.9   CALCIUM 8.3* 7.4*       CBC:  Recent Labs   Lab 11/09/23  1820 11/09/23  2218 11/10/23  0459 11/10/23  1135   WBC 13.38* 12.48 12.40  --    HGB 12.6* 9.7* 10.7*  10.7* 9.6*   HCT 37.0* 28.7* 31.0*  30.8* 27.7*    185 187  --            Significant Imaging:  All pertinent imaging results/findings from the past 24 hours have been reviewed.          Assessment and Plan:     * Gross hematuria  - Alexis catheter upsized to 24 Fr 3 way   - Catheter was irrigated by me with a few small clots removed and alexis was able to clear to pink tinged  - Slow drip CBI restarted   - H/H is stable and vitals are stable   - No need for urgent intervention at this time  - Continue to hold plavix if ok from cardiac standpoint until hematuria resolves   - Titrate CBI to slow drip  - If continues to clear can possibly be discharged home tomorrow with alexis in place and outpatient follow  up with Dr. Reza  - Dr. Reza is aware of patient         VTE Risk Mitigation (From admission, onward)         Ordered     IP VTE HIGH RISK PATIENT  Once         11/10/23 0058     Place sequential compression device  Until discontinued         11/10/23 0058                Thank you for your consult. I will follow-up with patient. Please contact us if you have any additional questions.    Coby Flowers MD  Urology  [facility]/Surg

## 2023-11-10 NOTE — PLAN OF CARE
11/10/23 1240   SCHWARZ Message   Medicare Outpatient and Observation Notification regarding financial responsibility Explained to patient/caregiver;Signed/date by patient/caregiver   Date SCHWARZ was signed 11/10/23   Time SCHWARZ was signed 1240

## 2023-11-10 NOTE — ASSESSMENT & PLAN NOTE
Chronic, controlled. Latest blood pressure and vitals reviewed-     Temp:  [97.6 °F (36.4 °C)-98.8 °F (37.1 °C)]   Pulse:  [59-93]   Resp:  [16-20]   BP: ()/(42-92)   SpO2:  [95 %-100 %] .   Home meds for hypertension were reviewed and noted below.   Hypertension Medications             amLODIPine (NORVASC) 10 MG tablet Take 1 tablet (10 mg total) by mouth every evening.    carvedilol (COREG) 12.5 MG tablet 12.5 mg 2 (two) times daily.     MICARDIS 40 mg Tab Take 40 mg by mouth every morning.     NITROSTAT 0.4 mg SL tablet Place 0.4 mg under the tongue every 5 (five) minutes as needed for Chest pain.           While in the hospital, will manage blood pressure as follows; Adjust home antihypertensive regimen as follows- hold for hypotension    Will utilize p.r.n. blood pressure medication only if patient's blood pressure greater than 180/110 and he develops symptoms such as worsening chest pain or shortness of breath.

## 2023-11-10 NOTE — ASSESSMENT & PLAN NOTE
- Alexis catheter upsized to 24 Fr 3 way   - Catheter was irrigated by me with a few small clots removed and alexis was able to clear to pink tinged  - Slow drip CBI restarted   - H/H is stable and vitals are stable   - No need for urgent intervention at this time  - Continue to hold plavix if ok from cardiac standpoint until hematuria resolves   - Titrate CBI to slow drip  - If continues to clear can possibly be discharged home tomorrow with alexis in place and outpatient follow up with Dr. Reza  - Dr. Reza is aware of patient

## 2023-11-10 NOTE — HOSPITAL COURSE
"Ryan Stewart was monitored closely during his hospitalization.  Urology was consulted and recommended placement of 3 way alexis catheter with continuous bladder irrigation.  He was transfused a total of 2 units PRBCs during admission due to drop as low as 7.4. from 12.7 most recent baseline.  His chronic plavix was held. CBI continued, attempt clamp was unsuccessful. Monitoring continued and repeat clamp was successful and urine eventually cleared. Cleared by urology who ok'ed for discharge with close outpatient follow - alexis to remain in place until seen by urology in clinic. Hemoglobin monitored frequently which improved/stabilized at 8.1. He is anxious for discharge as he advises that his wife, who he cares for, is also in the hospital and he needs to "see about her". He is ambulating about the room without issue, conversational, afebrile without leukocytosis, lytes normalized, creatine WNL. Discharge to home with FU appointment in clinic as well referral to outpatient cardiology to discuss Plavix continuation. Vitals stable - discharge to home in stable condition.  "

## 2023-11-10 NOTE — HPI
Ryan Stewart is a 78 y.o. male with hx of HTN, CAD s/p RCA stent (2005), Gilbert's, appendiceal carcinoma.     He presented to the ED yesterday with hematuria and difficulty urinating.     He is s/p TURP in June 2021 with Dr. Reza.     18 Fr 3-way alexis placed in ED and patient has been on CBI requiring manual irrigation.     His H/H dropped from 12.6/37 to 9.7/28.7 while in the ED and he was transfused 1 unit. Stable this am at 10.7/31.    Vitals are currently stable and he is afebrile. No other complaints noted.   Urine culture is in process.

## 2023-11-10 NOTE — SUBJECTIVE & OBJECTIVE
Past Medical History:   Diagnosis Date    BPH with obstruction/lower urinary tract symptoms     Cancer     prostate    Carcinoma of appendix     Coronary artery disease     Elevated PSA     Gilbert's disease 09/28/2021    Hyperlipidemia     Hypertension     MI (myocardial infarction) 2005    Primary appendiceal adenocarcinoma     Stented coronary artery 2005    RCA       Past Surgical History:   Procedure Laterality Date    APPENDECTOMY      CARDIAC SURGERY      stent coronary    COLON SURGERY      CYSTOSCOPY      CYSTOSCOPY N/A 6/2/2021    Procedure: CYSTOSCOPY;  Surgeon: Craig Reza Jr., MD;  Location: [facility] OR;  Service: Urology;  Laterality: N/A;    EAR EXAMINATION UNDER ANESTHESIA      LAPAROSCOPIC APPENDECTOMY N/A 5/6/2021    Procedure: APPENDECTOMY, LAPAROSCOPIC;  Surgeon: Sundar Garcia MD;  Location: [facility] OR;  Service: General;  Laterality: N/A;    LAPAROSCOPIC CHOLECYSTECTOMY N/A 10/10/2021    Procedure: CHOLECYSTECTOMY, LAPAROSCOPIC;  Surgeon: Eulogio Yusuf MD;  Location: [facility] OR;  Service: General;  Laterality: N/A;    SURGICAL REMOVAL OF ILEUM WITH CECUM  5/6/2021    Procedure: EXCISION, CECUM WITH ILEUM;  Surgeon: Sundar Garcia MD;  Location: [facility] OR;  Service: General;;    TRANSRECTAL ULTRASOUND EXAMINATION N/A 6/2/2021    Procedure: ULTRASOUND, RECTAL APPROACH;  Surgeon: Craig Reza Jr., MD;  Location: [facility] OR;  Service: Urology;  Laterality: N/A;    TRANSURETHRAL RESECTION OF PROSTATE N/A 6/22/2021    Procedure: TURP (TRANSURETHRAL RESECTION OF PROSTATE);  Surgeon: Craig Reza Jr., MD;  Location: [facility] OR;  Service: Urology;  Laterality: N/A;       Review of patient's allergies indicates:   Allergen Reactions    Ciprofloxacin      vomiting    Rapaflo [silodosin]      Urinating increased, kidney pain    Simvastatin Other (See Comments)     Severe muscle pain       No current facility-administered medications on file prior to encounter.     Current Outpatient Medications on File Prior  to Encounter   Medication Sig    aspirin (ECOTRIN) 81 MG EC tablet Take 1 tablet (81 mg total) by mouth every morning. Start on     capecitabine (XELODA) 500 MG Tab Takes 3 tablets by mouth 2 times daily for 2 weeks on 1 week off..    carvedilol (COREG) 12.5 MG tablet 12.5 mg 2 (two) times daily.     CHOLECALCIFEROL, VITAMIN D3, ORAL Take 2,000 Units by mouth every morning.     clopidogreL (PLAVIX) 75 mg tablet Take 1 tablet (75 mg total) by mouth every morning. Start on     fish oil-omega-3 fatty acids 300-1,000 mg capsule Take 1 capsule by mouth 2 (two) times daily.     fluticasone (FLONASE) 50 mcg/actuation nasal spray 2 sprays by Each Nostril route daily as needed for Rhinitis.     HYDROcodone-acetaminophen (NORCO) 5-325 mg per tablet Take 1 tablet by mouth every 6 (six) hours as needed for Pain.    lovastatin (MEVACOR) 40 MG tablet Take 40 mg by mouth nightly.     MICARDIS 40 mg Tab Take 40 mg by mouth every morning.     nitrofurantoin, macrocrystal-monohydrate, (MACROBID) 100 MG capsule Take 1 capsule (100 mg total) by mouth 2 (two) times daily. for 14 days    NITROSTAT 0.4 mg SL tablet Place 0.4 mg under the tongue every 5 (five) minutes as needed for Chest pain.     tamsulosin (FLOMAX) 0.4 mg Cap Take 1 capsule (0.4 mg total) by mouth once daily. Take at bedtime    albuterol (PROVENTIL/VENTOLIN HFA) 90 mcg/actuation inhaler Inhale 2 puffs into the lungs every 6 (six) hours as needed for Wheezing or Shortness of Breath. Rescue    amLODIPine (NORVASC) 10 MG tablet Take 1 tablet (10 mg total) by mouth every evening.     Family History    None       Tobacco Use    Smoking status: Former     Current packs/day: 0.00     Types: Cigarettes     Quit date: 1991     Years since quittin.4    Smokeless tobacco: Never    Tobacco comments:     cigars and pipe   Substance and Sexual Activity    Alcohol use: Not Currently    Drug use: Not Currently    Sexual activity: Not on file     Review of  Systems   Constitutional:  Negative for chills, diaphoresis, fatigue and fever.   HENT:  Negative for congestion, ear pain, sore throat and trouble swallowing.    Eyes:  Negative for pain, discharge and visual disturbance.   Respiratory:  Negative for cough, chest tightness, shortness of breath and wheezing.    Cardiovascular:  Negative for chest pain, palpitations and leg swelling.   Gastrointestinal:  Negative for abdominal distention, abdominal pain, blood in stool, constipation, diarrhea, nausea and vomiting.   Endocrine: Negative for polydipsia, polyphagia and polyuria.   Genitourinary:  Positive for difficulty urinating and hematuria. Negative for dysuria, flank pain, frequency and urgency.   Musculoskeletal:  Negative for back pain, joint swelling, neck pain and neck stiffness.   Skin:  Negative for rash and wound.   Allergic/Immunologic: Negative for immunocompromised state.   Neurological:  Positive for light-headedness. Negative for dizziness, syncope, speech difficulty, weakness, numbness and headaches.   Hematological:  Negative for adenopathy.   Psychiatric/Behavioral:  Negative for confusion and suicidal ideas. The patient is not nervous/anxious.    All other systems reviewed and are negative.    Objective:     Vital Signs (Most Recent):  Temp: 98.1 °F (36.7 °C) (11/10/23 0203)  Pulse: 69 (11/10/23 0203)  Resp: 18 (11/10/23 0203)  BP: (!) 106/54 (11/10/23 0203)  SpO2: 100 % (11/10/23 0203) Vital Signs (24h Range):  Temp:  [97.6 °F (36.4 °C)-98.8 °F (37.1 °C)] 98.1 °F (36.7 °C)  Pulse:  [59-93] 69  Resp:  [16-20] 18  SpO2:  [95 %-100 %] 100 %  BP: ()/(42-92) 106/54     Weight: 103.4 kg (228 lb)  Body mass index is 36.8 kg/m².     Physical Exam  Vitals and nursing note reviewed.   Constitutional:       Appearance: He is well-developed.   HENT:      Head: Normocephalic and atraumatic.   Eyes:      Conjunctiva/sclera: Conjunctivae normal.      Pupils: Pupils are equal, round, and reactive to light.    Cardiovascular:      Rate and Rhythm: Normal rate and regular rhythm.      Heart sounds: Normal heart sounds.   Pulmonary:      Effort: Pulmonary effort is normal.      Breath sounds: Normal breath sounds.   Abdominal:      General: Bowel sounds are normal.      Palpations: Abdomen is soft.   Genitourinary:     Comments: Zazueta with CBI draining pink fluid    Musculoskeletal:         General: Normal range of motion.      Cervical back: Normal range of motion and neck supple.   Skin:     General: Skin is warm and dry.      Capillary Refill: Capillary refill takes less than 2 seconds.   Neurological:      Mental Status: He is alert and oriented to person, place, and time.   Psychiatric:         Behavior: Behavior normal.         Thought Content: Thought content normal.         Judgment: Judgment normal.              CRANIAL NERVES     CN III, IV, VI   Pupils are equal, round, and reactive to light.       Significant Labs: All pertinent labs within the past 24 hours have been reviewed.  CBC:   Recent Labs   Lab 11/09/23  1820 11/09/23  2218   WBC 13.38* 12.48   HGB 12.6* 9.7*   HCT 37.0* 28.7*    185     CMP:   Recent Labs   Lab 11/09/23  1820   *   K 4.7      CO2 22*   *   BUN 21   CREATININE 1.0   CALCIUM 8.3*   PROT 6.2   ALBUMIN 3.7   BILITOT 2.1*   ALKPHOS 51*   AST 23   ALT 30   ANIONGAP 9

## 2023-11-10 NOTE — ED NOTES
Manually irrigating 3way, large copious amount of clots removed. Diaphoretic, hypotensive. Dr. Son called to bedside. Placed in trendelenberg, NS 1liter infusing bolus to left hand.

## 2023-11-10 NOTE — HPI
Mr. Stewart is an 78 year old male with a history of HTN, CAD s/p RCA stent (2005), Gilbert's, appendiceal carcinoma, BPH s/p TURP by Dr Craig Reza on 6/2/21 who presents today with complaints of urinary retention. It is severe. It is associated with hematuria. He denies fever, chills, N/V/D, chest pain, or SOB. Symptoms began the prior day and he was seen by urology NP today, urine culture and cytology was collected, and prescribed flomax and macrobid.  He was unfortunately unable to get it filled d/t pharmacy back log. UA not available at this time. Prior urine cultures all with E. Faecalis.  He was unable to urinate prompting his presentation. In the ED, a alexis was placed and irrigated with aspiration of several clots with subsequent gross hematuria. Alexis was replaced with a 3 way alexis and CBI was started as recommended by urology. With aspiration of the large clots, he became diaphoretic, lightheaded, and hypotension that resolved with fluids and likely a vasovagal response. H&H dropped from 12.6/37 to 9.7/28.7 in 4 hours, and he was transfused 1 unit PRBCs. ED MD discussed case with urology, Dr. Flowers. Hospital medicine is consulted for admission.

## 2023-11-10 NOTE — SUBJECTIVE & OBJECTIVE
"Interval History: See "hospital course"      Review of Systems   Constitutional:  Negative for chills and fever.   HENT:  Negative for congestion, ear pain, sore throat and trouble swallowing.    Eyes:  Negative for pain, discharge and visual disturbance.   Respiratory:  Negative for cough and shortness of breath.    Cardiovascular:  Negative for chest pain and palpitations.   Gastrointestinal:  Negative for abdominal distention, abdominal pain, diarrhea, nausea and vomiting.   Genitourinary:  Positive for difficulty urinating and hematuria. Negative for urgency.   Musculoskeletal:  Negative for back pain, joint swelling, neck pain and neck stiffness.   Skin:  Negative for rash and wound.   Allergic/Immunologic: Negative for immunocompromised state.   Neurological:  Positive for light-headedness. Negative for dizziness, weakness, numbness and headaches.   Hematological:  Negative for adenopathy.   Psychiatric/Behavioral:  Negative for confusion and suicidal ideas. The patient is not nervous/anxious.    All other systems reviewed and are negative.    Objective:     Vital Signs (Most Recent):  Temp: 98.3 °F (36.8 °C) (11/10/23 1314)  Pulse: 89 (11/10/23 1314)  Resp: 18 (11/10/23 1314)  BP: (!) 123/58 (11/10/23 1314)  SpO2: 98 % (11/10/23 1314) Vital Signs (24h Range):  Temp:  [97.6 °F (36.4 °C)-98.8 °F (37.1 °C)] 98.3 °F (36.8 °C)  Pulse:  [59-93] 89  Resp:  [16-20] 18  SpO2:  [95 %-100 %] 98 %  BP: ()/(42-91) 123/58     Weight: 103.4 kg (228 lb)  Body mass index is 36.8 kg/m².    Intake/Output Summary (Last 24 hours) at 11/10/2023 1501  Last data filed at 11/10/2023 0618  Gross per 24 hour   Intake 678.33 ml   Output 0 ml   Net 678.33 ml         Physical Exam  Vitals and nursing note reviewed.   Constitutional:       Appearance: He is well-developed.   HENT:      Head: Normocephalic and atraumatic.   Eyes:      Conjunctiva/sclera: Conjunctivae normal.      Pupils: Pupils are equal, round, and reactive to light. "   Cardiovascular:      Rate and Rhythm: Normal rate and regular rhythm.      Heart sounds: Normal heart sounds.   Pulmonary:      Effort: Pulmonary effort is normal. No respiratory distress.      Breath sounds: Normal breath sounds.   Abdominal:      General: Bowel sounds are normal. There is no distension.      Palpations: Abdomen is soft.   Genitourinary:     Comments: Zazueta with CBI draining red fluid at time of assessment    Musculoskeletal:         General: Normal range of motion.      Cervical back: Normal range of motion and neck supple.   Skin:     General: Skin is warm and dry.      Capillary Refill: Capillary refill takes less than 2 seconds.   Neurological:      General: No focal deficit present.      Mental Status: He is alert and oriented to person, place, and time.   Psychiatric:         Mood and Affect: Mood normal.         Behavior: Behavior normal.         Thought Content: Thought content normal.         Judgment: Judgment normal.             Significant Labs: All pertinent labs within the past 24 hours have been reviewed.  CBC:   Recent Labs   Lab 11/09/23 1820 11/09/23 2218 11/10/23  0459 11/10/23  1135   WBC 13.38* 12.48 12.40  --    HGB 12.6* 9.7* 10.7*  10.7* 9.6*   HCT 37.0* 28.7* 31.0*  30.8* 27.7*    185 187  --      CMP:   Recent Labs   Lab 11/09/23  1820 11/10/23  0459   * 135*   K 4.7 4.3    108   CO2 22* 18*   * 162*   BUN 21 25*   CREATININE 1.0 0.9   CALCIUM 8.3* 7.4*   PROT 6.2 5.3*   ALBUMIN 3.7 3.1*   BILITOT 2.1* 2.2*   ALKPHOS 51* 43*   AST 23 16   ALT 30 21   ANIONGAP 9 9       Significant Imaging: I have reviewed all pertinent imaging results/findings within the past 24 hours.

## 2023-11-10 NOTE — ASSESSMENT & PLAN NOTE
Chronic, controlled. Latest blood pressure and vitals reviewed-     Temp:  [97.6 °F (36.4 °C)-98.8 °F (37.1 °C)]   Pulse:  [59-93]   Resp:  [16-20]   BP: ()/(42-91)   SpO2:  [95 %-100 %] .   Home meds for hypertension were reviewed and noted below.   Hypertension Medications             amLODIPine (NORVASC) 10 MG tablet Take 1 tablet (10 mg total) by mouth every evening.    carvedilol (COREG) 12.5 MG tablet 12.5 mg 2 (two) times daily.     MICARDIS 40 mg Tab Take 40 mg by mouth every morning.     NITROSTAT 0.4 mg SL tablet Place 0.4 mg under the tongue every 5 (five) minutes as needed for Chest pain.           While in the hospital, will manage blood pressure as follows; Adjust home antihypertensive regimen as follows- hold for hypotension    Will utilize p.r.n. blood pressure medication only if patient's blood pressure greater than 180/110 and he develops symptoms such as worsening chest pain or shortness of breath.

## 2023-11-10 NOTE — PROGRESS NOTES
"[facility] Medicine  Progress Note    Patient Name: Ryan Stewart  MRN: [id number]  Patient Class: IP- Inpatient   Admission Date: 11/9/2023  Length of Stay: 0 days  Attending Physician: Iza Amin MD  Primary Care Provider: Erik Perry MD        Subjective:     Principal Problem:Gross hematuria        HPI:  Mr. Stewart is an 78 year old male with a history of HTN, CAD s/p RCA stent (2005), Gilbert's, appendiceal carcinoma, BPH s/p TURP by Dr Craig Reza on 6/2/21 who presents today with complaints of urinary retention. It is severe. It is associated with hematuria. He denies fever, chills, N/V/D, chest pain, or SOB. Symptoms began the prior day and he was seen by urology NP today, urine culture and cytology was collected, and prescribed flomax and macrobid.  He was unfortunately unable to get it filled d/t pharmacy back log. UA not available at this time. Prior urine cultures all with E. Faecalis.  He was unable to urinate prompting his presentation. In the ED, a alexis was placed and irrigated with aspiration of several clots with subsequent gross hematuria. Alexis was replaced with a 3 way alexis and CBI was started as recommended by urology. With aspiration of the large clots, he became diaphoretic, lightheaded, and hypotension that resolved with fluids and likely a vasovagal response. H&H dropped from 12.6/37 to 9.7/28.7 in 4 hours, and he was transfused 1 unit PRBCs. ED MD discussed case with urology, Dr. Flowers. Hospital medicine is consulted for admission.       Overview/Hospital Course:  Ryan Stewart was monitored closely during his hospitalization.  Urology was consulted and recommended placement of 3 way alexis catheter with continuous bladder irrigation.        Interval History: See "hospital course"      Review of Systems   Constitutional:  Negative for chills and fever.   HENT:  Negative for congestion, ear pain, sore throat and trouble swallowing.  "   Eyes:  Negative for pain, discharge and visual disturbance.   Respiratory:  Negative for cough and shortness of breath.    Cardiovascular:  Negative for chest pain and palpitations.   Gastrointestinal:  Negative for abdominal distention, abdominal pain, diarrhea, nausea and vomiting.   Genitourinary:  Positive for difficulty urinating and hematuria. Negative for urgency.   Musculoskeletal:  Negative for back pain, joint swelling, neck pain and neck stiffness.   Skin:  Negative for rash and wound.   Allergic/Immunologic: Negative for immunocompromised state.   Neurological:  Positive for light-headedness. Negative for dizziness, weakness, numbness and headaches.   Hematological:  Negative for adenopathy.   Psychiatric/Behavioral:  Negative for confusion and suicidal ideas. The patient is not nervous/anxious.    All other systems reviewed and are negative.    Objective:     Vital Signs (Most Recent):  Temp: 98.3 °F (36.8 °C) (11/10/23 1314)  Pulse: 89 (11/10/23 1314)  Resp: 18 (11/10/23 1314)  BP: (!) 123/58 (11/10/23 1314)  SpO2: 98 % (11/10/23 1314) Vital Signs (24h Range):  Temp:  [97.6 °F (36.4 °C)-98.8 °F (37.1 °C)] 98.3 °F (36.8 °C)  Pulse:  [59-93] 89  Resp:  [16-20] 18  SpO2:  [95 %-100 %] 98 %  BP: ()/(42-91) 123/58     Weight: 103.4 kg (228 lb)  Body mass index is 36.8 kg/m².    Intake/Output Summary (Last 24 hours) at 11/10/2023 1501  Last data filed at 11/10/2023 0618  Gross per 24 hour   Intake 678.33 ml   Output 0 ml   Net 678.33 ml         Physical Exam  Vitals and nursing note reviewed.   Constitutional:       Appearance: He is well-developed.   HENT:      Head: Normocephalic and atraumatic.   Eyes:      Conjunctiva/sclera: Conjunctivae normal.      Pupils: Pupils are equal, round, and reactive to light.   Cardiovascular:      Rate and Rhythm: Normal rate and regular rhythm.      Heart sounds: Normal heart sounds.   Pulmonary:      Effort: Pulmonary effort is normal. No respiratory distress.       Breath sounds: Normal breath sounds.   Abdominal:      General: Bowel sounds are normal. There is no distension.      Palpations: Abdomen is soft.   Genitourinary:     Comments: Alexis with CBI draining red fluid at time of assessment    Musculoskeletal:         General: Normal range of motion.      Cervical back: Normal range of motion and neck supple.   Skin:     General: Skin is warm and dry.      Capillary Refill: Capillary refill takes less than 2 seconds.   Neurological:      General: No focal deficit present.      Mental Status: He is alert and oriented to person, place, and time.   Psychiatric:         Mood and Affect: Mood normal.         Behavior: Behavior normal.         Thought Content: Thought content normal.         Judgment: Judgment normal.             Significant Labs: All pertinent labs within the past 24 hours have been reviewed.  CBC:   Recent Labs   Lab 11/09/23  1820 11/09/23  2218 11/10/23  0459 11/10/23  1135   WBC 13.38* 12.48 12.40  --    HGB 12.6* 9.7* 10.7*  10.7* 9.6*   HCT 37.0* 28.7* 31.0*  30.8* 27.7*    185 187  --      CMP:   Recent Labs   Lab 11/09/23  1820 11/10/23  0459   * 135*   K 4.7 4.3    108   CO2 22* 18*   * 162*   BUN 21 25*   CREATININE 1.0 0.9   CALCIUM 8.3* 7.4*   PROT 6.2 5.3*   ALBUMIN 3.7 3.1*   BILITOT 2.1* 2.2*   ALKPHOS 51* 43*   AST 23 16   ALT 30 21   ANIONGAP 9 9       Significant Imaging: I have reviewed all pertinent imaging results/findings within the past 24 hours.      Assessment/Plan:      * Gross hematuria  Admit to med/tele  Consult urology -called per ED MD  Continue CBI  Trend H&H q6h  Cardiac diet-no urgent plans for intervention  Will cover with vancomycin given history of E. Faecalis   Continue rocephin for now as well, and de-escalate as per C&S  Hold ASA/plavix    Urinary retention  Continue flomax  3way alexis with CBI  Urology consulted      Mixed hyperlipidemia  Continue statin      Hypertension  Chronic,  controlled. Latest blood pressure and vitals reviewed-     Temp:  [97.6 °F (36.4 °C)-98.8 °F (37.1 °C)]   Pulse:  [59-93]   Resp:  [16-20]   BP: ()/(42-91)   SpO2:  [95 %-100 %] .   Home meds for hypertension were reviewed and noted below.   Hypertension Medications             amLODIPine (NORVASC) 10 MG tablet Take 1 tablet (10 mg total) by mouth every evening.    carvedilol (COREG) 12.5 MG tablet 12.5 mg 2 (two) times daily.     MICARDIS 40 mg Tab Take 40 mg by mouth every morning.     NITROSTAT 0.4 mg SL tablet Place 0.4 mg under the tongue every 5 (five) minutes as needed for Chest pain.           While in the hospital, will manage blood pressure as follows; Adjust home antihypertensive regimen as follows- hold for hypotension    Will utilize p.r.n. blood pressure medication only if patient's blood pressure greater than 180/110 and he develops symptoms such as worsening chest pain or shortness of breath.    Acute blood loss anemia  Patient's anemia is currently uncontrolled. Has received 1 units of PRBCs on 11/9/23. Etiology likely d/t acute blood loss which was from gross hematuria  Current CBC reviewed-   Lab Results   Component Value Date    HGB 9.6 (L) 11/10/2023    HCT 27.7 (L) 11/10/2023     Monitor serial CBC and transfuse if patient becomes hemodynamically unstable, symptomatic or H/H drops below 7/21.      VTE Risk Mitigation (From admission, onward)         Ordered     IP VTE HIGH RISK PATIENT  Once         11/10/23 0058     Place sequential compression device  Until discontinued         11/10/23 0058                Discharge Planning   IBAN: 11/12/2023     Code Status: Full Code   Is the patient medically ready for discharge?:     Reason for patient still in hospital (select all that apply): Patient trending condition, Laboratory test, Treatment and Consult recommendations  Discharge Plan A: Home                  SABINA Dawkins  Department of Hospital Medicine   Formerly [facility], [facility]  Med/Surg

## 2023-11-11 LAB
ALBUMIN SERPL BCP-MCNC: 3 G/DL (ref 3.5–5.2)
ALP SERPL-CCNC: 42 U/L (ref 55–135)
ALT SERPL W/O P-5'-P-CCNC: 15 U/L (ref 10–44)
ANION GAP SERPL CALC-SCNC: 8 MMOL/L (ref 8–16)
AST SERPL-CCNC: 13 U/L (ref 10–40)
BASOPHILS # BLD AUTO: 0.05 K/UL (ref 0–0.2)
BASOPHILS NFR BLD: 0.4 % (ref 0–1.9)
BILIRUB SERPL-MCNC: 0.9 MG/DL (ref 0.1–1)
BUN SERPL-MCNC: 33 MG/DL (ref 8–23)
CALCIUM SERPL-MCNC: 7.8 MG/DL (ref 8.7–10.5)
CHLORIDE SERPL-SCNC: 106 MMOL/L (ref 95–110)
CO2 SERPL-SCNC: 23 MMOL/L (ref 23–29)
CREAT SERPL-MCNC: 1 MG/DL (ref 0.5–1.4)
DIFFERENTIAL METHOD: ABNORMAL
EOSINOPHIL # BLD AUTO: 0 K/UL (ref 0–0.5)
EOSINOPHIL NFR BLD: 0.1 % (ref 0–8)
ERYTHROCYTE [DISTWIDTH] IN BLOOD BY AUTOMATED COUNT: 13.5 % (ref 11.5–14.5)
EST. GFR  (NO RACE VARIABLE): >60 ML/MIN/1.73 M^2
GLUCOSE SERPL-MCNC: 113 MG/DL (ref 70–110)
HCT VFR BLD AUTO: 24.8 % (ref 40–54)
HGB BLD-MCNC: 8.2 G/DL (ref 14–18)
IMM GRANULOCYTES # BLD AUTO: 0.08 K/UL (ref 0–0.04)
IMM GRANULOCYTES NFR BLD AUTO: 0.7 % (ref 0–0.5)
LYMPHOCYTES # BLD AUTO: 2 K/UL (ref 1–4.8)
LYMPHOCYTES NFR BLD: 16.7 % (ref 18–48)
MAGNESIUM SERPL-MCNC: 2.1 MG/DL (ref 1.6–2.6)
MCH RBC QN AUTO: 30.7 PG (ref 27–31)
MCHC RBC AUTO-ENTMCNC: 33.1 G/DL (ref 32–36)
MCV RBC AUTO: 93 FL (ref 82–98)
MONOCYTES # BLD AUTO: 1.2 K/UL (ref 0.3–1)
MONOCYTES NFR BLD: 10.2 % (ref 4–15)
NEUTROPHILS # BLD AUTO: 8.7 K/UL (ref 1.8–7.7)
NEUTROPHILS NFR BLD: 71.9 % (ref 38–73)
NRBC BLD-RTO: 0 /100 WBC
PLATELET # BLD AUTO: 145 K/UL (ref 150–450)
PMV BLD AUTO: 10.8 FL (ref 9.2–12.9)
POTASSIUM SERPL-SCNC: 4.3 MMOL/L (ref 3.5–5.1)
PROT SERPL-MCNC: 4.9 G/DL (ref 6–8.4)
RBC # BLD AUTO: 2.67 M/UL (ref 4.6–6.2)
SODIUM SERPL-SCNC: 137 MMOL/L (ref 136–145)
VANCOMYCIN TROUGH SERPL-MCNC: 10 UG/ML (ref 10–22)
WBC # BLD AUTO: 12.03 K/UL (ref 3.9–12.7)

## 2023-11-11 PROCEDURE — 25000003 PHARM REV CODE 250: Performed by: NURSE PRACTITIONER

## 2023-11-11 PROCEDURE — 85025 COMPLETE CBC W/AUTO DIFF WBC: CPT | Performed by: NURSE PRACTITIONER

## 2023-11-11 PROCEDURE — 63600175 PHARM REV CODE 636 W HCPCS: Performed by: INTERNAL MEDICINE

## 2023-11-11 PROCEDURE — 25000003 PHARM REV CODE 250: Performed by: INTERNAL MEDICINE

## 2023-11-11 PROCEDURE — 99900035 HC TECH TIME PER 15 MIN (STAT)

## 2023-11-11 PROCEDURE — 36415 COLL VENOUS BLD VENIPUNCTURE: CPT | Performed by: NURSE PRACTITIONER

## 2023-11-11 PROCEDURE — 80053 COMPREHEN METABOLIC PANEL: CPT | Performed by: NURSE PRACTITIONER

## 2023-11-11 PROCEDURE — 63600175 PHARM REV CODE 636 W HCPCS: Performed by: NURSE PRACTITIONER

## 2023-11-11 PROCEDURE — 83735 ASSAY OF MAGNESIUM: CPT | Performed by: NURSE PRACTITIONER

## 2023-11-11 PROCEDURE — 94761 N-INVAS EAR/PLS OXIMETRY MLT: CPT

## 2023-11-11 PROCEDURE — 80202 ASSAY OF VANCOMYCIN: CPT | Performed by: INTERNAL MEDICINE

## 2023-11-11 PROCEDURE — 25000242 PHARM REV CODE 250 ALT 637 W/ HCPCS: Performed by: NURSE PRACTITIONER

## 2023-11-11 PROCEDURE — 36415 COLL VENOUS BLD VENIPUNCTURE: CPT | Performed by: INTERNAL MEDICINE

## 2023-11-11 PROCEDURE — 11000001 HC ACUTE MED/SURG PRIVATE ROOM

## 2023-11-11 RX ORDER — MUPIROCIN 20 MG/G
OINTMENT TOPICAL 2 TIMES DAILY
Status: DISCONTINUED | OUTPATIENT
Start: 2023-11-11 | End: 2023-11-13 | Stop reason: HOSPADM

## 2023-11-11 RX ORDER — CLOPIDOGREL BISULFATE 75 MG/1
75 TABLET ORAL EVERY MORNING
Start: 2023-11-11 | End: 2023-11-13 | Stop reason: SDUPTHER

## 2023-11-11 RX ADMIN — DOCUSATE SODIUM AND SENNOSIDES 1 TABLET: 8.6; 5 TABLET, FILM COATED ORAL at 09:11

## 2023-11-11 RX ADMIN — MUPIROCIN: 20 OINTMENT TOPICAL at 09:11

## 2023-11-11 RX ADMIN — CEFTRIAXONE SODIUM 1 G: 1 INJECTION, POWDER, FOR SOLUTION INTRAMUSCULAR; INTRAVENOUS at 11:11

## 2023-11-11 RX ADMIN — FLUTICASONE PROPIONATE 100 MCG: 50 SPRAY, METERED NASAL at 09:11

## 2023-11-11 RX ADMIN — TAMSULOSIN HYDROCHLORIDE 0.4 MG: 0.4 CAPSULE ORAL at 09:11

## 2023-11-11 RX ADMIN — VANCOMYCIN HYDROCHLORIDE 1250 MG: 1.25 INJECTION, POWDER, LYOPHILIZED, FOR SOLUTION INTRAVENOUS at 02:11

## 2023-11-11 NOTE — PROGRESS NOTES
[facility] Medicine  Progress Note    Patient Name: Ryan Stewart  MRN: [id number]  Patient Class: IP- Inpatient   Admission Date: 11/9/2023  Length of Stay: 1 days  Attending Physician: Simran Wilcox MD  Primary Care Provider: Erik Perry MD        Subjective:     Principal Problem:Gross hematuria        HPI:  Mr. Stewart is an 78 year old male with a history of HTN, CAD s/p RCA stent (2005), Gilbert's, appendiceal carcinoma, BPH s/p TURP by Dr Craig Reza on 6/2/21 who presents today with complaints of urinary retention. It is severe. It is associated with hematuria. He denies fever, chills, N/V/D, chest pain, or SOB. Symptoms began the prior day and he was seen by urology NP today, urine culture and cytology was collected, and prescribed flomax and macrobid.  He was unfortunately unable to get it filled d/t pharmacy back log. UA not available at this time. Prior urine cultures all with E. Faecalis.  He was unable to urinate prompting his presentation. In the ED, a alexis was placed and irrigated with aspiration of several clots with subsequent gross hematuria. Alexis was replaced with a 3 way alexis and CBI was started as recommended by urology. With aspiration of the large clots, he became diaphoretic, lightheaded, and hypotension that resolved with fluids and likely a vasovagal response. H&H dropped from 12.6/37 to 9.7/28.7 in 4 hours, and he was transfused 1 unit PRBCs. ED MD discussed case with urology, Dr. Flowers. Hospital medicine is consulted for admission.       Overview/Hospital Course:  Ryan Stewart was monitored closely during his hospitalization.  Urology was consulted and recommended placement of 3 way alexis catheter with continuous bladder irrigation.        Interval History: CBI clamped.  Still experiencing some bladder spasms and some leakage around alexis.  Hgb drop noted.  Continue to monitor overnight per urology recommendations.    Review of  Systems   Constitutional:  Negative for chills and fever.   HENT:  Negative for congestion, ear pain, sore throat and trouble swallowing.    Eyes:  Negative for pain, discharge and visual disturbance.   Respiratory:  Negative for cough and shortness of breath.    Cardiovascular:  Negative for chest pain and palpitations.   Gastrointestinal:  Negative for abdominal distention, abdominal pain, diarrhea, nausea and vomiting.   Genitourinary:  Positive for difficulty urinating and hematuria. Negative for urgency.   Musculoskeletal:  Negative for back pain, joint swelling, neck pain and neck stiffness.   Skin:  Negative for rash and wound.   Allergic/Immunologic: Negative for immunocompromised state.   Neurological:  Positive for light-headedness. Negative for dizziness, weakness, numbness and headaches.   Hematological:  Negative for adenopathy.   Psychiatric/Behavioral:  Negative for confusion and suicidal ideas. The patient is not nervous/anxious.    All other systems reviewed and are negative.    Objective:     Vital Signs (Most Recent):  Temp: 98.6 °F (37 °C) (11/11/23 1517)  Pulse: (!) 112 (11/11/23 1517)  Resp: 18 (11/11/23 1517)  BP: 112/80 (11/11/23 1517)  SpO2: 98 % (11/11/23 1517) Vital Signs (24h Range):  Temp:  [97.7 °F (36.5 °C)-98.7 °F (37.1 °C)] 98.6 °F (37 °C)  Pulse:  [] 112  Resp:  [18-19] 18  SpO2:  [97 %-99 %] 98 %  BP: (112-149)/(56-80) 112/80     Weight: 103.4 kg (228 lb)  Body mass index is 36.8 kg/m².    Intake/Output Summary (Last 24 hours) at 11/11/2023 1609  Last data filed at 11/11/2023 0620  Gross per 24 hour   Intake 400 ml   Output -1600 ml   Net 2000 ml         Physical Exam  Vitals and nursing note reviewed.   Constitutional:       Appearance: He is well-developed.   HENT:      Head: Normocephalic and atraumatic.   Eyes:      Conjunctiva/sclera: Conjunctivae normal.      Pupils: Pupils are equal, round, and reactive to light.   Cardiovascular:      Rate and Rhythm: Regular rhythm.  Tachycardia present.      Heart sounds: Normal heart sounds.   Pulmonary:      Effort: Pulmonary effort is normal. No respiratory distress.      Breath sounds: Normal breath sounds.   Abdominal:      General: Bowel sounds are normal. There is no distension.      Palpations: Abdomen is soft.   Genitourinary:     Comments: CBI clamped.  Dark pink fluid to alexis bag    Musculoskeletal:         General: Normal range of motion.      Cervical back: Normal range of motion and neck supple.   Skin:     General: Skin is warm and dry.      Capillary Refill: Capillary refill takes less than 2 seconds.   Neurological:      General: No focal deficit present.      Mental Status: He is alert and oriented to person, place, and time.   Psychiatric:         Mood and Affect: Mood normal.         Behavior: Behavior normal.         Thought Content: Thought content normal.         Judgment: Judgment normal.             Significant Labs: All pertinent labs within the past 24 hours have been reviewed.  CBC:   Recent Labs   Lab 11/09/23  2218 11/10/23  0459 11/10/23  1135 11/10/23  1741 11/11/23  0049   WBC 12.48 12.40  --   --  12.03   HGB 9.7* 10.7*  10.7* 9.6* 8.9* 8.2*   HCT 28.7* 31.0*  30.8* 27.7* 26.6* 24.8*    187  --   --  145*     CMP:   Recent Labs   Lab 11/09/23  1820 11/10/23  0459 11/11/23  0049   * 135* 137   K 4.7 4.3 4.3    108 106   CO2 22* 18* 23   * 162* 113*   BUN 21 25* 33*   CREATININE 1.0 0.9 1.0   CALCIUM 8.3* 7.4* 7.8*   PROT 6.2 5.3* 4.9*   ALBUMIN 3.7 3.1* 3.0*   BILITOT 2.1* 2.2* 0.9   ALKPHOS 51* 43* 42*   AST 23 16 13   ALT 30 21 15   ANIONGAP 9 9 8       Significant Imaging: I have reviewed all pertinent imaging results/findings within the past 24 hours.      Assessment/Plan:      * Gross hematuria  Admit to med/tele  Consult urology -called per ED MD KING-now clamped  Trend H&H q6h  Cardiac diet-no urgent plans for intervention  Will cover with vancomycin given history of E.  Faecalis   Continue rocephin for now as well, and de-escalate as per C&S-no growth  Hold ASA/plavix  D/w Dr. Reza today via secure chat.    Urinary retention  Continue flomax  3way alexis with CBI-now clamped  Urology consulted      Mixed hyperlipidemia  Continue statin      Hypertension  Chronic, controlled. Latest blood pressure and vitals reviewed-     Temp:  [97.6 °F (36.4 °C)-98.8 °F (37.1 °C)]   Pulse:  [59-93]   Resp:  [16-20]   BP: ()/(42-91)   SpO2:  [95 %-100 %] .   Home meds for hypertension were reviewed and noted below.   Hypertension Medications             amLODIPine (NORVASC) 10 MG tablet Take 1 tablet (10 mg total) by mouth every evening.    carvedilol (COREG) 12.5 MG tablet 12.5 mg 2 (two) times daily.     MICARDIS 40 mg Tab Take 40 mg by mouth every morning.     NITROSTAT 0.4 mg SL tablet Place 0.4 mg under the tongue every 5 (five) minutes as needed for Chest pain.           While in the hospital, will manage blood pressure as follows; Adjust home antihypertensive regimen as follows- hold for hypotension    Will utilize p.r.n. blood pressure medication only if patient's blood pressure greater than 180/110 and he develops symptoms such as worsening chest pain or shortness of breath.    Acute blood loss anemia  Patient's anemia is currently uncontrolled. Has received 1 units of PRBCs on 11/9/23. Etiology likely d/t acute blood loss which was from gross hematuria  Current CBC reviewed-   Lab Results   Component Value Date    HGB 8.2 (L) 11/11/2023    HCT 24.8 (L) 11/11/2023     Monitor serial CBC and transfuse if patient becomes hemodynamically unstable, symptomatic or H/H drops below 7/21.      VTE Risk Mitigation (From admission, onward)         Ordered     IP VTE HIGH RISK PATIENT  Once         11/10/23 0058     Place sequential compression device  Until discontinued         11/10/23 0058                Discharge Planning   IBAN: 11/11/2023     Code Status: Full Code   Is the patient  medically ready for discharge?:     Reason for patient still in hospital (select all that apply): Patient trending condition, Laboratory test, Treatment and Consult recommendations  Discharge Plan A: Home                  SABINA Dawkins  Department of Hospital Medicine   [facility]

## 2023-11-11 NOTE — PROGRESS NOTES
Ryan Stewart [id number] is a 78 y.o. male who has been consulted for vancomycin dosing.    Pharmacy consult for vancomycin dosing is no longer required.  Vancomycin was discontinued.    Thank you for allowing us to participate in this patient's care.     Diane Longoria Mai, PharmD

## 2023-11-11 NOTE — ASSESSMENT & PLAN NOTE
Patient's anemia is currently uncontrolled. Has received 1 units of PRBCs on 11/9/23. Etiology likely d/t acute blood loss which was from gross hematuria  Current CBC reviewed-   Lab Results   Component Value Date    HGB 8.2 (L) 11/11/2023    HCT 24.8 (L) 11/11/2023     Monitor serial CBC and transfuse if patient becomes hemodynamically unstable, symptomatic or H/H drops below 7/21.

## 2023-11-11 NOTE — PROGRESS NOTES
Pharmacokinetic Assessment Follow Up: IV Vancomycin    Vancomycin serum concentration assessment(s):    The trough level was drawn correctly and can be used to guide therapy at this time. The measurement is below the desired definitive target range of 15 to 20 mcg/mL.    Vancomycin Regimen Plan:    Change regimen to Vancomycin 1250 mg IV every 12 hours with next serum trough concentration measured at 0100 prior to 3rd dose on 11/12/23    Drug levels (last 3 results):  Recent Labs   Lab Result Units 11/11/23  0049   Vancomycin-Trough ug/mL 10.0       Pharmacy will continue to follow and monitor vancomycin.    Please contact pharmacy at extension [phone] for questions regarding this assessment.    Thank you for the consult,   Jeremy Otero       Patient brief summary:  Ryan Stewart is a 78 y.o. male initiated on antimicrobial therapy with IV Vancomycin for treatment of urinary tract infection    The patient's current regimen is 1000mg q12h    Drug Allergies:   Review of patient's allergies indicates:   Allergen Reactions    Ciprofloxacin      vomiting    Rapaflo [silodosin]      Urinating increased, kidney pain    Simvastatin Other (See Comments)     Severe muscle pain       Actual Body Weight:   103.4kg    Renal Function:   Estimated Creatinine Clearance: 73.7 mL/min (based on SCr of 0.9 mg/dL).,     CBC (last 72 hours):  Recent Labs   Lab Result Units 11/09/23  1820 11/09/23  2218 11/10/23  0459 11/10/23  1135 11/10/23  1741   WBC K/uL 13.38* 12.48 12.40  --   --    Hemoglobin g/dL 12.6* 9.7* 10.7*  10.7* 9.6* 8.9*   Hematocrit % 37.0* 28.7* 31.0*  30.8* 27.7* 26.6*   Platelets K/uL 227 185 187  --   --    Gran % % 85.5* 87.3* 84.0*  --   --    Lymph % % 8.6* 7.5* 10.2*  --   --    Mono % % 4.5 4.2 5.2  --   --    Eosinophil % % 0.3 0.0 0.0  --   --    Basophil % % 0.7 0.4 0.2  --   --    Differential Method  Automated Automated Automated  --   --        Metabolic Panel (last 72 hours):  Recent Labs   Lab  Result Units 11/09/23  1820 11/10/23  0459   Sodium mmol/L 134* 135*   Potassium mmol/L 4.7 4.3   Chloride mmol/L 103 108   CO2 mmol/L 22* 18*   Glucose mg/dL 158* 162*   BUN mg/dL 21 25*   Creatinine mg/dL 1.0 0.9   Albumin g/dL 3.7 3.1*   Total Bilirubin mg/dL 2.1* 2.2*   Alkaline Phosphatase U/L 51* 43*   AST U/L 23 16   ALT U/L 30 21   Magnesium mg/dL  --  1.7       Vancomycin Administrations:  vancomycin given in the last 96 hours                     vancomycin (VANCOCIN) 1,000 mg in dextrose 5 % (D5W) 250 mL IVPB (Vial-Mate) (mg) 1,000 mg New Bag 11/10/23 1317    vancomycin 1,500 mg in dextrose 5 % (D5W) 250 mL IVPB (Vial-Mate) (mg) 1,500 mg New Bag 11/10/23 0101                    Microbiologic Results:  Microbiology Results (last 7 days)       ** No results found for the last 168 hours. **

## 2023-11-11 NOTE — ASSESSMENT & PLAN NOTE
Admit to med/tele  Consult urology -called per ED MD  CBI-now clamped  Trend H&H q6h  Cardiac diet-no urgent plans for intervention  Will cover with vancomycin given history of E. Faecalis   Continue rocephin for now as well, and de-escalate as per C&S-no growth  Hold ASA/plavix  D/w Dr. Reza today via secure chat.

## 2023-11-11 NOTE — DISCHARGE INSTRUCTIONS
Continue to hold plavix until cleared to resume by urology.    Complete medications as ordered  Follow all discharge instructions.  Please schedule follow up appointments as necessary      When to Call Your Doctor  Call your doctor immediately if you have any of the following:  Severe headache  Severe dizziness, or fainting  Nausea or vomiting  Fast heartbeat (higher than 100 beats per minute)  Fever or chills  Swollen ankles  Weakness  Chest Pain attacks that last longer, occur more often, or are more severe than in the past

## 2023-11-11 NOTE — PLAN OF CARE
Problem: Infection  Goal: Absence of Infection Signs and Symptoms  Outcome: Ongoing, Progressing     Problem: Anemia  Goal: Anemia Symptom Improvement  Outcome: Ongoing, Progressing     Problem: Fluid Volume Deficit  Goal: Fluid Balance  Outcome: Ongoing, Progressing  Pt remained stable this shift. CBI alexis remains patent and intact, draining pink, clear urine to gravity with no difficulties. Few clots visible in catheter at beginning of shift. Irrigated as needed. Antibiotics administered per order. Safety and rounding maintained throughout shift. NP was notified of calcium 7.8, no new orders at this time.

## 2023-11-11 NOTE — PLAN OF CARE
Problem: Adult Inpatient Plan of Care  Goal: Plan of Care Review  Outcome: Ongoing, Progressing     Problem: Adult Inpatient Plan of Care  Goal: Optimal Comfort and Wellbeing  Outcome: Ongoing, Progressing     Pt rested in bed this shift. Mild complaints of pain managed with prn medication. CBI flowing well after Fr size change. Currently at a slow rate with koolaid tinged urine output. Small amount of small clots noted. ABX administered as scheduled, afebrile this shift. Continuous cardiac monitoring in place. Magnesium replaced this shift. Safety maintained. All needs attended to.

## 2023-11-11 NOTE — PLAN OF CARE
Pt clear for DC from CM standpoint. Discharging home.     11/11/23 1410   Final Note   Assessment Type Final Discharge Note   Anticipated Discharge Disposition Home

## 2023-11-11 NOTE — NURSING
Pt CBI draining dark red. Clamp wide open. Fluid leaking around tip of penis. Hand irrigation until light red. Many clots of varying sizes noted. CBI reconnected and return of leakage noted. Dr. Flowers notified. New orders received to hold CBI and continue to hand irrigate.

## 2023-11-11 NOTE — SUBJECTIVE & OBJECTIVE
Interval History: CBI clamped.  Still experiencing some bladder spasms and some leakage around alexis.  Hgb drop noted.  Continue to monitor overnight per urology recommendations.    Review of Systems   Constitutional:  Negative for chills and fever.   HENT:  Negative for congestion, ear pain, sore throat and trouble swallowing.    Eyes:  Negative for pain, discharge and visual disturbance.   Respiratory:  Negative for cough and shortness of breath.    Cardiovascular:  Negative for chest pain and palpitations.   Gastrointestinal:  Negative for abdominal distention, abdominal pain, diarrhea, nausea and vomiting.   Genitourinary:  Positive for difficulty urinating and hematuria. Negative for urgency.   Musculoskeletal:  Negative for back pain, joint swelling, neck pain and neck stiffness.   Skin:  Negative for rash and wound.   Allergic/Immunologic: Negative for immunocompromised state.   Neurological:  Positive for light-headedness. Negative for dizziness, weakness, numbness and headaches.   Hematological:  Negative for adenopathy.   Psychiatric/Behavioral:  Negative for confusion and suicidal ideas. The patient is not nervous/anxious.    All other systems reviewed and are negative.    Objective:     Vital Signs (Most Recent):  Temp: 98.6 °F (37 °C) (11/11/23 1517)  Pulse: (!) 112 (11/11/23 1517)  Resp: 18 (11/11/23 1517)  BP: 112/80 (11/11/23 1517)  SpO2: 98 % (11/11/23 1517) Vital Signs (24h Range):  Temp:  [97.7 °F (36.5 °C)-98.7 °F (37.1 °C)] 98.6 °F (37 °C)  Pulse:  [] 112  Resp:  [18-19] 18  SpO2:  [97 %-99 %] 98 %  BP: (112-149)/(56-80) 112/80     Weight: 103.4 kg (228 lb)  Body mass index is 36.8 kg/m².    Intake/Output Summary (Last 24 hours) at 11/11/2023 1609  Last data filed at 11/11/2023 0620  Gross per 24 hour   Intake 400 ml   Output -1600 ml   Net 2000 ml         Physical Exam  Vitals and nursing note reviewed.   Constitutional:       Appearance: He is well-developed.   HENT:      Head:  Normocephalic and atraumatic.   Eyes:      Conjunctiva/sclera: Conjunctivae normal.      Pupils: Pupils are equal, round, and reactive to light.   Cardiovascular:      Rate and Rhythm: Regular rhythm. Tachycardia present.      Heart sounds: Normal heart sounds.   Pulmonary:      Effort: Pulmonary effort is normal. No respiratory distress.      Breath sounds: Normal breath sounds.   Abdominal:      General: Bowel sounds are normal. There is no distension.      Palpations: Abdomen is soft.   Genitourinary:     Comments: CBI clamped.  Dark pink fluid to alexis bag    Musculoskeletal:         General: Normal range of motion.      Cervical back: Normal range of motion and neck supple.   Skin:     General: Skin is warm and dry.      Capillary Refill: Capillary refill takes less than 2 seconds.   Neurological:      General: No focal deficit present.      Mental Status: He is alert and oriented to person, place, and time.   Psychiatric:         Mood and Affect: Mood normal.         Behavior: Behavior normal.         Thought Content: Thought content normal.         Judgment: Judgment normal.             Significant Labs: All pertinent labs within the past 24 hours have been reviewed.  CBC:   Recent Labs   Lab 11/09/23  2218 11/10/23  0459 11/10/23  1135 11/10/23  1741 11/11/23  0049   WBC 12.48 12.40  --   --  12.03   HGB 9.7* 10.7*  10.7* 9.6* 8.9* 8.2*   HCT 28.7* 31.0*  30.8* 27.7* 26.6* 24.8*    187  --   --  145*     CMP:   Recent Labs   Lab 11/09/23  1820 11/10/23  0459 11/11/23  0049   * 135* 137   K 4.7 4.3 4.3    108 106   CO2 22* 18* 23   * 162* 113*   BUN 21 25* 33*   CREATININE 1.0 0.9 1.0   CALCIUM 8.3* 7.4* 7.8*   PROT 6.2 5.3* 4.9*   ALBUMIN 3.7 3.1* 3.0*   BILITOT 2.1* 2.2* 0.9   ALKPHOS 51* 43* 42*   AST 23 16 13   ALT 30 21 15   ANIONGAP 9 9 8       Significant Imaging: I have reviewed all pertinent imaging results/findings within the past 24 hours.

## 2023-11-12 LAB
ALBUMIN SERPL BCP-MCNC: 3 G/DL (ref 3.5–5.2)
ALP SERPL-CCNC: 39 U/L (ref 55–135)
ALT SERPL W/O P-5'-P-CCNC: 16 U/L (ref 10–44)
ANION GAP SERPL CALC-SCNC: 6 MMOL/L (ref 8–16)
AST SERPL-CCNC: 17 U/L (ref 10–40)
BASOPHILS # BLD AUTO: 0.05 K/UL (ref 0–0.2)
BASOPHILS NFR BLD: 0.6 % (ref 0–1.9)
BILIRUB SERPL-MCNC: 1 MG/DL (ref 0.1–1)
BLD PROD TYP BPU: NORMAL
BLOOD UNIT EXPIRATION DATE: NORMAL
BLOOD UNIT TYPE CODE: 5100
BLOOD UNIT TYPE: NORMAL
BUN SERPL-MCNC: 16 MG/DL (ref 8–23)
CALCIUM SERPL-MCNC: 7.8 MG/DL (ref 8.7–10.5)
CHLORIDE SERPL-SCNC: 106 MMOL/L (ref 95–110)
CO2 SERPL-SCNC: 25 MMOL/L (ref 23–29)
CODING SYSTEM: NORMAL
CREAT SERPL-MCNC: 0.8 MG/DL (ref 0.5–1.4)
CROSSMATCH INTERPRETATION: NORMAL
DIFFERENTIAL METHOD: ABNORMAL
DISPENSE STATUS: NORMAL
EOSINOPHIL # BLD AUTO: 0 K/UL (ref 0–0.5)
EOSINOPHIL NFR BLD: 0.2 % (ref 0–8)
ERYTHROCYTE [DISTWIDTH] IN BLOOD BY AUTOMATED COUNT: 13.3 % (ref 11.5–14.5)
EST. GFR  (NO RACE VARIABLE): >60 ML/MIN/1.73 M^2
FINAL PATHOLOGIC DIAGNOSIS: NORMAL
GLUCOSE SERPL-MCNC: 108 MG/DL (ref 70–110)
HCT VFR BLD AUTO: 22 % (ref 40–54)
HGB BLD-MCNC: 7.4 G/DL (ref 14–18)
IMM GRANULOCYTES # BLD AUTO: 0.07 K/UL (ref 0–0.04)
IMM GRANULOCYTES NFR BLD AUTO: 0.8 % (ref 0–0.5)
LYMPHOCYTES # BLD AUTO: 1.7 K/UL (ref 1–4.8)
LYMPHOCYTES NFR BLD: 19.6 % (ref 18–48)
Lab: NORMAL
MAGNESIUM SERPL-MCNC: 2 MG/DL (ref 1.6–2.6)
MCH RBC QN AUTO: 30.5 PG (ref 27–31)
MCHC RBC AUTO-ENTMCNC: 33.6 G/DL (ref 32–36)
MCV RBC AUTO: 91 FL (ref 82–98)
MICROSCOPIC EXAM: NORMAL
MONOCYTES # BLD AUTO: 0.8 K/UL (ref 0.3–1)
MONOCYTES NFR BLD: 8.9 % (ref 4–15)
NEUTROPHILS # BLD AUTO: 6.2 K/UL (ref 1.8–7.7)
NEUTROPHILS NFR BLD: 69.9 % (ref 38–73)
NRBC BLD-RTO: 0 /100 WBC
NUM UNITS TRANS PACKED RBC: NORMAL
PLATELET # BLD AUTO: 128 K/UL (ref 150–450)
PMV BLD AUTO: 10.3 FL (ref 9.2–12.9)
POTASSIUM SERPL-SCNC: 3.9 MMOL/L (ref 3.5–5.1)
PROT SERPL-MCNC: 5.2 G/DL (ref 6–8.4)
RBC # BLD AUTO: 2.43 M/UL (ref 4.6–6.2)
SODIUM SERPL-SCNC: 137 MMOL/L (ref 136–145)
WBC # BLD AUTO: 8.87 K/UL (ref 3.9–12.7)

## 2023-11-12 PROCEDURE — 11000001 HC ACUTE MED/SURG PRIVATE ROOM

## 2023-11-12 PROCEDURE — 99233 PR SUBSEQUENT HOSPITAL CARE,LEVL III: ICD-10-PCS | Mod: ,,, | Performed by: UROLOGY

## 2023-11-12 PROCEDURE — 80053 COMPREHEN METABOLIC PANEL: CPT | Performed by: NURSE PRACTITIONER

## 2023-11-12 PROCEDURE — 85025 COMPLETE CBC W/AUTO DIFF WBC: CPT | Performed by: NURSE PRACTITIONER

## 2023-11-12 PROCEDURE — 99900035 HC TECH TIME PER 15 MIN (STAT)

## 2023-11-12 PROCEDURE — 36430 TRANSFUSION BLD/BLD COMPNT: CPT

## 2023-11-12 PROCEDURE — 36415 COLL VENOUS BLD VENIPUNCTURE: CPT | Performed by: NURSE PRACTITIONER

## 2023-11-12 PROCEDURE — 63600175 PHARM REV CODE 636 W HCPCS: Performed by: NURSE PRACTITIONER

## 2023-11-12 PROCEDURE — P9016 RBC LEUKOCYTES REDUCED: HCPCS | Performed by: NURSE PRACTITIONER

## 2023-11-12 PROCEDURE — 83735 ASSAY OF MAGNESIUM: CPT | Performed by: NURSE PRACTITIONER

## 2023-11-12 PROCEDURE — 94761 N-INVAS EAR/PLS OXIMETRY MLT: CPT

## 2023-11-12 PROCEDURE — 99233 SBSQ HOSP IP/OBS HIGH 50: CPT | Mod: ,,, | Performed by: UROLOGY

## 2023-11-12 PROCEDURE — 86920 COMPATIBILITY TEST SPIN: CPT | Performed by: NURSE PRACTITIONER

## 2023-11-12 PROCEDURE — 25000003 PHARM REV CODE 250: Performed by: NURSE PRACTITIONER

## 2023-11-12 PROCEDURE — 25000003 PHARM REV CODE 250: Performed by: INTERNAL MEDICINE

## 2023-11-12 RX ORDER — HYDROCODONE BITARTRATE AND ACETAMINOPHEN 500; 5 MG/1; MG/1
TABLET ORAL
Status: DISCONTINUED | OUTPATIENT
Start: 2023-11-12 | End: 2023-11-13 | Stop reason: HOSPADM

## 2023-11-12 RX ADMIN — CEFTRIAXONE SODIUM 1 G: 1 INJECTION, POWDER, FOR SOLUTION INTRAMUSCULAR; INTRAVENOUS at 09:11

## 2023-11-12 RX ADMIN — DOCUSATE SODIUM AND SENNOSIDES 1 TABLET: 8.6; 5 TABLET, FILM COATED ORAL at 09:11

## 2023-11-12 RX ADMIN — MUPIROCIN: 20 OINTMENT TOPICAL at 09:11

## 2023-11-12 RX ADMIN — MUPIROCIN: 20 OINTMENT TOPICAL at 10:11

## 2023-11-12 RX ADMIN — FLUTICASONE PROPIONATE 100 MCG: 50 SPRAY, METERED NASAL at 10:11

## 2023-11-12 RX ADMIN — DOCUSATE SODIUM AND SENNOSIDES 1 TABLET: 8.6; 5 TABLET, FILM COATED ORAL at 10:11

## 2023-11-12 RX ADMIN — TAMSULOSIN HYDROCHLORIDE 0.4 MG: 0.4 CAPSULE ORAL at 10:11

## 2023-11-12 NOTE — PROGRESS NOTES
[facility] Urology Consult Progress Note:    Ryan Stewart is a 78 y.o. male who presents for gross hematuria.    Patient with a history of elevated PSA, gross hematuria and enlarged prostate previously managed with Dr. Quintanilla. He presented to [facility] ED with urinary retention after appendectomy on 5/6/21 for ruptured appendicitis. 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     Alexis catheter placed with 1.5L urine. CT renal stone on 5/17/21 revealed a significantly enlarged prostate.  He underwent voiding trial in clinic with NP Eliana Baez, but had the alexis replaced. He has since developed gross hematuria. He states that prior to his appendectomy he was voiding well. He has been on Flomax for several years. Now on Finasteride.     On review of records, he has a history of elevated PSA s/p biopsy in [place] in 2002. Declined any further biopsies. He is on Plavix for history of MI.        Interval History     7/22/21: Patient with a 170 cc prostate s/p TURP on 6/22/21. Pathology negative. Discharged home POD 2. Underwent successful voiding trial 1 week post-op. States his lower urinary tract symptoms have improved significantly. Remains on Flomax and Finasteride.      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      1/21/22: Patient is doing well. Continues to void fine with mild lower urinary tract symptoms. IPSS 4. QoL 0. PVR 26 mL. Discontinued Flomax and Finasteride.     2/22/23: Here for follow up. Doing well with no complaints. IPSS remains at 4. QoL 0. PVR 71 mL. He underwent CT abdomen pelvis with contrast which showed an enlarged prostate, 13 mm lesion on the left kidney - not changed in size and fatty liver.    11/12/23: Patient presented to the emergency department on 11/9/23 with gross hematuria. 18 Cypriot alexis placed and started on CBI. H/H dropped. Evaluated by Dr. Flowers who recommended up-sizing catheter and restarting CBI. Attempted to clamp  irrigation yesterday, but hematuria recurred. Urine culture negative.      Denies any fever, chills, flank pain, nephrolithiasis,  trauma or history of  malignancy.         PSA  5.3                   11/3/20  4.7                   8/19/19  5.3                   9/4/18  6.0                   7/8/16  8.9                   9/4/13     Urine culture  No growth 11/12/23  Enterococcus  6/2/21  Enterococcus  5/6/21     IPSS    QoL  4          0          2/22/23  6          1          7/22/21     PVR  71 mL              2/22/23  26 mL              1/21/22  0 mL                7/22/21  109 mL            6/29/21    Past Medical History:   Diagnosis Date    BPH with obstruction/lower urinary tract symptoms     Cancer     prostate    Carcinoma of appendix     Coronary artery disease     Elevated PSA     Gilbert's disease 09/28/2021    Hyperlipidemia     Hypertension     MI (myocardial infarction) 2005    Primary appendiceal adenocarcinoma     Stented coronary artery 2005    RCA       Past Surgical History:   Procedure Laterality Date    APPENDECTOMY      CARDIAC SURGERY      stent coronary    COLON SURGERY      CYSTOSCOPY      CYSTOSCOPY N/A 6/2/2021    Procedure: CYSTOSCOPY;  Surgeon: Craig Reza Jr., MD;  Location: [facility] OR;  Service: Urology;  Laterality: N/A;    EAR EXAMINATION UNDER ANESTHESIA      LAPAROSCOPIC APPENDECTOMY N/A 5/6/2021    Procedure: APPENDECTOMY, LAPAROSCOPIC;  Surgeon: Sundar Garcia MD;  Location: [facility] OR;  Service: General;  Laterality: N/A;    LAPAROSCOPIC CHOLECYSTECTOMY N/A 10/10/2021    Procedure: CHOLECYSTECTOMY, LAPAROSCOPIC;  Surgeon: Eulogio Yusuf MD;  Location: [facility] OR;  Service: General;  Laterality: N/A;    SURGICAL REMOVAL OF ILEUM WITH CECUM  5/6/2021    Procedure: EXCISION, CECUM WITH ILEUM;  Surgeon: Sundar Garcia MD;  Location: [facility] OR;  Service: General;;    TRANSRECTAL ULTRASOUND EXAMINATION N/A 6/2/2021    Procedure: ULTRASOUND, RECTAL APPROACH;  Surgeon: Craig Reza  MD Maryam;  [facility];  Service: Urology;  Laterality: N/A;    TRANSURETHRAL RESECTION OF PROSTATE N/A 6/22/2021    Procedure: TURP (TRANSURETHRAL RESECTION OF PROSTATE);  Surgeon: Craig Reza Jr., MD;  Location: [facility] OR;  Service: Urology;  Laterality: N/A;       Review of patient's allergies indicates:   Allergen Reactions    Ciprofloxacin      vomiting    Rapaflo [silodosin]      Urinating increased, kidney pain    Simvastatin Other (See Comments)     Severe muscle pain       Medications Reviewed: see MAR    FOCUSED PHYSICAL EXAM:    Vitals:    11/12/23 1231   BP: (!) 155/68   Pulse: 86   Resp: 18   Temp: 98.9 °F (37.2 °C)     Body mass index is 36.8 kg/m².       General: Alert, cooperative, no distress, appears stated age  Abdomen: Soft, non-tender, no CVA tenderness, non-distended  : 24 Amharic 3-way alexis in place with pink urine on slow CBI      LABS:    Recent Results (from the past 336 hour(s))   Urine culture    Collection Time: 11/09/23  1:20 PM    Specimen: Urine, Clean Catch   Result Value Ref Range    Urine Culture, Routine No growth    Cytology, Urine    Collection Time: 11/09/23  1:21 PM   Result Value Ref Range    Final Pathologic Diagnosis       Urine, voided, cytologic evaluation:  Negative for high grade urothelial carcinoma.  Red blood cells present      Microscopic Exam Microscopic examination performed.     Disclaimer       Screening was performed at [facility], [address] [place] [address].   CBC auto differential    Collection Time: 11/09/23  6:20 PM   Result Value Ref Range    WBC 13.38 (H) 3.90 - 12.70 K/uL    RBC 4.04 (L) 4.60 - 6.20 M/uL    Hemoglobin 12.6 (L) 14.0 - 18.0 g/dL    Hematocrit 37.0 (L) 40.0 - 54.0 %    MCV 92 82 - 98 fL    MCH 31.2 (H) 27.0 - 31.0 pg    MCHC 34.1 32.0 - 36.0 g/dL    RDW 12.4 11.5 - 14.5 %    Platelets 227 150 - 450 K/uL    MPV 10.0 9.2 - 12.9 fL    Immature Granulocytes 0.4 0.0 - 0.5 %     Gran # (ANC) 11.5 (H) 1.8 - 7.7 K/uL    Immature Grans (Abs) 0.05 (H) 0.00 - 0.04 K/uL    Lymph # 1.2 1.0 - 4.8 K/uL    Mono # 0.6 0.3 - 1.0 K/uL    Eos # 0.0 0.0 - 0.5 K/uL    Baso # 0.09 0.00 - 0.20 K/uL    nRBC 0 0 /100 WBC    Gran % 85.5 (H) 38.0 - 73.0 %    Lymph % 8.6 (L) 18.0 - 48.0 %    Mono % 4.5 4.0 - 15.0 %    Eosinophil % 0.3 0.0 - 8.0 %    Basophil % 0.7 0.0 - 1.9 %    Differential Method Automated    Comprehensive metabolic panel    Collection Time: 11/09/23  6:20 PM   Result Value Ref Range    Sodium 134 (L) 136 - 145 mmol/L    Potassium 4.7 3.5 - 5.1 mmol/L    Chloride 103 95 - 110 mmol/L    CO2 22 (L) 23 - 29 mmol/L    Glucose 158 (H) 70 - 110 mg/dL    BUN 21 8 - 23 mg/dL    Creatinine 1.0 0.5 - 1.4 mg/dL    Calcium 8.3 (L) 8.7 - 10.5 mg/dL    Total Protein 6.2 6.0 - 8.4 g/dL    Albumin 3.7 3.5 - 5.2 g/dL    Total Bilirubin 2.1 (H) 0.1 - 1.0 mg/dL    Alkaline Phosphatase 51 (L) 55 - 135 U/L    AST 23 10 - 40 U/L    ALT 30 10 - 44 U/L    eGFR >60 >60 mL/min/1.73 m^2    Anion Gap 9 8 - 16 mmol/L   CBC Auto Differential    Collection Time: 11/09/23 10:18 PM   Result Value Ref Range    WBC 12.48 3.90 - 12.70 K/uL    RBC 3.08 (L) 4.60 - 6.20 M/uL    Hemoglobin 9.7 (L) 14.0 - 18.0 g/dL    Hematocrit 28.7 (L) 40.0 - 54.0 %    MCV 93 82 - 98 fL    MCH 31.5 (H) 27.0 - 31.0 pg    MCHC 33.8 32.0 - 36.0 g/dL    RDW 12.4 11.5 - 14.5 %    Platelets 185 150 - 450 K/uL    MPV 10.1 9.2 - 12.9 fL    Immature Granulocytes 0.6 (H) 0.0 - 0.5 %    Gran # (ANC) 10.9 (H) 1.8 - 7.7 K/uL    Immature Grans (Abs) 0.08 (H) 0.00 - 0.04 K/uL    Lymph # 0.9 (L) 1.0 - 4.8 K/uL    Mono # 0.5 0.3 - 1.0 K/uL    Eos # 0.0 0.0 - 0.5 K/uL    Baso # 0.05 0.00 - 0.20 K/uL    nRBC 0 0 /100 WBC    Gran % 87.3 (H) 38.0 - 73.0 %    Lymph % 7.5 (L) 18.0 - 48.0 %    Mono % 4.2 4.0 - 15.0 %    Eosinophil % 0.0 0.0 - 8.0 %    Basophil % 0.4 0.0 - 1.9 %    Differential Method Automated    Type & Screen    Collection Time: 11/09/23 10:18 PM   Result  Value Ref Range    Group & Rh O POS     Indirect Clive NEG     Specimen Outdate 11/12/2023 23:59    Prepare RBC 1 Unit    Collection Time: 11/09/23 10:18 PM   Result Value Ref Range    UNIT NUMBER [id number]     Product Code A7630P56     DISPENSE STATUS TRANSFUSED     CODING SYSTEM [id number]     Unit Blood Type Code 5100     Unit Blood Type O POS     Unit Expiration [id number]     CROSSMATCH INTERPRETATION Compatible    Prepare RBC 1 Unit    Collection Time: 11/09/23 10:18 PM   Result Value Ref Range    UNIT NUMBER [id number]     Product Code G7652Y14     DISPENSE STATUS ISSUED     CODING SYSTEM [id number]     Unit Blood Type Code 5100     Unit Blood Type O POS     Unit Expiration [id number]     CROSSMATCH INTERPRETATION Compatible    Comprehensive Metabolic Panel (CMP)    Collection Time: 11/10/23  4:59 AM   Result Value Ref Range    Sodium 135 (L) 136 - 145 mmol/L    Potassium 4.3 3.5 - 5.1 mmol/L    Chloride 108 95 - 110 mmol/L    CO2 18 (L) 23 - 29 mmol/L    Glucose 162 (H) 70 - 110 mg/dL    BUN 25 (H) 8 - 23 mg/dL    Creatinine 0.9 0.5 - 1.4 mg/dL    Calcium 7.4 (L) 8.7 - 10.5 mg/dL    Total Protein 5.3 (L) 6.0 - 8.4 g/dL    Albumin 3.1 (L) 3.5 - 5.2 g/dL    Total Bilirubin 2.2 (H) 0.1 - 1.0 mg/dL    Alkaline Phosphatase 43 (L) 55 - 135 U/L    AST 16 10 - 40 U/L    ALT 21 10 - 44 U/L    eGFR >60 >60 mL/min/1.73 m^2    Anion Gap 9 8 - 16 mmol/L   Magnesium    Collection Time: 11/10/23  4:59 AM   Result Value Ref Range    Magnesium 1.7 1.6 - 2.6 mg/dL   CBC with Automated Differential    Collection Time: 11/10/23  4:59 AM   Result Value Ref Range    WBC 12.40 3.90 - 12.70 K/uL    RBC 3.41 (L) 4.60 - 6.20 M/uL    Hemoglobin 10.7 (L) 14.0 - 18.0 g/dL    Hematocrit 30.8 (L) 40.0 - 54.0 %    MCV 90 82 - 98 fL    MCH 31.4 (H) 27.0 - 31.0 pg    MCHC 34.7 32.0 - 36.0 g/dL    RDW 12.9 11.5 - 14.5 %    Platelets 187 150 - 450 K/uL    MPV 10.5 9.2 - 12.9 fL    Immature Granulocytes 0.4 0.0 - 0.5 %    Gran # (ANC)  10.4 (H) 1.8 - 7.7 K/uL    Immature Grans (Abs) 0.05 (H) 0.00 - 0.04 K/uL    Lymph # 1.3 1.0 - 4.8 K/uL    Mono # 0.6 0.3 - 1.0 K/uL    Eos # 0.0 0.0 - 0.5 K/uL    Baso # 0.03 0.00 - 0.20 K/uL    nRBC 0 0 /100 WBC    Gran % 84.0 (H) 38.0 - 73.0 %    Lymph % 10.2 (L) 18.0 - 48.0 %    Mono % 5.2 4.0 - 15.0 %    Eosinophil % 0.0 0.0 - 8.0 %    Basophil % 0.2 0.0 - 1.9 %    Differential Method Automated    Hemoglobin    Collection Time: 11/10/23  4:59 AM   Result Value Ref Range    Hemoglobin 10.7 (L) 14.0 - 18.0 g/dL   Hematocrit    Collection Time: 11/10/23  4:59 AM   Result Value Ref Range    Hematocrit 31.0 (L) 40.0 - 54.0 %   Hemoglobin    Collection Time: 11/10/23 11:35 AM   Result Value Ref Range    Hemoglobin 9.6 (L) 14.0 - 18.0 g/dL   Hematocrit    Collection Time: 11/10/23 11:35 AM   Result Value Ref Range    Hematocrit 27.7 (L) 40.0 - 54.0 %   Hemoglobin    Collection Time: 11/10/23  5:41 PM   Result Value Ref Range    Hemoglobin 8.9 (L) 14.0 - 18.0 g/dL   Hematocrit    Collection Time: 11/10/23  5:41 PM   Result Value Ref Range    Hematocrit 26.6 (L) 40.0 - 54.0 %   VANCOMYCIN, TROUGH    Collection Time: 11/11/23 12:49 AM   Result Value Ref Range    Vancomycin-Trough 10.0 10.0 - 22.0 ug/mL   Comprehensive Metabolic Panel (CMP)    Collection Time: 11/11/23 12:49 AM   Result Value Ref Range    Sodium 137 136 - 145 mmol/L    Potassium 4.3 3.5 - 5.1 mmol/L    Chloride 106 95 - 110 mmol/L    CO2 23 23 - 29 mmol/L    Glucose 113 (H) 70 - 110 mg/dL    BUN 33 (H) 8 - 23 mg/dL    Creatinine 1.0 0.5 - 1.4 mg/dL    Calcium 7.8 (L) 8.7 - 10.5 mg/dL    Total Protein 4.9 (L) 6.0 - 8.4 g/dL    Albumin 3.0 (L) 3.5 - 5.2 g/dL    Total Bilirubin 0.9 0.1 - 1.0 mg/dL    Alkaline Phosphatase 42 (L) 55 - 135 U/L    AST 13 10 - 40 U/L    ALT 15 10 - 44 U/L    eGFR >60 >60 mL/min/1.73 m^2    Anion Gap 8 8 - 16 mmol/L   Magnesium    Collection Time: 11/11/23 12:49 AM   Result Value Ref Range    Magnesium 2.1 1.6 - 2.6 mg/dL   CBC  with Automated Differential    Collection Time: 11/11/23 12:49 AM   Result Value Ref Range    WBC 12.03 3.90 - 12.70 K/uL    RBC 2.67 (L) 4.60 - 6.20 M/uL    Hemoglobin 8.2 (L) 14.0 - 18.0 g/dL    Hematocrit 24.8 (L) 40.0 - 54.0 %    MCV 93 82 - 98 fL    MCH 30.7 27.0 - 31.0 pg    MCHC 33.1 32.0 - 36.0 g/dL    RDW 13.5 11.5 - 14.5 %    Platelets 145 (L) 150 - 450 K/uL    MPV 10.8 9.2 - 12.9 fL    Immature Granulocytes 0.7 (H) 0.0 - 0.5 %    Gran # (ANC) 8.7 (H) 1.8 - 7.7 K/uL    Immature Grans (Abs) 0.08 (H) 0.00 - 0.04 K/uL    Lymph # 2.0 1.0 - 4.8 K/uL    Mono # 1.2 (H) 0.3 - 1.0 K/uL    Eos # 0.0 0.0 - 0.5 K/uL    Baso # 0.05 0.00 - 0.20 K/uL    nRBC 0 0 /100 WBC    Gran % 71.9 38.0 - 73.0 %    Lymph % 16.7 (L) 18.0 - 48.0 %    Mono % 10.2 4.0 - 15.0 %    Eosinophil % 0.1 0.0 - 8.0 %    Basophil % 0.4 0.0 - 1.9 %    Differential Method Automated    Comprehensive Metabolic Panel (CMP)    Collection Time: 11/12/23  4:38 AM   Result Value Ref Range    Sodium 137 136 - 145 mmol/L    Potassium 3.9 3.5 - 5.1 mmol/L    Chloride 106 95 - 110 mmol/L    CO2 25 23 - 29 mmol/L    Glucose 108 70 - 110 mg/dL    BUN 16 8 - 23 mg/dL    Creatinine 0.8 0.5 - 1.4 mg/dL    Calcium 7.8 (L) 8.7 - 10.5 mg/dL    Total Protein 5.2 (L) 6.0 - 8.4 g/dL    Albumin 3.0 (L) 3.5 - 5.2 g/dL    Total Bilirubin 1.0 0.1 - 1.0 mg/dL    Alkaline Phosphatase 39 (L) 55 - 135 U/L    AST 17 10 - 40 U/L    ALT 16 10 - 44 U/L    eGFR >60 >60 mL/min/1.73 m^2    Anion Gap 6 (L) 8 - 16 mmol/L   Magnesium    Collection Time: 11/12/23  4:38 AM   Result Value Ref Range    Magnesium 2.0 1.6 - 2.6 mg/dL   CBC with Automated Differential    Collection Time: 11/12/23  4:38 AM   Result Value Ref Range    WBC 8.87 3.90 - 12.70 K/uL    RBC 2.43 (L) 4.60 - 6.20 M/uL    Hemoglobin 7.4 (L) 14.0 - 18.0 g/dL    Hematocrit 22.0 (L) 40.0 - 54.0 %    MCV 91 82 - 98 fL    MCH 30.5 27.0 - 31.0 pg    MCHC 33.6 32.0 - 36.0 g/dL    RDW 13.3 11.5 - 14.5 %    Platelets 128 (L) 150 -  450 K/uL    MPV 10.3 9.2 - 12.9 fL    Immature Granulocytes 0.8 (H) 0.0 - 0.5 %    Gran # (ANC) 6.2 1.8 - 7.7 K/uL    Immature Grans (Abs) 0.07 (H) 0.00 - 0.04 K/uL    Lymph # 1.7 1.0 - 4.8 K/uL    Mono # 0.8 0.3 - 1.0 K/uL    Eos # 0.0 0.0 - 0.5 K/uL    Baso # 0.05 0.00 - 0.20 K/uL    nRBC 0 0 /100 WBC    Gran % 69.9 38.0 - 73.0 %    Lymph % 19.6 18.0 - 48.0 %    Mono % 8.9 4.0 - 15.0 %    Eosinophil % 0.2 0.0 - 8.0 %    Basophil % 0.6 0.0 - 1.9 %    Differential Method Automated          Assessment/Diagnosis:    Gross hematuria  History of urinary retention    Plans:    - We discussed the etiology and management of the patient's gross hematuria. Explained that hematuria is multi-factorial and can be secondary to  cancer, obstructive uropathy, nephritis,  trauma,  infections, thrombosis, nephrolithiasis, bleeding disorders and medications.   - Clamp CBI at 6 AM on 11/13/23. If hematuria does not improve off irrigation, he will need to be NPO tomorrow night with the plan for cystoscopy with fulguration on 11/14/23.

## 2023-11-12 NOTE — ASSESSMENT & PLAN NOTE
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 Faecalis-urine culture no growth, so vanc discontinued.   Continue rocephin for now as well, and de-escalate as per C&S-no growth  Hold ASA/plavix  D/w Dr. Reza today via secure chat.

## 2023-11-12 NOTE — ASSESSMENT & PLAN NOTE
Chronic, controlled. Latest blood pressure and vitals reviewed-     Temp:  [97.7 °F (36.5 °C)-99.6 °F (37.6 °C)]   Pulse:  [65-99]   Resp:  [17-20]   BP: (129-155)/(62-70)   SpO2:  [96 %-99 %] .   Home meds for hypertension were reviewed and noted below.   Hypertension Medications             amLODIPine (NORVASC) 10 MG tablet Take 1 tablet (10 mg total) by mouth every evening.    carvedilol (COREG) 12.5 MG tablet 12.5 mg 2 (two) times daily.     MICARDIS 40 mg Tab Take 40 mg by mouth every morning.     NITROSTAT 0.4 mg SL tablet Place 0.4 mg under the tongue every 5 (five) minutes as needed for Chest pain.           While in the hospital, will manage blood pressure as follows; Adjust home antihypertensive regimen as follows- hold for hypotension    Will utilize p.r.n. blood pressure medication only if patient's blood pressure greater than 180/110 and he develops symptoms such as worsening chest pain or shortness of breath.

## 2023-11-12 NOTE — NURSING
New IV 20 g placed to left forearm by ED nurse via ultrasound due to patient being difficult stick.

## 2023-11-12 NOTE — ASSESSMENT & PLAN NOTE
Patient's anemia is currently uncontrolled. Has received 1 units of PRBCs on 11/9/23. Etiology likely d/t acute blood loss which was from gross hematuria  Current CBC reviewed-   Lab Results   Component Value Date    HGB 7.4 (L) 11/12/2023    HCT 22.0 (L) 11/12/2023     Monitor serial CBC and transfuse if patient becomes hemodynamically unstable, symptomatic or H/H drops below 7/21.  Transfuse one unit PRBCs today

## 2023-11-12 NOTE — ASSESSMENT & PLAN NOTE
Continue flomax  3way alexis with CBI-failed first attempt to clamp  Urology consulted-Plan to clamp CBI in am; if hematuria recurs will need cysto on Tuesday, 11/14, with Dr. Reza.

## 2023-11-12 NOTE — PROGRESS NOTES
[facility] Medicine  Progress Note    Patient Name: Ryan Stewart  MRN: [id number]  Patient Class: IP- Inpatient   Admission Date: 11/9/2023  Length of Stay: 2 days  Attending Physician: Simran Wilcox MD  Primary Care Provider: Erik Perry MD        Subjective:     Principal Problem:Gross hematuria        HPI:  Mr. Stewart is an 78 year old male with a history of HTN, CAD s/p RCA stent (2005), Gilbert's, appendiceal carcinoma, BPH s/p TURP by Dr Craig Reza on 6/2/21 who presents today with complaints of urinary retention. It is severe. It is associated with hematuria. He denies fever, chills, N/V/D, chest pain, or SOB. Symptoms began the prior day and he was seen by urology NP today, urine culture and cytology was collected, and prescribed flomax and macrobid.  He was unfortunately unable to get it filled d/t pharmacy back log. UA not available at this time. Prior urine cultures all with E. Faecalis.  He was unable to urinate prompting his presentation. In the ED, a alexis was placed and irrigated with aspiration of several clots with subsequent gross hematuria. Alexis was replaced with a 3 way alexis and CBI was started as recommended by urology. With aspiration of the large clots, he became diaphoretic, lightheaded, and hypotension that resolved with fluids and likely a vasovagal response. H&H dropped from 12.6/37 to 9.7/28.7 in 4 hours, and he was transfused 1 unit PRBCs. ED MD discussed case with urology, Dr. Flowers. Hospital medicine is consulted for admission.       Overview/Hospital Course:  Ryan Stewart was monitored closely during his hospitalization.  Urology was consulted and recommended placement of 3 way alexis catheter with continuous bladder irrigation.  He was transfused one unit PRBCs upon admission due to drop in hgb from 12.7 to 9.6.  His chronic plavix was held. CBI continued and urine cleared to a light pink.  CBI was clamped but unfortunately,  "hematuria recurred.  Hemoglobin dropped to 7.4 and he was transfused another unit of PRBCs on 11/12/23.  CBI was resumed.      Interval History: See "hospital course"      Review of Systems   Constitutional:  Negative for chills and fever.   HENT:  Negative for congestion, ear pain, sore throat and trouble swallowing.    Eyes:  Negative for pain, discharge and visual disturbance.   Respiratory:  Negative for cough and shortness of breath.    Cardiovascular:  Negative for chest pain and palpitations.   Gastrointestinal:  Negative for abdominal distention, abdominal pain, diarrhea, nausea and vomiting.   Genitourinary:  Positive for difficulty urinating and hematuria. Negative for urgency.   Musculoskeletal:  Negative for back pain, joint swelling, neck pain and neck stiffness.   Skin:  Negative for rash and wound.   Allergic/Immunologic: Negative for immunocompromised state.   Neurological:  Positive for light-headedness. Negative for dizziness, weakness, numbness and headaches.   Hematological:  Negative for adenopathy.   Psychiatric/Behavioral:  Negative for confusion and suicidal ideas. The patient is not nervous/anxious.    All other systems reviewed and are negative.    Objective:     Vital Signs (Most Recent):  Temp: 98.9 °F (37.2 °C) (11/12/23 1446)  Pulse: 78 (11/12/23 1446)  Resp: 18 (11/12/23 1446)  BP: (!) 143/65 (11/12/23 1446)  SpO2: 96 % (11/12/23 1446) Vital Signs (24h Range):  Temp:  [97.7 °F (36.5 °C)-99.6 °F (37.6 °C)] 98.9 °F (37.2 °C)  Pulse:  [65-99] 78  Resp:  [17-20] 18  SpO2:  [96 %-99 %] 96 %  BP: (129-155)/(62-70) 143/65     Weight: 103.4 kg (228 lb)  Body mass index is 36.8 kg/m².    Intake/Output Summary (Last 24 hours) at 11/12/2023 1520  Last data filed at 11/12/2023 1446  Gross per 24 hour   Intake 1606.31 ml   Output 3850 ml   Net -2243.69 ml         Physical Exam  Vitals and nursing note reviewed.   Constitutional:       Appearance: He is well-developed.   HENT:      Head: " Normocephalic and atraumatic.   Eyes:      Conjunctiva/sclera: Conjunctivae normal.      Pupils: Pupils are equal, round, and reactive to light.   Cardiovascular:      Rate and Rhythm: Regular rhythm. Tachycardia present.      Heart sounds: Normal heart sounds.   Pulmonary:      Effort: Pulmonary effort is normal. No respiratory distress.      Breath sounds: Normal breath sounds.   Abdominal:      General: Bowel sounds are normal. There is no distension.      Palpations: Abdomen is soft.   Genitourinary:     Comments: CBI in progress    Musculoskeletal:         General: Normal range of motion.      Cervical back: Normal range of motion and neck supple.   Skin:     General: Skin is warm and dry.      Capillary Refill: Capillary refill takes less than 2 seconds.   Neurological:      General: No focal deficit present.      Mental Status: He is alert and oriented to person, place, and time.   Psychiatric:         Mood and Affect: Mood normal.         Behavior: Behavior normal.         Thought Content: Thought content normal.         Judgment: Judgment normal.             Significant Labs: All pertinent labs within the past 24 hours have been reviewed.  CBC:   Recent Labs   Lab 11/10/23  1741 11/11/23 0049 11/12/23  0438   WBC  --  12.03 8.87   HGB 8.9* 8.2* 7.4*   HCT 26.6* 24.8* 22.0*   PLT  --  145* 128*     CMP:   Recent Labs   Lab 11/11/23 0049 11/12/23  0438    137   K 4.3 3.9    106   CO2 23 25   * 108   BUN 33* 16   CREATININE 1.0 0.8   CALCIUM 7.8* 7.8*   PROT 4.9* 5.2*   ALBUMIN 3.0* 3.0*   BILITOT 0.9 1.0   ALKPHOS 42* 39*   AST 13 17   ALT 15 16   ANIONGAP 8 6*       Significant Imaging: I have reviewed all pertinent imaging results/findings within the past 24 hours.      Assessment/Plan:      * Gross hematuria  Admit to med/tele  Consult urology -called per ED MD  CBI-now clamped  Trend H&H [id number] to daily CBC  Cardiac diet-no urgent plans for intervention  Will cover with  vancomycin given history of E. Faecalis-urine culture no growth, so vanc discontinued.   Continue rocephin for now as well, and de-escalate as per C&S-no growth  Hold ASA/plavix  D/w Dr. Reza today via secure chat.    Urinary retention  Continue flomax  3way alexis with CBI-failed first attempt to clamp  Urology consulted-Plan to clamp CBI in am; if hematuria recurs will need cysto on Tuesday, 11/14, with Dr. Reza.      Mixed hyperlipidemia  Continue statin      Hypertension  Chronic, controlled. Latest blood pressure and vitals reviewed-     Temp:  [97.7 °F (36.5 °C)-99.6 °F (37.6 °C)]   Pulse:  [65-99]   Resp:  [17-20]   BP: (129-155)/(62-70)   SpO2:  [96 %-99 %] .   Home meds for hypertension were reviewed and noted below.   Hypertension Medications             amLODIPine (NORVASC) 10 MG tablet Take 1 tablet (10 mg total) by mouth every evening.    carvedilol (COREG) 12.5 MG tablet 12.5 mg 2 (two) times daily.     MICARDIS 40 mg Tab Take 40 mg by mouth every morning.     NITROSTAT 0.4 mg SL tablet Place 0.4 mg under the tongue every 5 (five) minutes as needed for Chest pain.           While in the hospital, will manage blood pressure as follows; Adjust home antihypertensive regimen as follows- hold for hypotension    Will utilize p.r.n. blood pressure medication only if patient's blood pressure greater than 180/110 and he develops symptoms such as worsening chest pain or shortness of breath.    Acute blood loss anemia  Patient's anemia is currently uncontrolled. Has received 1 units of PRBCs on 11/9/23. Etiology likely d/t acute blood loss which was from gross hematuria  Current CBC reviewed-   Lab Results   Component Value Date    HGB 7.4 (L) 11/12/2023    HCT 22.0 (L) 11/12/2023     Monitor serial CBC and transfuse if patient becomes hemodynamically unstable, symptomatic or H/H drops below 7/21.  Transfuse one unit PRBCs today      VTE Risk Mitigation (From admission, onward)         Ordered     IP VTE HIGH  RISK PATIENT  Once         11/10/23 0058     Place sequential compression device  Until discontinued         11/10/23 0058                Discharge Planning   IBAN: 11/13/2023     Code Status: Full Code   Is the patient medically ready for discharge?:     Reason for patient still in hospital (select all that apply): Patient trending condition, Treatment and Consult recommendations  Discharge Plan A: Home                  SABINA Dawkins  Department of Hospital Medicine   [facility]/Surg

## 2023-11-12 NOTE — SUBJECTIVE & OBJECTIVE
"Interval History: See "hospital course"      Review of Systems   Constitutional:  Negative for chills and fever.   HENT:  Negative for congestion, ear pain, sore throat and trouble swallowing.    Eyes:  Negative for pain, discharge and visual disturbance.   Respiratory:  Negative for cough and shortness of breath.    Cardiovascular:  Negative for chest pain and palpitations.   Gastrointestinal:  Negative for abdominal distention, abdominal pain, diarrhea, nausea and vomiting.   Genitourinary:  Positive for difficulty urinating and hematuria. Negative for urgency.   Musculoskeletal:  Negative for back pain, joint swelling, neck pain and neck stiffness.   Skin:  Negative for rash and wound.   Allergic/Immunologic: Negative for immunocompromised state.   Neurological:  Positive for light-headedness. Negative for dizziness, weakness, numbness and headaches.   Hematological:  Negative for adenopathy.   Psychiatric/Behavioral:  Negative for confusion and suicidal ideas. The patient is not nervous/anxious.    All other systems reviewed and are negative.    Objective:     Vital Signs (Most Recent):  Temp: 98.9 °F (37.2 °C) (11/12/23 1446)  Pulse: 78 (11/12/23 1446)  Resp: 18 (11/12/23 1446)  BP: (!) 143/65 (11/12/23 1446)  SpO2: 96 % (11/12/23 1446) Vital Signs (24h Range):  Temp:  [97.7 °F (36.5 °C)-99.6 °F (37.6 °C)] 98.9 °F (37.2 °C)  Pulse:  [65-99] 78  Resp:  [17-20] 18  SpO2:  [96 %-99 %] 96 %  BP: (129-155)/(62-70) 143/65     Weight: 103.4 kg (228 lb)  Body mass index is 36.8 kg/m².    Intake/Output Summary (Last 24 hours) at 11/12/2023 1520  Last data filed at 11/12/2023 1446  Gross per 24 hour   Intake 1606.31 ml   Output 3850 ml   Net -2243.69 ml         Physical Exam  Vitals and nursing note reviewed.   Constitutional:       Appearance: He is well-developed.   HENT:      Head: Normocephalic and atraumatic.   Eyes:      Conjunctiva/sclera: Conjunctivae normal.      Pupils: Pupils are equal, round, and reactive to " light.   Cardiovascular:      Rate and Rhythm: Regular rhythm. Tachycardia present.      Heart sounds: Normal heart sounds.   Pulmonary:      Effort: Pulmonary effort is normal. No respiratory distress.      Breath sounds: Normal breath sounds.   Abdominal:      General: Bowel sounds are normal. There is no distension.      Palpations: Abdomen is soft.   Genitourinary:     Comments: CBI in progress    Musculoskeletal:         General: Normal range of motion.      Cervical back: Normal range of motion and neck supple.   Skin:     General: Skin is warm and dry.      Capillary Refill: Capillary refill takes less than 2 seconds.   Neurological:      General: No focal deficit present.      Mental Status: He is alert and oriented to person, place, and time.   Psychiatric:         Mood and Affect: Mood normal.         Behavior: Behavior normal.         Thought Content: Thought content normal.         Judgment: Judgment normal.             Significant Labs: All pertinent labs within the past 24 hours have been reviewed.  CBC:   Recent Labs   Lab 11/10/23  1741 11/11/23  0049 11/12/23  0438   WBC  --  12.03 8.87   HGB 8.9* 8.2* 7.4*   HCT 26.6* 24.8* 22.0*   PLT  --  145* 128*     CMP:   Recent Labs   Lab 11/11/23  0049 11/12/23  0438    137   K 4.3 3.9    106   CO2 23 25   * 108   BUN 33* 16   CREATININE 1.0 0.8   CALCIUM 7.8* 7.8*   PROT 4.9* 5.2*   ALBUMIN 3.0* 3.0*   BILITOT 0.9 1.0   ALKPHOS 42* 39*   AST 13 17   ALT 15 16   ANIONGAP 8 6*       Significant Imaging: I have reviewed all pertinent imaging results/findings within the past 24 hours.

## 2023-11-12 NOTE — NURSING
Moderate/dime sized piece of thick brown substance passed through alexis tubing. CBI slowed down due to drainage being clear. Pt ambulated around nursing station and taken to visit with spouse in 318. Tolerated ambulation well.

## 2023-11-12 NOTE — PLAN OF CARE
POC reviewed patient verbalizes understanding VSS afebrile CBI Clamped ambulated ion ponce way tolerated well denies pain this shift Telemetry monitored this shift, Urine turned dark red Clots noted open CBI to full flow Urine cleared to very light red Notified Dr Reza Urology,  And Audra Villa NP no clots noted at this time.

## 2023-11-13 ENCOUNTER — TELEPHONE (OUTPATIENT)
Dept: HEMATOLOGY/ONCOLOGY | Facility: CLINIC | Age: 78
End: 2023-11-13
Payer: MEDICARE

## 2023-11-13 ENCOUNTER — HOSPITAL ENCOUNTER (EMERGENCY)
Facility: HOSPITAL | Age: 78
Discharge: HOME | End: 2023-11-14
Attending: EMERGENCY MEDICINE
Payer: MEDICARE

## 2023-11-13 ENCOUNTER — HOSPITAL ENCOUNTER (EMERGENCY)
Facility: HOSPITAL | Age: 78
Discharge: HOME | End: 2023-11-13
Attending: EMERGENCY MEDICINE
Payer: MEDICARE

## 2023-11-13 VITALS
SYSTOLIC BLOOD PRESSURE: 121 MMHG | OXYGEN SATURATION: 97 % | WEIGHT: 228 LBS | HEART RATE: 95 BPM | DIASTOLIC BLOOD PRESSURE: 85 MMHG | RESPIRATION RATE: 16 BRPM | BODY MASS INDEX: 36.64 KG/M2 | TEMPERATURE: 98 F | HEIGHT: 66 IN

## 2023-11-13 VITALS
HEART RATE: 113 BPM | DIASTOLIC BLOOD PRESSURE: 74 MMHG | BODY MASS INDEX: 36.64 KG/M2 | RESPIRATION RATE: 20 BRPM | TEMPERATURE: 98 F | OXYGEN SATURATION: 98 % | HEIGHT: 66 IN | WEIGHT: 228 LBS | SYSTOLIC BLOOD PRESSURE: 143 MMHG

## 2023-11-13 DIAGNOSIS — Z76.89 ENCOUNTER FOR ASSESSMENT OF FOLEY CATHETER: Primary | ICD-10-CM

## 2023-11-13 DIAGNOSIS — R33.9 URINARY RETENTION: Primary | ICD-10-CM

## 2023-11-13 PROBLEM — D62 ACUTE BLOOD LOSS ANEMIA: Status: RESOLVED | Noted: 2021-05-07 | Resolved: 2023-11-13

## 2023-11-13 PROBLEM — R31.0 GROSS HEMATURIA: Chronic | Status: RESOLVED | Noted: 2021-05-07 | Resolved: 2023-11-13

## 2023-11-13 LAB
ALBUMIN SERPL BCP-MCNC: 2.8 G/DL (ref 3.5–5.2)
ALP SERPL-CCNC: 35 U/L (ref 55–135)
ALT SERPL W/O P-5'-P-CCNC: 19 U/L (ref 10–44)
ANION GAP SERPL CALC-SCNC: 6 MMOL/L (ref 8–16)
AST SERPL-CCNC: 19 U/L (ref 10–40)
BASOPHILS # BLD AUTO: 0.05 K/UL (ref 0–0.2)
BASOPHILS NFR BLD: 0.7 % (ref 0–1.9)
BILIRUB SERPL-MCNC: 1.1 MG/DL (ref 0.1–1)
BUN SERPL-MCNC: 16 MG/DL (ref 8–23)
CALCIUM SERPL-MCNC: 7.6 MG/DL (ref 8.7–10.5)
CHLORIDE SERPL-SCNC: 106 MMOL/L (ref 95–110)
CO2 SERPL-SCNC: 27 MMOL/L (ref 23–29)
CREAT SERPL-MCNC: 0.8 MG/DL (ref 0.5–1.4)
DIFFERENTIAL METHOD: ABNORMAL
EOSINOPHIL # BLD AUTO: 0.1 K/UL (ref 0–0.5)
EOSINOPHIL NFR BLD: 1.2 % (ref 0–8)
ERYTHROCYTE [DISTWIDTH] IN BLOOD BY AUTOMATED COUNT: 13.4 % (ref 11.5–14.5)
EST. GFR  (NO RACE VARIABLE): >60 ML/MIN/1.73 M^2
GLUCOSE SERPL-MCNC: 102 MG/DL (ref 70–110)
HCT VFR BLD AUTO: 24.1 % (ref 40–54)
HGB BLD-MCNC: 8.1 G/DL (ref 14–18)
IMM GRANULOCYTES # BLD AUTO: 0.03 K/UL (ref 0–0.04)
IMM GRANULOCYTES NFR BLD AUTO: 0.4 % (ref 0–0.5)
LYMPHOCYTES # BLD AUTO: 1.4 K/UL (ref 1–4.8)
LYMPHOCYTES NFR BLD: 20.5 % (ref 18–48)
MAGNESIUM SERPL-MCNC: 2 MG/DL (ref 1.6–2.6)
MCH RBC QN AUTO: 31.2 PG (ref 27–31)
MCHC RBC AUTO-ENTMCNC: 33.6 G/DL (ref 32–36)
MCV RBC AUTO: 93 FL (ref 82–98)
MONOCYTES # BLD AUTO: 0.7 K/UL (ref 0.3–1)
MONOCYTES NFR BLD: 9.4 % (ref 4–15)
NEUTROPHILS # BLD AUTO: 4.7 K/UL (ref 1.8–7.7)
NEUTROPHILS NFR BLD: 67.8 % (ref 38–73)
NRBC BLD-RTO: 0 /100 WBC
PLATELET # BLD AUTO: 135 K/UL (ref 150–450)
PMV BLD AUTO: 10.3 FL (ref 9.2–12.9)
POTASSIUM SERPL-SCNC: 3.6 MMOL/L (ref 3.5–5.1)
PROT SERPL-MCNC: 5 G/DL (ref 6–8.4)
RBC # BLD AUTO: 2.6 M/UL (ref 4.6–6.2)
SODIUM SERPL-SCNC: 139 MMOL/L (ref 136–145)
WBC # BLD AUTO: 6.93 K/UL (ref 3.9–12.7)

## 2023-11-13 PROCEDURE — 99282 EMERGENCY DEPT VISIT SF MDM: CPT

## 2023-11-13 PROCEDURE — 99283 EMERGENCY DEPT VISIT LOW MDM: CPT | Mod: 25

## 2023-11-13 PROCEDURE — 25000003 PHARM REV CODE 250: Performed by: NURSE PRACTITIONER

## 2023-11-13 PROCEDURE — 83735 ASSAY OF MAGNESIUM: CPT | Performed by: NURSE PRACTITIONER

## 2023-11-13 PROCEDURE — 51702 INSERT TEMP BLADDER CATH: CPT

## 2023-11-13 PROCEDURE — 94761 N-INVAS EAR/PLS OXIMETRY MLT: CPT

## 2023-11-13 PROCEDURE — 51798 US URINE CAPACITY MEASURE: CPT

## 2023-11-13 PROCEDURE — 36415 COLL VENOUS BLD VENIPUNCTURE: CPT | Performed by: NURSE PRACTITIONER

## 2023-11-13 PROCEDURE — 94760 N-INVAS EAR/PLS OXIMETRY 1: CPT

## 2023-11-13 PROCEDURE — 80053 COMPREHEN METABOLIC PANEL: CPT | Performed by: NURSE PRACTITIONER

## 2023-11-13 PROCEDURE — 25000003 PHARM REV CODE 250: Performed by: INTERNAL MEDICINE

## 2023-11-13 PROCEDURE — 85025 COMPLETE CBC W/AUTO DIFF WBC: CPT | Performed by: NURSE PRACTITIONER

## 2023-11-13 RX ORDER — CLOPIDOGREL BISULFATE 75 MG/1
75 TABLET ORAL EVERY MORNING
Start: 2023-11-18

## 2023-11-13 RX ADMIN — MUPIROCIN: 20 OINTMENT TOPICAL at 10:11

## 2023-11-13 RX ADMIN — FLUTICASONE PROPIONATE 100 MCG: 50 SPRAY, METERED NASAL at 10:11

## 2023-11-13 RX ADMIN — TAMSULOSIN HYDROCHLORIDE 0.4 MG: 0.4 CAPSULE ORAL at 10:11

## 2023-11-13 RX ADMIN — DOCUSATE SODIUM AND SENNOSIDES 1 TABLET: 8.6; 5 TABLET, FILM COATED ORAL at 10:11

## 2023-11-13 NOTE — DISCHARGE SUMMARY
[facility] Medicine  Discharge Summary      Patient Name: Ryan Stewart  MRN: [id number]  ALONDRA: [id number]  Patient Class: IP- Inpatient  Admission Date: 11/9/2023  Hospital Length of Stay: 3 days  Discharge Date and Time:  11/13/2023 12:36 PM  Attending Physician: Simran Wilcox MD   Discharging Provider: SABINA Canela  Primary Care Provider: Erik Perry MD    Primary Care Team: Networked reference to record PCT     HPI:   Mr. Stewart is an 78 year old male with a history of HTN, CAD s/p RCA stent (2005), Gilbert's, appendiceal carcinoma, BPH s/p TURP by Dr Craig Reza on 6/2/21 who presents today with complaints of urinary retention. It is severe. It is associated with hematuria. He denies fever, chills, N/V/D, chest pain, or SOB. Symptoms began the prior day and he was seen by urology NP today, urine culture and cytology was collected, and prescribed flomax and macrobid.  He was unfortunately unable to get it filled d/t pharmacy back log. UA not available at this time. Prior urine cultures all with E. Faecalis.  He was unable to urinate prompting his presentation. In the ED, a alexis was placed and irrigated with aspiration of several clots with subsequent gross hematuria. Alexis was replaced with a 3 way alexis and CBI was started as recommended by urology. With aspiration of the large clots, he became diaphoretic, lightheaded, and hypotension that resolved with fluids and likely a vasovagal response. H&H dropped from 12.6/37 to 9.7/28.7 in 4 hours, and he was transfused 1 unit PRBCs. ED MD discussed case with urology, Dr. Flowers. Hospital medicine is consulted for admission.     * No surgery found *      Hospital Course:   Ryan Stewart was monitored closely during his hospitalization.  Urology was consulted and recommended placement of 3 way alexis catheter with continuous bladder irrigation.  He was transfused a total of 2 units PRBCs during admission due  "to drop as low as 7.4. from 12.7 most recent baseline.  His chronic plavix was held. CBI continued, attempt clamp was unsuccessful. Monitoring continued and repeat clamp was successful and urine eventually cleared. Cleared by urology who ok'ed for discharge with close outpatient follow - alexis to remain in place until seen by urology in clinic. Hemoglobin monitored frequently which improved/stabilized at 8.1. He is anxious for discharge as he advises that his wife, who he cares for, is also in the hospital and he needs to "see about her". He is ambulating about the room without issue, conversational, afebrile without leukocytosis, lytes normalized, creatine WNL. Discharge to home with FU appointment in clinic as well referral to outpatient cardiology to discuss Plavix continuation. Vitals stable - discharge to home in stable condition.     Goals of Care Treatment Preferences:  Code Status: Full Code      Consults:   Consults (From admission, onward)          Status Ordering Provider     Inpatient consult to Urology  Once        Provider:  Coby Flowers MD    Completed KOLBY SHANNON            No new Assessment & Plan notes have been filed under this hospital service since the last note was generated.  Service: Hospital Medicine    Final Active Diagnoses:    Diagnosis Date Noted POA    Urinary retention [R33.9] 06/02/2021 Yes    Hypertension [I10] 01/01/1975 Yes     Chronic    Mixed hyperlipidemia [E78.2] 1945 Yes     Chronic      Problems Resolved During this Admission:    Diagnosis Date Noted Date Resolved POA    PRINCIPAL PROBLEM:  Gross hematuria [R31.0] 05/07/2021 11/13/2023 Yes     Chronic    Acute blood loss anemia [D62] 05/07/2021 11/13/2023 Yes       Discharged Condition: stable    Disposition: Home or Self Care    Follow Up:   Follow-up Information       Erik Perry MD Follow up.    Specialty: Internal Medicine  Why: unable to schedule this apt for you. please call to schedule apt after " DC  Contact information:  [address]  [address]  [facility] [address]  [phone]               Craig Reza Jr., MD Follow up in 3 day(s).    Specialty: Urology  Contact information:  [address] [place] [address]   [address]  [facility] [address]  [phone]               [facility] Follow up.    Specialty: Cardiology  Why: discuss ASA and plavix combo - states its been over 2 years since his stents  Contact information:  [facility] [address]                         Patient Instructions:      Diet Cardiac     Diet Cardiac     Notify your health care provider if you experience any of the following:  temperature >100.4     Notify your health care provider if you experience any of the following:  persistent nausea and vomiting or diarrhea     Notify your health care provider if you experience any of the following:  severe uncontrolled pain     Notify your health care provider if you experience any of the following:  difficulty breathing or increased cough     Notify your health care provider if you experience any of the following:  severe persistent headache     Notify your health care provider if you experience any of the following:  persistent dizziness, light-headedness, or visual disturbances     Notify your health care provider if you experience any of the following:  increased confusion or weakness     Notify your health care provider if you experience any of the following:  temperature >100.4     Notify your health care provider if you experience any of the following:  severe uncontrolled pain     Notify your health care provider if you experience any of the following:  persistent dizziness, light-headedness, or visual disturbances     Notify your health care provider if you experience any of the following:  increased confusion or weakness     Activity as tolerated       Significant Diagnostic Studies: Labs: CMP   Recent Labs   Lab 11/12/23  0438 11/13/23  0546    139   K  3.9 3.6    106   CO2 25 27    102   BUN 16 16   CREATININE 0.8 0.8   CALCIUM 7.8* 7.6*   PROT 5.2* 5.0*   ALBUMIN 3.0* 2.8*   BILITOT 1.0 1.1*   ALKPHOS 39* 35*   AST 17 19   ALT 16 19   ANIONGAP 6* 6*    and CBC   Recent Labs   Lab 11/12/23  0438 11/13/23  0546   WBC 8.87 6.93   HGB 7.4* 8.1*   HCT 22.0* 24.1*   * 135*       Pending Diagnostic Studies:       None           Medications:  Reconciled Home Medications:      Medication List        CHANGE how you take these medications      clopidogreL 75 mg tablet  Commonly known as: PLAVIX  Take 1 tablet (75 mg total) by mouth every morning. Start on Sunday 6/28  Start taking on: November 18, 2023  What changed: These instructions start on November 18, 2023. If you are unsure what to do until then, ask your doctor or other care provider.            CONTINUE taking these medications      albuterol 90 mcg/actuation inhaler  Commonly known as: PROVENTIL/VENTOLIN HFA  Inhale 2 puffs into the lungs every 6 (six) hours as needed for Wheezing or Shortness of Breath. Rescue     amLODIPine 10 MG tablet  Commonly known as: NORVASC  Take 1 tablet (10 mg total) by mouth every evening.     aspirin 81 MG EC tablet  Commonly known as: ECOTRIN  Take 1 tablet (81 mg total) by mouth every morning. Start on Sunday 6/28     capecitabine 500 MG Tab  Commonly known as: XELODA  Takes 3 tablets by mouth 2 times daily for 2 weeks on 1 week off..     carvediloL 12.5 MG tablet  Commonly known as: COREG  12.5 mg 2 (two) times daily.     CHOLECALCIFEROL (VITAMIN D3) ORAL  Take 2,000 Units by mouth every morning.     fish oil-omega-3 fatty acids 300-1,000 mg capsule  Take 1 capsule by mouth 2 (two) times daily.     fluticasone propionate 50 mcg/actuation nasal spray  Commonly known as: FLONASE  2 sprays by Each Nostril route daily as needed for Rhinitis.     HYDROcodone-acetaminophen 5-325 mg per tablet  Commonly known as: NORCO  Take 1 tablet by mouth every 6 (six) hours as  needed for Pain.     lovastatin 40 MG tablet  Commonly known as: MEVACOR  Take 40 mg by mouth nightly.     MICARDIS 40 MG Tab  Generic drug: telmisartan  Take 40 mg by mouth every morning.     nitrofurantoin (macrocrystal-monohydrate) 100 MG capsule  Commonly known as: MACROBID  Take 1 capsule (100 mg total) by mouth 2 (two) times daily. for 14 days     NITROSTAT 0.4 MG SL tablet  Generic drug: nitroGLYCERIN  Place 0.4 mg under the tongue every 5 (five) minutes as needed for Chest pain.     tamsulosin 0.4 mg Cap  Commonly known as: FLOMAX  Take 1 capsule (0.4 mg total) by mouth once daily. Take at bedtime              Indwelling Lines/Drains at time of discharge:   Lines/Drains/Airways       Drain  Duration                  Urethral Catheter 11/10/23 1045 Triple-lumen 24 Fr. 3 days                    Time spent on the discharge of patient: 35 minutes         SABINA Canela  Department of Hospital Medicine  [facility]/Surg

## 2023-11-13 NOTE — PLAN OF CARE
POc reviewed patient verbalizes understanding VSS afebrile one unit PRBC infused tolerated well no signs of reaction noted CBI continuous Urine pink and clear no clots noted ambulated [address] in [address] today no acute distress noted at this time denies pain

## 2023-11-13 NOTE — PLAN OF CARE
Problem: Adult Inpatient Plan of Care  Goal: Plan of Care Review  Outcome: Ongoing, Progressing  Goal: Patient-Specific Goal (Individualized)  Outcome: Ongoing, Progressing  Goal: Absence of Hospital-Acquired Illness or Injury  Outcome: Ongoing, Progressing  Goal: Optimal Comfort and Wellbeing  Outcome: Ongoing, Progressing  Goal: Readiness for Transition of Care  Outcome: Ongoing, Progressing     Problem: Infection  Goal: Absence of Infection Signs and Symptoms  Outcome: Ongoing, Progressing     Problem: Impaired Wound Healing  Goal: Optimal Wound Healing  Outcome: Ongoing, Progressing     Problem: Fluid Volume Deficit  Goal: Fluid Balance  Outcome: Ongoing, Progressing     POC discussed with patient. Meds given as ordered. Q2H purposeful rounding. Turning, coughing, deep breathing, and weight shifting encouraged. Safety measures maintained with side rails up, bed alarm on, slip resistant socks on, call light in reach, pt instructed to call for needs.

## 2023-11-13 NOTE — PLAN OF CARE
Patient cleared for discharge from case management standpoint.    Patient to contact PCP to schedule hospital follow up.  Urology and Cardiology office to contact patient to schedule hospital follow up       11/13/23 3068   Final Note   Assessment Type Final Discharge Note   Anticipated Discharge Disposition Home   What phone number can be called within the next 1-3 days to see how you are doing after discharge? [phone]   Hospital Resources/Appts/Education Provided Provided patient/caregiver with written discharge plan information;Provided education on problems/symptoms using teachback;Appointments scheduled and added to AVS   Post-Acute Status   Post-Acute Authorization Other   Other Status No Post-Acute Service Needs   Discharge Delays None known at this time

## 2023-11-13 NOTE — TELEPHONE ENCOUNTER
Pt states he was discharged yesterday and had a pint of blood before discharge and another pint 3 days prior would like to know when a good time would be to reschedule appt @ 9:20am 11/17/23 which he can not make because of helping spouse but would like to know if appt shoud be put off further due to receipt of blood units, Please advise?

## 2023-11-13 NOTE — PLAN OF CARE
POc reviewed VSS afberile CBI clamped urine remained light red And clear no clots noted ambulated in ponce way tolerated well Leg bag offered but Patient said he would take it home and connect to alexis catheter he has had one before Follow up appointments arranged denies spasms dc to home this shift  INstructed patient to stay off plavix until seen by Dr Reza

## 2023-11-14 VITALS
HEIGHT: 66 IN | RESPIRATION RATE: 18 BRPM | BODY MASS INDEX: 36.64 KG/M2 | OXYGEN SATURATION: 94 % | WEIGHT: 228 LBS | TEMPERATURE: 97 F | SYSTOLIC BLOOD PRESSURE: 124 MMHG | DIASTOLIC BLOOD PRESSURE: 76 MMHG | HEART RATE: 110 BPM

## 2023-11-14 RX ORDER — TAMSULOSIN HYDROCHLORIDE 0.4 MG/1
0.4 CAPSULE ORAL
Status: DISCONTINUED | OUTPATIENT
Start: 2023-11-14 | End: 2023-11-14 | Stop reason: HOSPADM

## 2023-11-14 RX ORDER — TAMSULOSIN HYDROCHLORIDE 0.4 MG/1
0.4 CAPSULE ORAL DAILY
Qty: 10 CAPSULE | Refills: 0 | Status: SHIPPED | OUTPATIENT
Start: 2023-11-14 | End: 2024-11-13

## 2023-11-14 NOTE — ED PROVIDER NOTES
"Encounter Date: 11/13/2023       History     Chief Complaint   Patient presents with    alexis catheter problem     Ryan Stewart is a 78 y.o. male presenting for evaluation of his alexis catheter.  He states the catheter was inserted last Thursday when he arrived here in the ED with hematuria.  He states he was subsequently admitted to the hospital and the hematuria seemed to improve.  He was feeling better and decided to leave the hospital earlier today.  When he got home, he states the catheter "worked" one time, but since then, he has noticed no drainage into the bag and he "peed" around the catheter upon arrival here in the ED.  No fever, no chills.  No lower abdominal pain or pressure.   He has a past medical history of BPH with obstruction/lower urinary tract symptoms, Cancer, Carcinoma of appendix, Coronary artery disease, Elevated PSA, Gilbert's disease (09/28/2021), Hyperlipidemia, Hypertension, MI (myocardial infarction) (2005), Primary appendiceal adenocarcinoma, and Stented coronary artery (2005).      The history is provided by the patient.     Review of patient's allergies indicates:   Allergen Reactions    Ciprofloxacin      vomiting    Rapaflo [silodosin]      Urinating increased, kidney pain    Simvastatin Other (See Comments)     Severe muscle pain     Past Medical History:   Diagnosis Date    BPH with obstruction/lower urinary tract symptoms     Cancer     prostate    Carcinoma of appendix     Coronary artery disease     Elevated PSA     Gilbert's disease 09/28/2021    Hyperlipidemia     Hypertension     MI (myocardial infarction) 2005    Primary appendiceal adenocarcinoma     Stented coronary artery 2005    RCA     Past Surgical History:   Procedure Laterality Date    APPENDECTOMY      CARDIAC SURGERY      stent coronary    COLON SURGERY      CYSTOSCOPY      CYSTOSCOPY N/A 6/2/2021    Procedure: CYSTOSCOPY;  Surgeon: Craig Reza Jr., MD;  Location: [facility];  Service: Urology;  Laterality: " Timothy Monk 75 y.o. male placed in observation for hemodyalsis. In the ED, patient with a potassium of 5.4 and an H&H of 9.1/28.1 consistent with baseline. Nephrology consulted and HD orders for tomorrow. SW consulted for outpatient HD follow up. Potassium of 5.4 next morning, dialysis orders placed and patient went to dialysis. Patient seen by PT recommended HH with rolling walker and shower chair.  The patient had his first dialysis on 8/15 with a repeat on 8/16. The patient was found to have elevated LFT's and signs of obstruction. Zosyn was started. Ultrasound RUQ ordered. GI consulted. Possible passed gall stone as patient's LFT's trended down.   was consulted to arrange out patient dialysis. The patient had his third round of dialysis on 8/20. He will continue on a M,W,F schedule. H/H will be arranged. Diet low Na, ADA 1800 carisa. Follow up with PCP and GI in one week.    N/A;    EAR EXAMINATION UNDER ANESTHESIA      LAPAROSCOPIC APPENDECTOMY N/A 2021    Procedure: APPENDECTOMY, LAPAROSCOPIC;  Surgeon: Sundar Garcia MD;  Location: [facility] OR;  Service: General;  Laterality: N/A;    LAPAROSCOPIC CHOLECYSTECTOMY N/A 10/10/2021    Procedure: CHOLECYSTECTOMY, LAPAROSCOPIC;  Surgeon: Eulogio Yusuf MD;  Location: [facility] OR;  Service: General;  Laterality: N/A;    SURGICAL REMOVAL OF ILEUM WITH CECUM  2021    Procedure: EXCISION, CECUM WITH ILEUM;  Surgeon: Sundar Garcia MD;  Location: [facility] OR;  Service: General;;    TRANSRECTAL ULTRASOUND EXAMINATION N/A 2021    Procedure: ULTRASOUND, RECTAL APPROACH;  Surgeon: Craig Reza Jr., MD;  Location: [facility] OR;  Service: Urology;  Laterality: N/A;    TRANSURETHRAL RESECTION OF PROSTATE N/A 2021    Procedure: TURP (TRANSURETHRAL RESECTION OF PROSTATE);  Surgeon: Craig Reza Jr., MD;  Location: [facility] OR;  Service: Urology;  Laterality: N/A;     No family history on file.  Social History     Tobacco Use    Smoking status: Former     Current packs/day: 0.00     Types: Cigarettes     Quit date: 1991     Years since quittin.4    Smokeless tobacco: Never    Tobacco comments:     cigars and pipe   Substance Use Topics    Alcohol use: Not Currently    Drug use: Not Currently     Review of Systems   Constitutional:  Negative for chills and fever.   Respiratory:  Negative for cough, chest tightness, shortness of breath and wheezing.    Cardiovascular:  Negative for chest pain and palpitations.   Gastrointestinal:  Negative for abdominal pain, diarrhea, nausea and vomiting.   Genitourinary:  Negative for dysuria and hematuria.        Zazueta Catheter problem.    Musculoskeletal:  Negative for arthralgias, back pain, joint swelling, myalgias, neck pain and neck stiffness.   Skin:  Negative for color change, pallor, rash and wound.   Neurological:  Negative for weakness and numbness.   Hematological:  Does not bruise/bleed  easily.       Physical Exam     Initial Vitals [11/13/23 1642]   BP Pulse Resp Temp SpO2   (!) 143/74 (!) 113 20 98.1 °F (36.7 °C) 98 %      MAP       --         Physical Exam    Nursing note and vitals reviewed.  Constitutional: He appears well-developed and well-nourished. He is not diaphoretic. No distress.   HENT:   Head: Normocephalic and atraumatic.   Mouth/Throat: Oropharynx is clear and moist.   Cardiovascular:  Normal rate, regular rhythm, normal heart sounds and intact distal pulses.     Exam reveals no gallop and no friction rub.       No murmur heard.  Pulmonary/Chest: Breath sounds normal. No respiratory distress. He has no wheezes. He has no rhonchi. He has no rales. He exhibits no tenderness.   Abdominal: Abdomen is soft. He exhibits no distension and no mass. There is no abdominal tenderness.   Genitourinary:    Genitourinary Comments: Intact alexis catheter with urination around the catheter with bearing down.  Small amount of pink urine noted to catheter bag.  No clots or gross hematuria.     Musculoskeletal:         General: No tenderness or edema. Normal range of motion.     Neurological: He is alert and oriented to person, place, and time. He has normal strength. No sensory deficit.   Skin: Skin is warm and dry. No rash and no abscess noted. No erythema.   Psychiatric: He has a normal mood and affect.         ED Course   Procedures  Labs Reviewed - No data to display       Imaging Results    None          Medications - No data to display  Medical Decision Making  Bladder scan is negative for evidence of urinary retention secondary to malfunctioning alexis catheter.  Catheter is irrigated and seems to be working in the ED.  Will discharge patient home to follow-up with Urology in 2-3 days as recommended by hospital medicine upon his discharge from hospital earlier today.  Patient voices understanding and is agreeable to the plan.  Specific return precautions are given.                                   Clinical Impression:   Final diagnoses:  [Z76.89] Encounter for assessment of Zazueta catheter (Primary)        ED Disposition Condition    Discharge Stable          ED Prescriptions    None       Follow-up Information       Follow up With Specialties Details Why Contact Info    Craig Reza Jr., MD Urology  call office for follow-up appointment [address] [place] [address]   [address]  [facility] [address]  [phone]               Yee Dumont, PA-C  11/13/23 6494

## 2023-11-14 NOTE — ED PROVIDER NOTES
Encounter Date: 11/13/2023       History     Chief Complaint   Patient presents with    Urinary Retention     Pt seen earlier for retention - alexis placed, states not draining.      This patient was seen earlier today and diagnosed with urinary retention had Alexis catheter placed.  Patient states at home he gets a sensation that he has to urinate and he presses down and he leaks around the catheter.  Patient states he is has not had any urine flow into the bag since he is left here.  Patient does state that he once again does bear down and urine comes out from around the Alexis catheter.    The history is provided by the patient.     Review of patient's allergies indicates:   Allergen Reactions    Ciprofloxacin      vomiting    Rapaflo [silodosin]      Urinating increased, kidney pain    Simvastatin Other (See Comments)     Severe muscle pain     Past Medical History:   Diagnosis Date    BPH with obstruction/lower urinary tract symptoms     Cancer     prostate    Carcinoma of appendix     Coronary artery disease     Elevated PSA     Gilbert's disease 09/28/2021    Hyperlipidemia     Hypertension     MI (myocardial infarction) 2005    Primary appendiceal adenocarcinoma     Stented coronary artery 2005    RCA     Past Surgical History:   Procedure Laterality Date    APPENDECTOMY      CARDIAC SURGERY      stent coronary    COLON SURGERY      CYSTOSCOPY      CYSTOSCOPY N/A 6/2/2021    Procedure: CYSTOSCOPY;  Surgeon: Craig Reza Jr., MD;  Location: [facility] OR;  Service: Urology;  Laterality: N/A;    EAR EXAMINATION UNDER ANESTHESIA      LAPAROSCOPIC APPENDECTOMY N/A 5/6/2021    Procedure: APPENDECTOMY, LAPAROSCOPIC;  Surgeon: Sundar Garcia MD;  Location: [facility] OR;  Service: General;  Laterality: N/A;    LAPAROSCOPIC CHOLECYSTECTOMY N/A 10/10/2021    Procedure: CHOLECYSTECTOMY, LAPAROSCOPIC;  Surgeon: Eulogio Yusuf MD;  Location: [facility] OR;  Service: General;  Laterality: N/A;    SURGICAL REMOVAL OF ILEUM WITH CECUM   2021    Procedure: EXCISION, CECUM WITH ILEUM;  Surgeon: Sundar Garcia MD;  Location: [facility] OR;  Service: General;;    TRANSRECTAL ULTRASOUND EXAMINATION N/A 2021    Procedure: ULTRASOUND, RECTAL APPROACH;  Surgeon: Craig Reza Jr., MD;  Location: [facility] OR;  Service: Urology;  Laterality: N/A;    TRANSURETHRAL RESECTION OF PROSTATE N/A 2021    Procedure: TURP (TRANSURETHRAL RESECTION OF PROSTATE);  Surgeon: Craig Reza Jr., MD;  Location: [facility] OR;  Service: Urology;  Laterality: N/A;     No family history on file.  Social History     Tobacco Use    Smoking status: Former     Current packs/day: 0.00     Types: Cigarettes     Quit date: 1991     Years since quittin.4    Smokeless tobacco: Never    Tobacco comments:     cigars and pipe   Substance Use Topics    Alcohol use: Not Currently    Drug use: Not Currently     Review of Systems   Constitutional: Negative.    HENT: Negative.     Eyes: Negative.    Respiratory: Negative.     Cardiovascular: Negative.    Gastrointestinal: Negative.    Endocrine: Negative.    Genitourinary:  Positive for decreased urine volume and difficulty urinating.   Musculoskeletal: Negative.    Skin: Negative.    Allergic/Immunologic: Negative.    Neurological: Negative.    Hematological: Negative.    Psychiatric/Behavioral: Negative.     All other systems reviewed and are negative.      Physical Exam     Initial Vitals [23 2303]   BP Pulse Resp Temp SpO2   124/76 (!) 126 18 97.4 °F (36.3 °C) 96 %      MAP       --         Physical Exam    Nursing note and vitals reviewed.  Constitutional: Vital signs are normal. He appears well-developed. He is active and cooperative.   HENT:   Head: Normocephalic and atraumatic.   Eyes: Conjunctivae, EOM and lids are normal. Pupils are equal, round, and reactive to light.   Neck: Trachea normal. Neck supple. No thyroid mass present.    Full passive range of motion without pain.     Cardiovascular:  Normal rate, regular  rhythm, S1 normal, S2 normal, normal heart sounds, intact distal pulses and normal pulses.           Abdominal: Abdomen is soft and protuberant. Bowel sounds are normal. There is no abdominal tenderness.   Musculoskeletal:         General: Normal range of motion.      Cervical back: Full passive range of motion without pain and neck supple.     Lymphadenopathy:     He has no axillary adenopathy.   Neurological: He is alert and oriented to person, place, and time.   Skin: Skin is warm, dry and intact.   Psychiatric: He has a normal mood and affect. His speech is normal and behavior is normal. Judgment and thought content normal. Cognition and memory are normal.         ED Course   Procedures  Labs Reviewed - No data to display       Imaging Results    None          Medications - No data to display  Medical Decision Making  This patient presents with urinary retention.  Patient had some mechanical difficulty with the Zazueta.  Patient's Zazueta was subsequently replaced blunt up to the proper tube size.  There was still some leakage appreciated.  But the Zazueta was functional.  No signs of any urinary retention with a Zazueta in place.  Patient instructed to follow up with Urology and given Flomax here in the emergency department.    Amount and/or Complexity of Data Reviewed  Labs:  Decision-making details documented in ED Course.    Risk  Prescription drug management.                               Clinical Impression:   Final diagnoses:  [R33.9] Urinary retention (Primary)        ED Disposition Condition    Discharge Stable          ED Prescriptions       Medication Sig Dispense Start Date End Date Auth. Provider    tamsulosin (FLOMAX) 0.4 mg Cap Take 1 capsule (0.4 mg total) by mouth once daily. 10 capsule 11/14/2023 11/13/2024 Darrel Rob MD          Follow-up Information       Follow up With Specialties Details Why Contact Info    Erik Perry MD Internal Medicine In 2 days  [address] [facility]  [address]  [facility] [address]  [phone]               Darrel Rob MD  11/14/23 0605

## 2023-11-14 NOTE — TELEPHONE ENCOUNTER
Spoke to pt and notified appts does not need to be pushed back due to receiving 2 units of blood, pt would like to move appts to December, lab rescheduled 12/15/23 and appt w/Dr Cartagena 12/18/23.

## 2023-11-14 NOTE — ED NOTES
Bladder scan 8ml    Irrigated patients bladder   20ml and it flowed through to the cath bag no clots after release  30ml and the patient reported feeling a strong urge to urinate and then began urinating around the catheter and some went into the bag     Spoke with Yee about the outcoming awaiting further direction

## 2023-11-16 ENCOUNTER — PATIENT OUTREACH (OUTPATIENT)
Dept: ADMINISTRATIVE | Facility: CLINIC | Age: 78
End: 2023-11-16
Payer: MEDICARE

## 2023-11-16 NOTE — PROGRESS NOTES
C3 nurse spoke with Ryan Stewart for a TCC post hospital discharge follow up call. The patient has a scheduled HOSFU appointment with Erik Perry MD on unknown @ unknown.    (See notes)

## 2023-11-20 ENCOUNTER — CLINICAL SUPPORT (OUTPATIENT)
Dept: UROLOGY | Facility: CLINIC | Age: 78
End: 2023-11-20
Payer: MEDICARE

## 2023-11-20 DIAGNOSIS — R33.9 URINARY RETENTION: Primary | ICD-10-CM

## 2023-11-20 PROCEDURE — 99499 UNLISTED E&M SERVICE: CPT | Mod: S$PBB,,, | Performed by: UROLOGY

## 2023-11-20 PROCEDURE — 99499 NO LOS: ICD-10-PCS | Mod: S$PBB,,, | Performed by: UROLOGY

## 2023-11-20 NOTE — PROGRESS NOTES
Patient here today to have his catheter removed.   ?  30 ml saline removed from catheter balloon.    24 FR Catheter removed .   Catheter bag contains 100 MLs of clear yellow urine   Informed patient to hydrate and return to clinic at 2:30 for bladder scan.   Patient left the office in satisfactory condition.

## 2023-11-24 NOTE — PROGRESS NOTES
Patient arrived to clinic to do PVR, went to use the bathroom first, bladder scan done, resulted 50 ml.Strict instructions to call us (if during clinic hours) or go to ED (if afterhours) if can't void.

## 2023-11-27 ENCOUNTER — TELEPHONE (OUTPATIENT)
Dept: HEMATOLOGY/ONCOLOGY | Facility: CLINIC | Age: 78
End: 2023-11-27
Payer: MEDICARE

## 2023-11-27 NOTE — TELEPHONE ENCOUNTER
Tried to reach out to pt.  Vm and portal not set up.  I will keep trying to contact pt in ref to r/s appt

## 2023-12-06 ENCOUNTER — OFFICE VISIT (OUTPATIENT)
Dept: UROLOGY | Facility: CLINIC | Age: 78
End: 2023-12-06
Payer: MEDICARE

## 2023-12-06 VITALS — WEIGHT: 227.94 LBS | BODY MASS INDEX: 36.63 KG/M2 | HEIGHT: 66 IN

## 2023-12-06 DIAGNOSIS — N40.0 BPH (BENIGN PROSTATIC HYPERPLASIA): ICD-10-CM

## 2023-12-06 DIAGNOSIS — R33.9 URINARY RETENTION: Primary | ICD-10-CM

## 2023-12-06 DIAGNOSIS — R31.0 GROSS HEMATURIA: ICD-10-CM

## 2023-12-06 LAB
BILIRUBIN, UA POC OHS: NEGATIVE
BLOOD, UA POC OHS: ABNORMAL
CLARITY, UA POC OHS: CLEAR
COLOR, UA POC OHS: YELLOW
GLUCOSE, UA POC OHS: NEGATIVE
KETONES, UA POC OHS: NEGATIVE
LEUKOCYTES, UA POC OHS: ABNORMAL
NITRITE, UA POC OHS: NEGATIVE
PH, UA POC OHS: 6.5
POC RESIDUAL URINE VOLUME: 32 ML (ref 0–100)
PROTEIN, UA POC OHS: 100
SPECIFIC GRAVITY, UA POC OHS: 1.01
UROBILINOGEN, UA POC OHS: 0.2

## 2023-12-06 PROCEDURE — 87086 URINE CULTURE/COLONY COUNT: CPT | Performed by: UROLOGY

## 2023-12-06 PROCEDURE — 87088 URINE BACTERIA CULTURE: CPT | Performed by: UROLOGY

## 2023-12-06 PROCEDURE — 81003 URINALYSIS AUTO W/O SCOPE: CPT | Mod: PBBFAC,PO | Performed by: UROLOGY

## 2023-12-06 PROCEDURE — 87186 SC STD MICRODIL/AGAR DIL: CPT | Performed by: UROLOGY

## 2023-12-06 PROCEDURE — 99214 PR OFFICE/OUTPT VISIT, EST, LEVL IV, 30-39 MIN: ICD-10-PCS | Mod: S$PBB,,, | Performed by: UROLOGY

## 2023-12-06 PROCEDURE — 99213 OFFICE O/P EST LOW 20 MIN: CPT | Mod: PBBFAC,PO | Performed by: UROLOGY

## 2023-12-06 PROCEDURE — 99999PBSHW POCT BLADDER SCAN: ICD-10-PCS | Mod: PBBFAC,,,

## 2023-12-06 PROCEDURE — 99999 PR PBB SHADOW E&M-EST. PATIENT-LVL III: CPT | Mod: PBBFAC,,, | Performed by: UROLOGY

## 2023-12-06 PROCEDURE — 99999 PR PBB SHADOW E&M-EST. PATIENT-LVL III: ICD-10-PCS | Mod: PBBFAC,,, | Performed by: UROLOGY

## 2023-12-06 PROCEDURE — 51798 US URINE CAPACITY MEASURE: CPT | Mod: PBBFAC,PO | Performed by: UROLOGY

## 2023-12-06 PROCEDURE — 99999PBSHW POCT BLADDER SCAN: Mod: PBBFAC,,,

## 2023-12-06 PROCEDURE — 87077 CULTURE AEROBIC IDENTIFY: CPT | Performed by: UROLOGY

## 2023-12-06 PROCEDURE — 99999PBSHW POCT URINALYSIS(INSTRUMENT): Mod: PBBFAC,,,

## 2023-12-06 PROCEDURE — 99214 OFFICE O/P EST MOD 30 MIN: CPT | Mod: S$PBB,,, | Performed by: UROLOGY

## 2023-12-06 RX ORDER — FINASTERIDE 5 MG/1
5 TABLET, FILM COATED ORAL DAILY
Qty: 90 TABLET | Refills: 3 | Status: SHIPPED | OUTPATIENT
Start: 2023-12-06 | End: 2024-12-05

## 2023-12-06 NOTE — PROGRESS NOTES
[facility] Established Patient/H&P:    Ryan Stewart is a 78 y.o. male who presents for follow up for urinary retention and gross hematuria.     Patient with a history of elevated PSA, gross hematuria and enlarged prostate previously managed with Dr. Quintanilla. He presented to [facility] ED with urinary retention after appendectomy on 5/6/21 for ruptured appendicitis. 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     Alexis catheter placed with 1.5L urine. CT renal stone on 5/17/21 revealed a significantly enlarged prostate.  He underwent voiding trial in clinic with NP Eliana Baez, but had the alexis replaced. He has since developed gross hematuria. He states that prior to his appendectomy he was voiding well. He has been on Flomax for several years. Now on Finasteride.     On review of records, he has a history of elevated PSA s/p biopsy in [place] in 2002. Declined any further biopsies. He is on Plavix for history of MI.        Interval History     7/22/21: Patient with a 170 cc prostate s/p TURP on 6/22/21. Pathology negative. Discharged home POD 2. Underwent successful voiding trial 1 week post-op. States his lower urinary tract symptoms have improved significantly. Remains on Flomax and Finasteride.      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      1/21/22: Patient is doing well. Continues to void fine with mild lower urinary tract symptoms. IPSS 4. QoL 0. PVR 26 mL. Discontinued Flomax and Finasteride.     2/22/23: Here for follow up. Doing well with no complaints. IPSS remains at 4. QoL 0. PVR 71 mL. He underwent CT abdomen pelvis with contrast which showed an enlarged prostate, 13 mm lesion on the left kidney - not changed in size and fatty liver.     11/12/23: Patient presented to the emergency department on 11/9/23 with gross hematuria. 18 [place] alexis placed and started on CBI. H/H dropped. Evaluated by Dr. Flowers who recommended up-sizing catheter and  restarting CBI. Attempted to clamp irrigation yesterday, but hematuria recurred. Urine culture negative.    12/6/23: Alexis has been removed after discharge. Here today for follow up. States he has had some weakening of his urinary stream since alexis removal. IPSS up to 13. PVR 32 mL today. Has not restarted his Plavix.  Urine dipstick with trace blood, protein and small leukocytes.      Denies any fever, chills, flank pain, nephrolithiasis,  trauma or history of  malignancy.         PSA  5.3                   11/3/20  4.7                   8/19/19  5.3                   9/4/18  6.0                   7/8/16  8.9                   9/4/13     Urine culture  No growth        11/9/23  Enterococcus  6/2/21  Enterococcus  5/6/21     IPSS    QoL  13 5 12/6/23  4          0          2/22/23  6          1          7/22/21     PVR  32 mL  12/6/23  50 mL  11/20/23  71 mL              2/22/23  26 mL              1/21/22  0 mL                7/22/21  109 mL            6/29/21    Past Medical History:   Diagnosis Date    BPH with obstruction/lower urinary tract symptoms     Cancer     prostate    Carcinoma of appendix     Coronary artery disease     Elevated PSA     Gilbert's disease 09/28/2021    Hyperlipidemia     Hypertension     MI (myocardial infarction) 2005    Primary appendiceal adenocarcinoma     Stented coronary artery 2005    RCA       Past Surgical History:   Procedure Laterality Date    APPENDECTOMY      CARDIAC SURGERY      stent coronary    COLON SURGERY      CYSTOSCOPY      CYSTOSCOPY N/A 6/2/2021    Procedure: CYSTOSCOPY;  Surgeon: Craig Reza Jr., MD;  Location: [facility] OR;  Service: Urology;  Laterality: N/A;    EAR EXAMINATION UNDER ANESTHESIA      LAPAROSCOPIC APPENDECTOMY N/A 5/6/2021    Procedure: APPENDECTOMY, LAPAROSCOPIC;  Surgeon: Sundar Garcia MD;  Location: [facility] OR;  Service: General;  Laterality: N/A;    LAPAROSCOPIC CHOLECYSTECTOMY N/A 10/10/2021    Procedure: CHOLECYSTECTOMY, LAPAROSCOPIC;   "Surgeon: Eulogio Yusuf MD;  Location: [facility] OR;  Service: General;  Laterality: N/A;    SURGICAL REMOVAL OF ILEUM WITH CECUM  5/6/2021    Procedure: EXCISION, CECUM WITH ILEUM;  Surgeon: Sundar Garcia MD;  Location: [facility] OR;  Service: General;;    TRANSRECTAL ULTRASOUND EXAMINATION N/A 6/2/2021    Procedure: ULTRASOUND, RECTAL APPROACH;  Surgeon: Craig Reza Jr., MD;  Location: [facility] OR;  Service: Urology;  Laterality: N/A;    TRANSURETHRAL RESECTION OF PROSTATE N/A 6/22/2021    Procedure: TURP (TRANSURETHRAL RESECTION OF PROSTATE);  Surgeon: Craig Reza Jr., MD;  Location: [facility] OR;  Service: Urology;  Laterality: N/A;       Review of patient's allergies indicates:   Allergen Reactions    Ciprofloxacin      vomiting    Rapaflo [silodosin]      Urinating increased, kidney pain    Simvastatin Other (See Comments)     Severe muscle pain       Medications Reviewed: see MAR    FOCUSED PHYSICAL EXAM:    There were no vitals filed for this visit.  Body mass index is 36.79 kg/m². Weight: 103.4 kg (227 lb 15.3 oz) Height: 5' 6" (167.6 cm)       General: Alert, cooperative, no distress, appears stated age  Abdomen: Soft, non-tender, no CVA tenderness, non-distended      LABS:    Recent Results (from the past 336 hour(s))   POCT Bladder Scan    Collection Time: 12/06/23 10:23 AM   Result Value Ref Range    POC Residual Urine Volume 32 0 - 100 mL           Assessment/Diagnosis:    1. Urinary retention  POCT Urinalysis(Instrument)    POCT Bladder Scan    Urine culture    Procedure Order to Urology      2. Gross hematuria  Urine culture    Procedure Order to Urology          Plans:    - I spent 30 minutes of the day of this encounter preparing for, treating and managing this patient. We discussed the etiology and management of the patient's gross hematuria. Explained that hematuria is multi-factorial and can be secondary to  cancer, obstructive uropathy, nephritis,  trauma,  infections, thrombosis, " nephrolithiasis, bleeding disorders and medications. We discussed hematuria work-up and the benefit of further evaluation with cystourethroscopy and CT urogram to rule out any malignancy, stones or other pathology. All risks, benefits and alternatives discussed at length and all questions answered.    - CT urogram next available.   - Urine culture today.   - RX for Finasteride 5 mg PO daily.   - Scheduled for cystoscopy and TRUS on 12/13/23.

## 2023-12-06 NOTE — PROGRESS NOTES
Procedure Order to Urology [[id number]]    Electronically signed by: Craig Reza Jr., MD on 12/06/23 1019 Status: Active   Ordering user: Craig Reza Jr., MD 12/06/23 1019 Authorized by: Craig Reza Jr., MD   Ordering mode: Standard   Frequency:  12/06/23 -     Diagnoses  Urinary retention [R33.9]  Gross hematuria [R31.0]   Questionnaire    Question Answer   Procedure Cystoscopy  TRUS/Trans Rectal Ultrasound   Pre-op Diagnosis Urinary retention   Facility Name: [facility]   Order comments: Cystoscopy and TRUS on 12/13/23 at the [facility]. Thanks.      Reprint Order Requisition    Procedure Order to Urology (Order #[id number]) on 12/6/23      Future Order Information    Expected Expires      12/6/2023 12/6/2024               Lab Collection Information      Lab Exception Handling Use Only - Not for Clinical Use    Procedure Order to Urology (Order #[id number]) on 12/6/23    Epic Interface Status    No data filed   ASC, Please order Urine POCT without micro . Local sedation

## 2023-12-06 NOTE — H&P (VIEW-ONLY)
[facility] Established Patient/H&P:    Ryan Stewart is a 78 y.o. male who presents for follow up for urinary retention and gross hematuria.     Patient with a history of elevated PSA, gross hematuria and enlarged prostate previously managed with Dr. Quintanilla. He presented to [facility] ED with urinary retention after appendectomy on 5/6/21 for ruptured appendicitis. 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     Alexis catheter placed with 1.5L urine. CT renal stone on 5/17/21 revealed a significantly enlarged prostate.  He underwent voiding trial in clinic with NP Eliana Baez, but had the alexis replaced. He has since developed gross hematuria. He states that prior to his appendectomy he was voiding well. He has been on Flomax for several years. Now on Finasteride.     On review of records, he has a history of elevated PSA s/p biopsy in [place] in 2002. Declined any further biopsies. He is on Plavix for history of MI.        Interval History     7/22/21: Patient with a 170 cc prostate s/p TURP on 6/22/21. Pathology negative. Discharged home POD 2. Underwent successful voiding trial 1 week post-op. States his lower urinary tract symptoms have improved significantly. Remains on Flomax and Finasteride.      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      1/21/22: Patient is doing well. Continues to void fine with mild lower urinary tract symptoms. IPSS 4. QoL 0. PVR 26 mL. Discontinued Flomax and Finasteride.     2/22/23: Here for follow up. Doing well with no complaints. IPSS remains at 4. QoL 0. PVR 71 mL. He underwent CT abdomen pelvis with contrast which showed an enlarged prostate, 13 mm lesion on the left kidney - not changed in size and fatty liver.     11/12/23: Patient presented to the emergency department on 11/9/23 with gross hematuria. 18 [place] alexis placed and started on CBI. H/H dropped. Evaluated by Dr. Flowers who recommended up-sizing catheter and  restarting CBI. Attempted to clamp irrigation yesterday, but hematuria recurred. Urine culture negative.    12/6/23: Alexis has been removed after discharge. Here today for follow up. States he has had some weakening of his urinary stream since alexis removal. IPSS up to 13. PVR 32 mL today. Has not restarted his Plavix.  Urine dipstick with trace blood, protein and small leukocytes.      Denies any fever, chills, flank pain, nephrolithiasis,  trauma or history of  malignancy.         PSA  5.3                   11/3/20  4.7                   8/19/19  5.3                   9/4/18  6.0                   7/8/16  8.9                   9/4/13     Urine culture  No growth        11/9/23  Enterococcus  6/2/21  Enterococcus  5/6/21     IPSS    QoL  13 5 12/6/23  4          0          2/22/23  6          1          7/22/21     PVR  32 mL  12/6/23  50 mL  11/20/23  71 mL              2/22/23  26 mL              1/21/22  0 mL                7/22/21  109 mL            6/29/21    Past Medical History:   Diagnosis Date    BPH with obstruction/lower urinary tract symptoms     Cancer     prostate    Carcinoma of appendix     Coronary artery disease     Elevated PSA     Gilbert's disease 09/28/2021    Hyperlipidemia     Hypertension     MI (myocardial infarction) 2005    Primary appendiceal adenocarcinoma     Stented coronary artery 2005    RCA       Past Surgical History:   Procedure Laterality Date    APPENDECTOMY      CARDIAC SURGERY      stent coronary    COLON SURGERY      CYSTOSCOPY      CYSTOSCOPY N/A 6/2/2021    Procedure: CYSTOSCOPY;  Surgeon: Craig Reza Jr., MD;  Location: [facility] OR;  Service: Urology;  Laterality: N/A;    EAR EXAMINATION UNDER ANESTHESIA      LAPAROSCOPIC APPENDECTOMY N/A 5/6/2021    Procedure: APPENDECTOMY, LAPAROSCOPIC;  Surgeon: Sundar Garcia MD;  Location: [facility] OR;  Service: General;  Laterality: N/A;    LAPAROSCOPIC CHOLECYSTECTOMY N/A 10/10/2021    Procedure: CHOLECYSTECTOMY, LAPAROSCOPIC;   "Surgeon: Eulogio Yusuf MD;  Location: [facility] OR;  Service: General;  Laterality: N/A;    SURGICAL REMOVAL OF ILEUM WITH CECUM  5/6/2021    Procedure: EXCISION, CECUM WITH ILEUM;  Surgeon: Sundar Garcia MD;  Location: [facility] OR;  Service: General;;    TRANSRECTAL ULTRASOUND EXAMINATION N/A 6/2/2021    Procedure: ULTRASOUND, RECTAL APPROACH;  Surgeon: Craig Reza Jr., MD;  Location: [facility] OR;  Service: Urology;  Laterality: N/A;    TRANSURETHRAL RESECTION OF PROSTATE N/A 6/22/2021    Procedure: TURP (TRANSURETHRAL RESECTION OF PROSTATE);  Surgeon: Craig Reza Jr., MD;  Location: [facility] OR;  Service: Urology;  Laterality: N/A;       Review of patient's allergies indicates:   Allergen Reactions    Ciprofloxacin      vomiting    Rapaflo [silodosin]      Urinating increased, kidney pain    Simvastatin Other (See Comments)     Severe muscle pain       Medications Reviewed: see MAR    FOCUSED PHYSICAL EXAM:    There were no vitals filed for this visit.  Body mass index is 36.79 kg/m². Weight: 103.4 kg (227 lb 15.3 oz) Height: 5' 6" (167.6 cm)       General: Alert, cooperative, no distress, appears stated age  Abdomen: Soft, non-tender, no CVA tenderness, non-distended      LABS:    Recent Results (from the past 336 hour(s))   POCT Bladder Scan    Collection Time: 12/06/23 10:23 AM   Result Value Ref Range    POC Residual Urine Volume 32 0 - 100 mL           Assessment/Diagnosis:    1. Urinary retention  POCT Urinalysis(Instrument)    POCT Bladder Scan    Urine culture    Procedure Order to Urology      2. Gross hematuria  Urine culture    Procedure Order to Urology          Plans:    - I spent 30 minutes of the day of this encounter preparing for, treating and managing this patient. We discussed the etiology and management of the patient's gross hematuria. Explained that hematuria is multi-factorial and can be secondary to  cancer, obstructive uropathy, nephritis,  trauma,  infections, thrombosis, " nephrolithiasis, bleeding disorders and medications. We discussed hematuria work-up and the benefit of further evaluation with cystourethroscopy and CT urogram to rule out any malignancy, stones or other pathology. All risks, benefits and alternatives discussed at length and all questions answered.    - CT urogram next available.   - Urine culture today.   - RX for Finasteride 5 mg PO daily.   - Scheduled for cystoscopy and TRUS on 12/13/23.

## 2023-12-06 NOTE — H&P (VIEW-ONLY)
[facility] Established Patient/H&P:    Ryan Stewart is a 78 y.o. male who presents for follow up for urinary retention and gross hematuria.     Patient with a history of elevated PSA, gross hematuria and enlarged prostate previously managed with Dr. Quintanilla. He presented to [facility] ED with urinary retention after appendectomy on 5/6/21 for ruptured appendicitis. 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     Alexis catheter placed with 1.5L urine. CT renal stone on 5/17/21 revealed a significantly enlarged prostate.  He underwent voiding trial in clinic with NP Eliana Baez, but had the alexis replaced. He has since developed gross hematuria. He states that prior to his appendectomy he was voiding well. He has been on Flomax for several years. Now on Finasteride.     On review of records, he has a history of elevated PSA s/p biopsy in [place] in 2002. Declined any further biopsies. He is on Plavix for history of MI.        Interval History     7/22/21: Patient with a 170 cc prostate s/p TURP on 6/22/21. Pathology negative. Discharged home POD 2. Underwent successful voiding trial 1 week post-op. States his lower urinary tract symptoms have improved significantly. Remains on Flomax and Finasteride.      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      1/21/22: Patient is doing well. Continues to void fine with mild lower urinary tract symptoms. IPSS 4. QoL 0. PVR 26 mL. Discontinued Flomax and Finasteride.     2/22/23: Here for follow up. Doing well with no complaints. IPSS remains at 4. QoL 0. PVR 71 mL. He underwent CT abdomen pelvis with contrast which showed an enlarged prostate, 13 mm lesion on the left kidney - not changed in size and fatty liver.     11/12/23: Patient presented to the emergency department on 11/9/23 with gross hematuria. 18 [place]ian alexis placed and started on CBI. H/H dropped. Evaluated by Dr. Flowers who recommended up-sizing catheter and  restarting CBI. Attempted to clamp irrigation yesterday, but hematuria recurred. Urine culture negative.    12/6/23: Alexis has been removed after discharge. Here today for follow up. States he has had some weakening of his urinary stream since alexis removal. IPSS up to 13. PVR 32 mL today. Has not restarted his Plavix.  Urine dipstick with trace blood, protein and small leukocytes.      Denies any fever, chills, flank pain, nephrolithiasis,  trauma or history of  malignancy.         PSA  5.3                   11/3/20  4.7                   8/19/19  5.3                   9/4/18  6.0                   7/8/16  8.9                   9/4/13     Urine culture  No growth        11/9/23  Enterococcus  6/2/21  Enterococcus  5/6/21     IPSS    QoL  13 5 12/6/23  4          0          2/22/23  6          1          7/22/21     PVR  32 mL  12/6/23  50 mL  11/20/23  71 mL              2/22/23  26 mL              1/21/22  0 mL                7/22/21  109 mL            6/29/21    Past Medical History:   Diagnosis Date    BPH with obstruction/lower urinary tract symptoms     Cancer     prostate    Carcinoma of appendix     Coronary artery disease     Elevated PSA     Gilbert's disease 09/28/2021    Hyperlipidemia     Hypertension     MI (myocardial infarction) 2005    Primary appendiceal adenocarcinoma     Stented coronary artery 2005    RCA       Past Surgical History:   Procedure Laterality Date    APPENDECTOMY      CARDIAC SURGERY      stent coronary    COLON SURGERY      CYSTOSCOPY      CYSTOSCOPY N/A 6/2/2021    Procedure: CYSTOSCOPY;  Surgeon: Craig Reza Jr., MD;  Location: [facility] OR;  Service: Urology;  Laterality: N/A;    EAR EXAMINATION UNDER ANESTHESIA      LAPAROSCOPIC APPENDECTOMY N/A 5/6/2021    Procedure: APPENDECTOMY, LAPAROSCOPIC;  Surgeon: Sundar Garcia MD;  Location: [facility] OR;  Service: General;  Laterality: N/A;    LAPAROSCOPIC CHOLECYSTECTOMY N/A 10/10/2021    Procedure: CHOLECYSTECTOMY, LAPAROSCOPIC;   "Surgeon: Eulogio Yusuf MD;  Location: [facility] OR;  Service: General;  Laterality: N/A;    SURGICAL REMOVAL OF ILEUM WITH CECUM  5/6/2021    Procedure: EXCISION, CECUM WITH ILEUM;  Surgeon: Sundar Garcia MD;  Location: [facility] OR;  Service: General;;    TRANSRECTAL ULTRASOUND EXAMINATION N/A 6/2/2021    Procedure: ULTRASOUND, RECTAL APPROACH;  Surgeon: Craig Reza Jr., MD;  Location: [facility] OR;  Service: Urology;  Laterality: N/A;    TRANSURETHRAL RESECTION OF PROSTATE N/A 6/22/2021    Procedure: TURP (TRANSURETHRAL RESECTION OF PROSTATE);  Surgeon: Craig Reza Jr., MD;  Location: [facility] OR;  Service: Urology;  Laterality: N/A;       Review of patient's allergies indicates:   Allergen Reactions    Ciprofloxacin      vomiting    Rapaflo [silodosin]      Urinating increased, kidney pain    Simvastatin Other (See Comments)     Severe muscle pain       Medications Reviewed: see MAR    FOCUSED PHYSICAL EXAM:    There were no vitals filed for this visit.  Body mass index is 36.79 kg/m². Weight: 103.4 kg (227 lb 15.3 oz) Height: 5' 6" (167.6 cm)       General: Alert, cooperative, no distress, appears stated age  Abdomen: Soft, non-tender, no CVA tenderness, non-distended      LABS:    Recent Results (from the past 336 hour(s))   POCT Bladder Scan    Collection Time: 12/06/23 10:23 AM   Result Value Ref Range    POC Residual Urine Volume 32 0 - 100 mL           Assessment/Diagnosis:    1. Urinary retention  POCT Urinalysis(Instrument)    POCT Bladder Scan    Urine culture    Procedure Order to Urology      2. Gross hematuria  Urine culture    Procedure Order to Urology          Plans:    - I spent 30 minutes of the day of this encounter preparing for, treating and managing this patient. We discussed the etiology and management of the patient's gross hematuria. Explained that hematuria is multi-factorial and can be secondary to  cancer, obstructive uropathy, nephritis,  trauma,  infections, thrombosis, " nephrolithiasis, bleeding disorders and medications. We discussed hematuria work-up and the benefit of further evaluation with cystourethroscopy and CT urogram to rule out any malignancy, stones or other pathology. All risks, benefits and alternatives discussed at length and all questions answered.    - CT urogram next available.   - Urine culture today.   - RX for Finasteride 5 mg PO daily.   - Scheduled for cystoscopy and TRUS on 12/13/23.

## 2023-12-08 LAB — BACTERIA UR CULT: ABNORMAL

## 2023-12-08 NOTE — DISCHARGE INSTRUCTIONS
Cystoscopy    Cystoscopy is a procedure that lets your doctor look directly inside your urethra and bladder. It can be used to:  Help diagnose a problem with your urethra, bladder, or kidneys.  Take a sample (biopsy) of bladder or urethral tissue.  Treat certain problems (such as removing kidney stones).  Place a stent to bypass an obstruction.  Take special X-rays of the kidneys.  Based on the findings, your doctor may recommend other tests or treatments.  What is a cystoscope?  A cystoscope is a telescope-like instrument that contains lenses and fiberoptics (small glass wires that make bright light). The cystoscope may be straight and rigid, or flexible to bend around curves in the urethra. The doctor may look directly into the cystoscope, or project the image onto a monitor.  Getting ready  Ask your doctor if you should stop taking any medicines before the procedure.  Ask whether you should avoid eating or drinking anything after midnight before the procedure.  Follow any other instructions your doctor gives you.  Tell your doctor before the exam if you:  Take any medicines, such as aspirin or blood thinners  Have allergies to any medicines  Are pregnant   The procedure  Cystoscopy is done in the doctors office, surgery center, or hospital. The doctor and a nurse are present during the procedure. It takes only a few minutes, longer if a biopsy, X-ray, or treatment needs to be done.  During the procedure:  You lie on an exam table on your back, knees bent and legs apart. You are covered with a drape.  Your urethra and the area around it are washed. Anesthetic jelly may be applied to numb the urethra. Other pain medicine is usually not needed. In some cases, you may be offered a mild sedative to help you relax. If a more extensive procedure is to be done, such as a biopsy or kidney stone removal, general anesthesia may be needed.  The cystoscope is inserted. A sterile fluid is put into the bladder to expand it.  You may feel pressure from this fluid.  When the procedure is done, the cystoscope is removed.  After the procedure  If you had a sedative, general anesthesia, or spinal anesthesia, you must have someone drive you home. Once youre home:  Drink plenty of fluids.  You may have burning or light bleeding when you urinate--this is normal.  Medicines may be prescribed to ease any discomfort or prevent infection. Take these as directed.  Call your doctor if you have heavy bleeding or blood clots, burning that lasts more than a day, a fever over 100°F  (38° C), or trouble urinating.    After Surgery:  Always be aware that any surgery can cause these symptoms:    Pain- Medication can be prescribed for pain to decrease your pain but may not completely take your pain away.  Over the Counter pain medicine my be enough and you can always use Ice and rest to help ease pain.    Bleeding- a little bleeding after a surgery is usually within normal.  If there is a lot of blood you need to notify your MD.  Emergency treatments of bleeding are cold application, elevation of the bleeding site and compression.    Infection- Infection after surgery is NOT a normal occurrence.  Signs of infection are fever, swelling, hot to touch the incision.  If this occurs notify your MD immediately.    Nausea- this can be common after a surgery especially if you have had anesthesia medicine or are taking pain medicine.  Staying on clear liquids, bland foods, gingerale, or over the counter anti nausea medicines can help.  If you vomit more than once, notify your MD.  Anti Nausea medicines can be prescribed.    Transrectal Ultrasound   A transrectal ultrasound is an imaging test. It uses sound waves to create pictures of a mans prostate gland to determine its size.Your prostate gland is in front of your rectum and if too large may become inflamed and impede the flow of urine. For this test, a special probe (transducer) is placed directly into your  rectum.     Before leaving, you may need to wait for a short time while the images are reviewed. In most cases, you can go back to your normal routine after the test.

## 2023-12-11 ENCOUNTER — TELEPHONE (OUTPATIENT)
Dept: UROLOGY | Facility: CLINIC | Age: 78
End: 2023-12-11
Payer: MEDICARE

## 2023-12-11 RX ORDER — CEPHALEXIN 500 MG/1
500 CAPSULE ORAL EVERY 8 HOURS
Qty: 15 CAPSULE | Refills: 0 | Status: SHIPPED | OUTPATIENT
Start: 2023-12-11 | End: 2023-12-16

## 2023-12-11 NOTE — TELEPHONE ENCOUNTER
----- Message from Kelly Rdz sent at 12/11/2023 11:13 AM CST -----  Contact: self  Type:  Patient Returning Call    Who Called:  pt  Who Left Message for Patient:  jessica?  Does the patient know what this is regarding?:  ?  Best Call Back Number:  [phone]   Additional Information:  please call

## 2023-12-12 ENCOUNTER — HOSPITAL ENCOUNTER (OUTPATIENT)
Dept: RADIOLOGY | Facility: HOSPITAL | Age: 78
Discharge: HOME | End: 2023-12-12
Attending: NURSE PRACTITIONER
Payer: MEDICARE

## 2023-12-12 DIAGNOSIS — R31.0 GROSS HEMATURIA: Chronic | ICD-10-CM

## 2023-12-12 PROCEDURE — 74178 CT ABD&PLV WO CNTR FLWD CNTR: CPT | Mod: TC

## 2023-12-12 PROCEDURE — 25500020 PHARM REV CODE 255

## 2023-12-12 PROCEDURE — 74178 CT ABD&PLV WO CNTR FLWD CNTR: CPT | Mod: 26,,, | Performed by: RADIOLOGY

## 2023-12-12 PROCEDURE — 74178 CT UROGRAM ABD PELVIS W WO: ICD-10-PCS | Mod: 26,,, | Performed by: RADIOLOGY

## 2023-12-12 RX ADMIN — IOHEXOL 125 ML: 350 INJECTION, SOLUTION INTRAVENOUS at 04:12

## 2023-12-13 ENCOUNTER — HOSPITAL ENCOUNTER (OUTPATIENT)
Facility: HOSPITAL | Age: 78
Discharge: HOME | End: 2023-12-13
Attending: UROLOGY | Admitting: UROLOGY
Payer: MEDICARE

## 2023-12-13 DIAGNOSIS — R31.0 GROSS HEMATURIA: ICD-10-CM

## 2023-12-13 DIAGNOSIS — R82.998 OTHER ABNORMAL FINDINGS IN URINE: ICD-10-CM

## 2023-12-13 DIAGNOSIS — R33.9 URINARY RETENTION: Primary | ICD-10-CM

## 2023-12-13 DIAGNOSIS — N40.0 BPH (BENIGN PROSTATIC HYPERPLASIA): ICD-10-CM

## 2023-12-13 DIAGNOSIS — N40.0 BENIGN PROSTATIC HYPERPLASIA, UNSPECIFIED WHETHER LOWER URINARY TRACT SYMPTOMS PRESENT: Primary | ICD-10-CM

## 2023-12-13 PROCEDURE — 25000003 PHARM REV CODE 250: Performed by: UROLOGY

## 2023-12-13 PROCEDURE — 76872 US TRANSRECTAL: CPT | Mod: 26,,, | Performed by: UROLOGY

## 2023-12-13 PROCEDURE — 76872 US TRANSRECTAL: CPT | Performed by: UROLOGY

## 2023-12-13 PROCEDURE — 52000 CYSTOURETHROSCOPY: CPT | Mod: ,,, | Performed by: UROLOGY

## 2023-12-13 PROCEDURE — 52000 CYSTOURETHROSCOPY: CPT | Performed by: UROLOGY

## 2023-12-13 PROCEDURE — 87086 URINE CULTURE/COLONY COUNT: CPT | Performed by: UROLOGY

## 2023-12-13 PROCEDURE — A4217 STERILE WATER/SALINE, 500 ML: HCPCS | Performed by: UROLOGY

## 2023-12-13 PROCEDURE — 52000 PR CYSTOURETHROSCOPY: ICD-10-PCS | Mod: ,,, | Performed by: UROLOGY

## 2023-12-13 PROCEDURE — 76872 PR US TRANSRECTAL: ICD-10-PCS | Mod: 26,,, | Performed by: UROLOGY

## 2023-12-13 RX ORDER — CEFAZOLIN SODIUM 2 G/50ML
2 SOLUTION INTRAVENOUS
Status: CANCELLED | OUTPATIENT
Start: 2023-12-13

## 2023-12-13 RX ORDER — WATER 1 ML/ML
IRRIGANT IRRIGATION
Status: DISCONTINUED | OUTPATIENT
Start: 2023-12-13 | End: 2023-12-13 | Stop reason: HOSPADM

## 2023-12-13 RX ORDER — LIDOCAINE HYDROCHLORIDE 20 MG/ML
JELLY TOPICAL
Status: DISCONTINUED | OUTPATIENT
Start: 2023-12-13 | End: 2023-12-13 | Stop reason: HOSPADM

## 2023-12-13 NOTE — PLAN OF CARE
Discharge instructions given to pt, verbalized understanding.  Tolerating fluids.  Denies pain.  Pre admit testing 12/18.  Ambulating out to self care in no distress.

## 2023-12-13 NOTE — OP NOTE
[facility]  Operative/Discharge Note    Date: 12/13/2023    Pre-Op Diagnosis: Gross hematuria, lower urinary tract symptoms, UTI    Post-Op Diagnosis: Same    Procedure(s) Performed:   1.  Cystoscopy  2.  Transrectal ultrasound     Specimen(s): Urine culture    Staff Surgeon: Craig Reza MD    Assistant Surgeon: Craig Reza Jr, MD    Anesthesia: Local anesthesia topical 2% lidocaine gel    Indications: Ryan Stewart is a 78 y.o. male with gross hematuria and lower urinary tract symptoms.     Findings: 140 cc prostate with trilobar hypertrophy and small prostate calcifications noted. Severe trabeculations and numerous diverticula.     Estimated Blood Loss: min    Drains: None    HARESH: Anodular    Procedure in Detail:  After risks, benefits and possible complications of cystoscopy and TRUS were explained, the patient elected to undergo the procedure and informed consent was obtained. All questions were answered in the sulaiman-operative area. The patient was transferred to the cystoscopy suite and placed in the lateral position.     TRUS probe was inserted into the rectum and the prostate measurement was 140 cc.     The patient was placed in the lithotomy position and then prepped and draped in the usual sterile fashion. Lidocaine jelly was administered for local anesthesia. Time out was performed.    A flexible cystoscope was introduced into the bladder per urethra. This passed easily.  The entire urethra was visualized which showed no masses or strictures.  The prostate was 4 cm in length and appeared obstructing with trilobar hypertrophy. The right and left ureteral orifices were identified in the normal anatomic position and were seen effluxing clear urine.  Formal cystoscopy was performed which revealed no masses or lesions suspicious for malignancy, severe trabeculations, no bladder stones and several bladder diverticula. Small prostatic calcifications noted.       The patient tolerated the procedure  well and was transferred to recovery in stable condition.    Disposition: Home    Discharge home today status post uncomplicated procedure as above  Diet - resume home diet  Follow up: TURP on 12/26/23. Phone pre-op prior.   Instructions: Continue home medication.   Meds:     Medication List        CONTINUE taking these medications      albuterol 90 mcg/actuation inhaler  Commonly known as: PROVENTIL/VENTOLIN HFA  Inhale 2 puffs into the lungs every 6 (six) hours as needed for Wheezing or Shortness of Breath. Rescue     amLODIPine 10 MG tablet  Commonly known as: NORVASC  Take 1 tablet (10 mg total) by mouth every evening.     aspirin 81 MG EC tablet  Commonly known as: ECOTRIN  Take 1 tablet (81 mg total) by mouth every morning. Start on Sunday 6/28     capecitabine 500 MG Tab  Commonly known as: XELODA  Takes 3 tablets by mouth 2 times daily for 2 weeks on 1 week off..     carvediloL 12.5 MG tablet  Commonly known as: COREG     cephALEXin 500 MG capsule  Commonly known as: KEFLEX  Take 1 capsule (500 mg total) by mouth every 8 (eight) hours. for 5 days     CHOLECALCIFEROL (VITAMIN D3) ORAL     clopidogreL 75 mg tablet  Commonly known as: PLAVIX  Take 1 tablet (75 mg total) by mouth every morning. Start on Sunday 6/28     finasteride 5 mg tablet  Commonly known as: PROSCAR  Take 1 tablet (5 mg total) by mouth once daily.     fish oil-omega-3 fatty acids 300-1,000 mg capsule     fluticasone propionate 50 mcg/actuation nasal spray  Commonly known as: FLONASE     HYDROcodone-acetaminophen 5-325 mg per tablet  Commonly known as: NORCO  Take 1 tablet by mouth every 6 (six) hours as needed for Pain.     lovastatin 40 MG tablet  Commonly known as: MEVACOR     MICARDIS 40 MG Tab  Generic drug: telmisartan     NITROSTAT 0.4 MG SL tablet  Generic drug: nitroGLYCERIN     * tamsulosin 0.4 mg Cap  Commonly known as: FLOMAX  Take 1 capsule (0.4 mg total) by mouth once daily. Take at bedtime     * tamsulosin 0.4 mg Cap  Commonly  known as: FLOMAX  Take 1 capsule (0.4 mg total) by mouth once daily.           * This list has 2 medication(s) that are the same as other medications prescribed for you. Read the directions carefully, and ask your doctor or other care provider to review them with you.                  Craig Reza Jr, MD

## 2023-12-13 NOTE — PROGRESS NOTES
Procedure Order to Urology [[id number]]    Electronically signed by: Craig Reza Jr., MD on 12/13/23 1455 Status: Active   Ordering user: Craig Reza Jr., MD 12/13/23 1455 Authorized by: Craig Reza Jr., MD   Ordering mode: Standard   Frequency:  12/13/23 -     Diagnoses  Urinary retention [R33.9]  Gross hematuria [R31.0]   Questionnaire    Question Answer   Procedure TURP   Pre-op Diagnosis Enlarged prostate with lower urinary tract symptoms (LUTS)   Facility Name: [facility]   Order comments: TURP on 12/26/23 at [facility].   Please order,outpatient hospital,  CBC, BMP A/A and culture , Ekg if over 40 , IV start, NPO , General anesthesia, Ancef 2 grams, ( alternative for pcn allergy is Cipro 400mg IV ) [id number]

## 2023-12-14 VITALS
BODY MASS INDEX: 36.63 KG/M2 | OXYGEN SATURATION: 98 % | DIASTOLIC BLOOD PRESSURE: 84 MMHG | HEIGHT: 66 IN | TEMPERATURE: 98 F | WEIGHT: 227.94 LBS | HEART RATE: 73 BPM | SYSTOLIC BLOOD PRESSURE: 148 MMHG | RESPIRATION RATE: 18 BRPM

## 2023-12-16 LAB — BACTERIA UR CULT: NO GROWTH

## 2023-12-21 ENCOUNTER — OFFICE VISIT (OUTPATIENT)
Dept: HEMATOLOGY/ONCOLOGY | Facility: CLINIC | Age: 78
End: 2023-12-21
Payer: MEDICARE

## 2023-12-21 VITALS
TEMPERATURE: 98 F | HEART RATE: 76 BPM | WEIGHT: 220.44 LBS | DIASTOLIC BLOOD PRESSURE: 73 MMHG | BODY MASS INDEX: 35.43 KG/M2 | OXYGEN SATURATION: 97 % | SYSTOLIC BLOOD PRESSURE: 152 MMHG | RESPIRATION RATE: 20 BRPM | HEIGHT: 66 IN

## 2023-12-21 DIAGNOSIS — C18.1 CARCINOMA OF APPENDIX: Primary | ICD-10-CM

## 2023-12-21 DIAGNOSIS — E53.8 B12 DEFICIENCY: ICD-10-CM

## 2023-12-21 DIAGNOSIS — E80.4 GILBERT'S SYNDROME: ICD-10-CM

## 2023-12-21 PROCEDURE — 99214 PR OFFICE/OUTPT VISIT, EST, LEVL IV, 30-39 MIN: ICD-10-PCS | Mod: S$PBB,,, | Performed by: INTERNAL MEDICINE

## 2023-12-21 PROCEDURE — 99215 OFFICE O/P EST HI 40 MIN: CPT | Mod: PBBFAC,PN | Performed by: INTERNAL MEDICINE

## 2023-12-21 PROCEDURE — 99999 PR PBB SHADOW E&M-EST. PATIENT-LVL V: ICD-10-PCS | Mod: PBBFAC,,, | Performed by: INTERNAL MEDICINE

## 2023-12-21 PROCEDURE — 99214 OFFICE O/P EST MOD 30 MIN: CPT | Mod: S$PBB,,, | Performed by: INTERNAL MEDICINE

## 2023-12-21 PROCEDURE — 99999 PR PBB SHADOW E&M-EST. PATIENT-LVL V: CPT | Mod: PBBFAC,,, | Performed by: INTERNAL MEDICINE

## 2023-12-21 NOTE — PROGRESS NOTES
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  History of benign prostate hypertrophy with obstructive urine output.  Status post TURP by Dr Craig Reza on 21    History of prostate cancer.  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  Associated with acute appendicitis with a rupture.  Five benign lymph nodes retrieved.    Tumor size 0.8 cm.  Histological type adenocarcinoma.  Histologic  Grade moderately    Differentiated.  Tumor invades muscular peripheral.  Margin negative.  Tumor deposit negative.  Lymphovascular invasion negative.  Perineural invasion    Negative.  Pathological staging [id number]    On keep side being 1500mg b.i.d. daily 2 weeks on 1 week off    Past Medical History:   Diagnosis Date    BPH with obstruction/lower urinary tract symptoms     Cancer     prostate    Carcinoma of appendix     Coronary artery disease     Elevated PSA     Gilbert's disease 2021    Hyperlipidemia     Hypertension     MI (myocardial infarction)     Primary appendiceal adenocarcinoma     Stented coronary artery 2005    RCA     Social History     Socioeconomic History    Marital status:    Tobacco Use    Smoking status: Former     Current packs/day: 0.00     Types: Cigarettes     Quit date: 1991     Years since quittin.5    Smokeless tobacco: Never    Tobacco comments:     cigars and pipe   Substance and Sexual Activity    Alcohol use: Not Currently    Drug use: Not Currently         Objective    Physical Exam     Vitals:    23 1328   BP: (!) 152/73   Pulse: 76   Resp: 20   Temp: 97.5 °F (36.4 °C)     Awake alert no acute distress  Normocephalic atraumatic pupils equal round reactive  Normal respiratory effort  Normal rate  Distal pulses intact  Peripheral edema  Grossly cranial nerves 2-12 grossly intact sensorimotor grossly intact  Patient ambulatory on his own  power        CMP  Sodium   Date Value Ref Range Status   12/12/2023 139 136 - 145 mmol/L Final     Potassium   Date Value Ref Range Status   12/12/2023 4.3 3.5 - 5.1 mmol/L Final     Chloride   Date Value Ref Range Status   12/12/2023 104 95 - 110 mmol/L Final     CO2   Date Value Ref Range Status   12/12/2023 26 23 - 29 mmol/L Final     Glucose   Date Value Ref Range Status   12/12/2023 103 70 - 110 mg/dL Final     BUN   Date Value Ref Range Status   12/12/2023 15 8 - 23 mg/dL Final     Creatinine   Date Value Ref Range Status   12/12/2023 0.9 0.5 - 1.4 mg/dL Final   12/12/2023 0.9 0.5 - 1.4 mg/dL Final     Calcium   Date Value Ref Range Status   12/12/2023 8.7 8.7 - 10.5 mg/dL Final     Total Protein   Date Value Ref Range Status   12/12/2023 6.7 6.0 - 8.4 g/dL Final     Albumin   Date Value Ref Range Status   12/12/2023 3.9 3.5 - 5.2 g/dL Final     Total Bilirubin   Date Value Ref Range Status   12/12/2023 1.9 (H) 0.1 - 1.0 mg/dL Final     Comment:     For infants and newborns, interpretation of results should be based  on gestational age, weight and in agreement with clinical  observations.    Premature Infant recommended reference ranges:  Up to 24 hours.............<8.0 mg/dL  Up to 48 hours............<12.0 mg/dL  3-5 days..................<15.0 mg/dL  6-29 days.................<15.0 mg/dL       Alkaline Phosphatase   Date Value Ref Range Status   12/12/2023 66 55 - 135 U/L Final     AST   Date Value Ref Range Status   12/12/2023 26 10 - 40 U/L Final     ALT   Date Value Ref Range Status   12/12/2023 30 10 - 44 U/L Final     Anion Gap   Date Value Ref Range Status   12/12/2023 9 8 - 16 mmol/L Final     eGFR if    Date Value Ref Range Status   07/08/2022 >60 >60 mL/min/1.73 m^2 Final     eGFR if non    Date Value Ref Range Status   07/08/2022 >60 >60 mL/min/1.73 m^2 Final     Comment:     Calculation used to obtain the estimated glomerular filtration  rate (eGFR) is the CKD-EPI  equation.        Lab Results   Component Value Date    WBC 5.93 12/12/2023    HGB 12.3 (L) 12/12/2023    HCT 37.8 (L) 12/12/2023    MCV 92 12/12/2023     12/12/2023 05/06/2021 surgical specimen  FINAL DIAGNOSIS:   05/10/2021 JAR/jr     ILEUM AND CECUM, ILEOCECETOMY   --APPENDIX WITH INVASIVE MODERATELY DIFFERENTIATED ADENOCARCINOMA.   --ASSOCIATED WITH ACUTE APPENDICITIS WITH RUPTURE.   --FIVE BENIGN LYMPH NODES.   --BACKGROUND COLON AND SMALL INTESTINE WITH SEROSAL ADHESIONS.   --SEE TUMOR SYNOPTIC REPORT.     Comment:   Additional dissection revealed one additional benign lymph node.     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 Sundar Garcia.  Unable to preform additional surgery due to patient co-morbility     With evidence of a rupture consider microscopic tumor deposit as a high risk factor.    Patient is 77 years old with comorbidities such as a previous diagnosis of MI as well as recent  TURP    Gilbert syndrome    CEA 2.2 one year post surgery resection     B12 146    Stable H/H    > Completed capecitabine therapy 10 cycles.  Capecitabine monotherapy 1000mg/m2 b.i.d. (from 1250mg/m2 due to underlying liver condition, age and comorbidities) daily 2 weeks on 1 week off Will start NCCN surveillance guidelines monitor.    > Rupture  high risk factor will have to consider microscopic tumor deposit.  However given patient's age and comorbidity it is unlikely that he will tolerate any aggressive measure.  I have spoken to surgical team, reported patient cannot tolerate additional surgery.    > review CT scan with patient today.  1.3 cm left kidney appear to be cystic.  Enlarged prostate.  Patient will follow up with Urology.    > s/p gallbladder infection, elevated transaminitis, cholecystectomy on 10/11/2021.  Follow up with surgery    > start on B12 daily with good respond will continue     > colonoscopy 02/28/2023 normal examination.  Anastomosis appear to be intact.  No evidence of polyps.  Repeat colonoscopy is not recommended for surveillance at this time. Dr Griffin Leonid    > RTC 4 month with lab

## 2023-12-26 ENCOUNTER — HOSPITAL ENCOUNTER (OUTPATIENT)
Facility: HOSPITAL | Age: 78
Discharge: HOME | End: 2023-12-27
Attending: UROLOGY | Admitting: HOSPITALIST
Payer: MEDICARE

## 2023-12-26 ENCOUNTER — ANESTHESIA (OUTPATIENT)
Dept: SURGERY | Facility: HOSPITAL | Age: 78
End: 2023-12-26
Payer: MEDICARE

## 2023-12-26 ENCOUNTER — ANESTHESIA EVENT (OUTPATIENT)
Dept: SURGERY | Facility: HOSPITAL | Age: 78
End: 2023-12-26
Payer: MEDICARE

## 2023-12-26 DIAGNOSIS — Z01.818 PREOP TESTING: ICD-10-CM

## 2023-12-26 DIAGNOSIS — N40.0 BENIGN PROSTATIC HYPERPLASIA, UNSPECIFIED WHETHER LOWER URINARY TRACT SYMPTOMS PRESENT: ICD-10-CM

## 2023-12-26 DIAGNOSIS — N40.1 BENIGN PROSTATIC HYPERPLASIA WITH URINARY RETENTION: Primary | ICD-10-CM

## 2023-12-26 DIAGNOSIS — R33.9 URINARY RETENTION: ICD-10-CM

## 2023-12-26 DIAGNOSIS — R33.8 BENIGN PROSTATIC HYPERPLASIA WITH URINARY RETENTION: Primary | ICD-10-CM

## 2023-12-26 LAB
APTT PPP: 25.1 SEC (ref 21–32)
INR PPP: 1 (ref 0.8–1.2)
PROTHROMBIN TIME: 10.9 SEC (ref 9–12.5)

## 2023-12-26 PROCEDURE — D9220A PRA ANESTHESIA: Mod: CRNA,,, | Performed by: STUDENT IN AN ORGANIZED HEALTH CARE EDUCATION/TRAINING PROGRAM

## 2023-12-26 PROCEDURE — 71000016 HC POSTOP RECOV ADDL HR: Performed by: UROLOGY

## 2023-12-26 PROCEDURE — 36000706: Performed by: UROLOGY

## 2023-12-26 PROCEDURE — 71000039 HC RECOVERY, EACH ADD'L HOUR: Performed by: UROLOGY

## 2023-12-26 PROCEDURE — 37000009 HC ANESTHESIA EA ADD 15 MINS: Performed by: UROLOGY

## 2023-12-26 PROCEDURE — 85610 PROTHROMBIN TIME: CPT | Performed by: ANESTHESIOLOGY

## 2023-12-26 PROCEDURE — 88305 TISSUE EXAM BY PATHOLOGIST: CPT | Mod: TC | Performed by: PATHOLOGY

## 2023-12-26 PROCEDURE — 63600175 PHARM REV CODE 636 W HCPCS: Performed by: UROLOGY

## 2023-12-26 PROCEDURE — 25000003 PHARM REV CODE 250: Performed by: ANESTHESIOLOGY

## 2023-12-26 PROCEDURE — 25000003 PHARM REV CODE 250: Performed by: STUDENT IN AN ORGANIZED HEALTH CARE EDUCATION/TRAINING PROGRAM

## 2023-12-26 PROCEDURE — 52630 PR REMV RESID OBSTRUC PROSTATE,>1 YR: ICD-10-PCS | Mod: ,,, | Performed by: UROLOGY

## 2023-12-26 PROCEDURE — 27201423 OPTIME MED/SURG SUP & DEVICES STERILE SUPPLY: Performed by: UROLOGY

## 2023-12-26 PROCEDURE — 63600175 PHARM REV CODE 636 W HCPCS: Performed by: ANESTHESIOLOGY

## 2023-12-26 PROCEDURE — 71000033 HC RECOVERY, INTIAL HOUR: Performed by: UROLOGY

## 2023-12-26 PROCEDURE — 71000015 HC POSTOP RECOV 1ST HR: Performed by: UROLOGY

## 2023-12-26 PROCEDURE — 99900035 HC TECH TIME PER 15 MIN (STAT)

## 2023-12-26 PROCEDURE — 36415 COLL VENOUS BLD VENIPUNCTURE: CPT | Performed by: ANESTHESIOLOGY

## 2023-12-26 PROCEDURE — D9220A PRA ANESTHESIA: ICD-10-PCS | Mod: CRNA,,, | Performed by: STUDENT IN AN ORGANIZED HEALTH CARE EDUCATION/TRAINING PROGRAM

## 2023-12-26 PROCEDURE — 52630 REMOVE PROSTATE REGROWTH: CPT | Mod: ,,, | Performed by: UROLOGY

## 2023-12-26 PROCEDURE — 27200651 HC AIRWAY, LMA: Performed by: ANESTHESIOLOGY

## 2023-12-26 PROCEDURE — D9220A PRA ANESTHESIA: ICD-10-PCS | Mod: ANES,,, | Performed by: ANESTHESIOLOGY

## 2023-12-26 PROCEDURE — D9220A PRA ANESTHESIA: Mod: ANES,,, | Performed by: ANESTHESIOLOGY

## 2023-12-26 PROCEDURE — 94799 UNLISTED PULMONARY SVC/PX: CPT | Mod: XB

## 2023-12-26 PROCEDURE — 37000008 HC ANESTHESIA 1ST 15 MINUTES: Performed by: UROLOGY

## 2023-12-26 PROCEDURE — 63600175 PHARM REV CODE 636 W HCPCS: Performed by: STUDENT IN AN ORGANIZED HEALTH CARE EDUCATION/TRAINING PROGRAM

## 2023-12-26 PROCEDURE — 36000707: Performed by: UROLOGY

## 2023-12-26 PROCEDURE — 25000003 PHARM REV CODE 250: Performed by: UROLOGY

## 2023-12-26 PROCEDURE — 85730 THROMBOPLASTIN TIME PARTIAL: CPT | Performed by: ANESTHESIOLOGY

## 2023-12-26 RX ORDER — HYDROCODONE BITARTRATE AND ACETAMINOPHEN 5; 325 MG/1; MG/1
1 TABLET ORAL EVERY 4 HOURS PRN
Status: DISCONTINUED | OUTPATIENT
Start: 2023-12-26 | End: 2023-12-27 | Stop reason: HOSPADM

## 2023-12-26 RX ORDER — METOCLOPRAMIDE HYDROCHLORIDE 5 MG/ML
10 INJECTION INTRAMUSCULAR; INTRAVENOUS EVERY 10 MIN PRN
Status: COMPLETED | OUTPATIENT
Start: 2023-12-26 | End: 2023-12-26

## 2023-12-26 RX ORDER — ACETAMINOPHEN 325 MG/1
650 TABLET ORAL EVERY 4 HOURS PRN
Status: DISCONTINUED | OUTPATIENT
Start: 2023-12-26 | End: 2023-12-27 | Stop reason: HOSPADM

## 2023-12-26 RX ORDER — ONDANSETRON 2 MG/ML
4 INJECTION INTRAMUSCULAR; INTRAVENOUS EVERY 12 HOURS PRN
Status: DISCONTINUED | OUTPATIENT
Start: 2023-12-26 | End: 2023-12-27 | Stop reason: HOSPADM

## 2023-12-26 RX ORDER — FENTANYL CITRATE 50 UG/ML
25 INJECTION, SOLUTION INTRAMUSCULAR; INTRAVENOUS EVERY 5 MIN PRN
Status: DISCONTINUED | OUTPATIENT
Start: 2023-12-26 | End: 2023-12-26 | Stop reason: HOSPADM

## 2023-12-26 RX ORDER — EPHEDRINE SULFATE 50 MG/ML
INJECTION, SOLUTION INTRAVENOUS
Status: DISCONTINUED | OUTPATIENT
Start: 2023-12-26 | End: 2023-12-26

## 2023-12-26 RX ORDER — CEFAZOLIN SODIUM 2 G/50ML
2 SOLUTION INTRAVENOUS
Status: COMPLETED | OUTPATIENT
Start: 2023-12-26 | End: 2023-12-26

## 2023-12-26 RX ORDER — PHENYLEPHRINE HYDROCHLORIDE 10 MG/ML
INJECTION INTRAVENOUS
Status: DISCONTINUED | OUTPATIENT
Start: 2023-12-26 | End: 2023-12-26

## 2023-12-26 RX ORDER — CEFAZOLIN SODIUM 2 G/50ML
2 SOLUTION INTRAVENOUS
Status: COMPLETED | OUTPATIENT
Start: 2023-12-26 | End: 2023-12-27

## 2023-12-26 RX ORDER — CARVEDILOL 6.25 MG/1
12.5 TABLET ORAL 2 TIMES DAILY
Status: DISCONTINUED | OUTPATIENT
Start: 2023-12-27 | End: 2023-12-27 | Stop reason: HOSPADM

## 2023-12-26 RX ORDER — FENTANYL CITRATE 50 UG/ML
INJECTION, SOLUTION INTRAMUSCULAR; INTRAVENOUS
Status: DISCONTINUED | OUTPATIENT
Start: 2023-12-26 | End: 2023-12-26

## 2023-12-26 RX ORDER — PROPOFOL 10 MG/ML
VIAL (ML) INTRAVENOUS
Status: DISCONTINUED | OUTPATIENT
Start: 2023-12-26 | End: 2023-12-26

## 2023-12-26 RX ORDER — FINASTERIDE 5 MG/1
5 TABLET, FILM COATED ORAL DAILY
Status: DISCONTINUED | OUTPATIENT
Start: 2023-12-26 | End: 2023-12-27 | Stop reason: HOSPADM

## 2023-12-26 RX ORDER — LIDOCAINE HYDROCHLORIDE 20 MG/ML
INJECTION INTRAVENOUS
Status: DISCONTINUED | OUTPATIENT
Start: 2023-12-26 | End: 2023-12-26

## 2023-12-26 RX ORDER — SODIUM CHLORIDE, SODIUM LACTATE, POTASSIUM CHLORIDE, CALCIUM CHLORIDE 600; 310; 30; 20 MG/100ML; MG/100ML; MG/100ML; MG/100ML
INJECTION, SOLUTION INTRAVENOUS CONTINUOUS
Status: DISCONTINUED | OUTPATIENT
Start: 2023-12-26 | End: 2023-12-27 | Stop reason: HOSPADM

## 2023-12-26 RX ORDER — OXYCODONE HYDROCHLORIDE 5 MG/1
5 TABLET ORAL
Status: DISCONTINUED | OUTPATIENT
Start: 2023-12-26 | End: 2023-12-26 | Stop reason: HOSPADM

## 2023-12-26 RX ORDER — DEXAMETHASONE SODIUM PHOSPHATE 4 MG/ML
INJECTION, SOLUTION INTRA-ARTICULAR; INTRALESIONAL; INTRAMUSCULAR; INTRAVENOUS; SOFT TISSUE
Status: DISCONTINUED | OUTPATIENT
Start: 2023-12-26 | End: 2023-12-26

## 2023-12-26 RX ORDER — AMLODIPINE BESYLATE 5 MG/1
10 TABLET ORAL NIGHTLY
Status: DISCONTINUED | OUTPATIENT
Start: 2023-12-27 | End: 2023-12-27 | Stop reason: HOSPADM

## 2023-12-26 RX ORDER — ACETAMINOPHEN 10 MG/ML
INJECTION, SOLUTION INTRAVENOUS
Status: DISCONTINUED | OUTPATIENT
Start: 2023-12-26 | End: 2023-12-26

## 2023-12-26 RX ORDER — ONDANSETRON 2 MG/ML
INJECTION INTRAMUSCULAR; INTRAVENOUS
Status: DISCONTINUED | OUTPATIENT
Start: 2023-12-26 | End: 2023-12-26

## 2023-12-26 RX ADMIN — DEXAMETHASONE SODIUM PHOSPHATE 4 MG: 4 INJECTION, SOLUTION INTRA-ARTICULAR; INTRALESIONAL; INTRAMUSCULAR; INTRAVENOUS; SOFT TISSUE at 07:12

## 2023-12-26 RX ADMIN — LIDOCAINE HYDROCHLORIDE 50 MG: 20 INJECTION, SOLUTION INTRAVENOUS at 07:12

## 2023-12-26 RX ADMIN — METOCLOPRAMIDE 10 MG: 5 INJECTION, SOLUTION INTRAMUSCULAR; INTRAVENOUS at 09:12

## 2023-12-26 RX ADMIN — FINASTERIDE 5 MG: 5 TABLET, FILM COATED ORAL at 03:12

## 2023-12-26 RX ADMIN — FENTANYL CITRATE 25 MCG: 50 INJECTION, SOLUTION INTRAMUSCULAR; INTRAVENOUS at 07:12

## 2023-12-26 RX ADMIN — GLYCOPYRROLATE 0.2 MG: 0.2 INJECTION, SOLUTION INTRAMUSCULAR; INTRAVITREAL at 07:12

## 2023-12-26 RX ADMIN — CEFAZOLIN SODIUM 2 G: 2 SOLUTION INTRAVENOUS at 02:12

## 2023-12-26 RX ADMIN — SODIUM CHLORIDE, SODIUM GLUCONATE, SODIUM ACETATE, POTASSIUM CHLORIDE AND MAGNESIUM CHLORIDE: 526; 502; 368; 37; 30 INJECTION, SOLUTION INTRAVENOUS at 07:12

## 2023-12-26 RX ADMIN — PROPOFOL 150 MG: 10 INJECTION, EMULSION INTRAVENOUS at 07:12

## 2023-12-26 RX ADMIN — CEFAZOLIN SODIUM 2 G: 2 SOLUTION INTRAVENOUS at 10:12

## 2023-12-26 RX ADMIN — PHENYLEPHRINE HYDROCHLORIDE 50 MCG: 10 INJECTION INTRAVENOUS at 07:12

## 2023-12-26 RX ADMIN — ONDANSETRON 4 MG: 2 INJECTION INTRAMUSCULAR; INTRAVENOUS at 07:12

## 2023-12-26 RX ADMIN — ACETAMINOPHEN 1000 MG: 10 INJECTION, SOLUTION INTRAVENOUS at 07:12

## 2023-12-26 RX ADMIN — SODIUM CHLORIDE, POTASSIUM CHLORIDE, SODIUM LACTATE AND CALCIUM CHLORIDE: 600; 310; 30; 20 INJECTION, SOLUTION INTRAVENOUS at 09:12

## 2023-12-26 RX ADMIN — FENTANYL CITRATE 25 MCG: 50 INJECTION INTRAMUSCULAR; INTRAVENOUS at 09:12

## 2023-12-26 RX ADMIN — CEFAZOLIN SODIUM 2 G: 2 SOLUTION INTRAVENOUS at 07:12

## 2023-12-26 RX ADMIN — SODIUM CHLORIDE, POTASSIUM CHLORIDE, SODIUM LACTATE AND CALCIUM CHLORIDE: 600; 310; 30; 20 INJECTION, SOLUTION INTRAVENOUS at 02:12

## 2023-12-26 RX ADMIN — OXYCODONE 5 MG: 5 TABLET ORAL at 09:12

## 2023-12-26 RX ADMIN — EPHEDRINE SULFATE 10 MG: 50 INJECTION, SOLUTION INTRAMUSCULAR; INTRAVENOUS; SUBCUTANEOUS at 07:12

## 2023-12-26 RX ADMIN — SODIUM CHLORIDE, POTASSIUM CHLORIDE, SODIUM LACTATE AND CALCIUM CHLORIDE: 600; 310; 30; 20 INJECTION, SOLUTION INTRAVENOUS at 06:12

## 2023-12-26 RX ADMIN — HYDROCODONE BITARTRATE AND ACETAMINOPHEN 1 TABLET: 5; 325 TABLET ORAL at 10:12

## 2023-12-26 RX ADMIN — SODIUM CHLORIDE, SODIUM GLUCONATE, SODIUM ACETATE, POTASSIUM CHLORIDE AND MAGNESIUM CHLORIDE: 526; 502; 368; 37; 30 INJECTION, SOLUTION INTRAVENOUS at 08:12

## 2023-12-26 NOTE — HPI
Patient is an 78-year-old man with a history of coronary artery disease, hypertension, primary appendiceal adenocarcinoma, BPH with obstruction who was seen today after TURP with Dr. Reza.  Patient reports feeling well at this time with no shortness a breath, chest pain, abdominal pain, nausea or vomiting.  Procedure today was done with indication of BPH with bladder outlet obstruction complicated by gross hematuria.  Patient is currently on CBI with plan to continue overnight per Urology.  Patient expressed understanding of plan.

## 2023-12-26 NOTE — ASSESSMENT & PLAN NOTE
Chronic, controlled. Latest blood pressure and vitals reviewed-     Temp:  [97.7 °F (36.5 °C)-98.2 °F (36.8 °C)]   Pulse:  [54-64]   Resp:  [9-27]   BP: (115-162)/(61-80)   SpO2:  [95 %-100 %] .   Home meds for hypertension were reviewed and noted below.   Hypertension Medications               amLODIPine (NORVASC) 10 MG tablet Take 1 tablet (10 mg total) by mouth every evening.    carvedilol (COREG) 12.5 MG tablet 12.5 mg 2 (two) times daily.     MICARDIS 40 mg Tab Take 40 mg by mouth every morning.     NITROSTAT 0.4 mg SL tablet Place 0.4 mg under the tongue every 5 (five) minutes as needed for Chest pain.             While in the hospital, will manage blood pressure as follows; Continue home antihypertensive regimen    Will utilize p.r.n. blood pressure medication only if patient's blood pressure greater than 180/110 and he develops symptoms such as worsening chest pain or shortness of breath.

## 2023-12-26 NOTE — TRANSFER OF CARE
"Anesthesia Transfer of Care Note    Patient: Ryan Stewart    Procedure(s) Performed: Procedure(s) (LRB):  TURP (TRANSURETHRAL RESECTION OF PROSTATE) (N/A)    Patient location: PACU    Anesthesia Type: general    Transport from OR: Transported from OR on 2-3 L/min O2 by NC with adequate spontaneous ventilation    Post pain: adequate analgesia    Post assessment: no apparent anesthetic complications    Post vital signs: stable    Level of consciousness: responds to stimulation and lethargic    Nausea/Vomiting: no nausea/vomiting    Complications: none    Transfer of care protocol was followed      Last vitals: Visit Vitals  BP (!) 162/80 (BP Location: Right arm, Patient Position: Lying)   Pulse 62   Temp 36.8 °C (98.2 °F) (Skin)   Resp 16   Ht 5' 6" (1.676 m)   Wt 99.8 kg (220 lb)   SpO2 98%   BMI 35.51 kg/m²     "

## 2023-12-26 NOTE — SUBJECTIVE & OBJECTIVE
Past Medical History:   Diagnosis Date    BPH with obstruction/lower urinary tract symptoms     Cancer     prostate    Carcinoma of appendix     Coronary artery disease     Elevated PSA     Gilbert's disease 09/28/2021    Hyperlipidemia     Hypertension     MI (myocardial infarction) 2005    Primary appendiceal adenocarcinoma     Stented coronary artery 2005    RCA       Past Surgical History:   Procedure Laterality Date    APPENDECTOMY      CARDIAC SURGERY      stent coronary    COLON SURGERY      CYSTOSCOPY      CYSTOSCOPY N/A 6/2/2021    Procedure: CYSTOSCOPY;  Surgeon: Craig Reza Jr., MD;  Location: [facility] OR;  Service: Urology;  Laterality: N/A;    CYSTOSCOPY N/A 12/13/2023    Procedure: CYSTOSCOPY;  Surgeon: Craig Reza Jr., MD;  Location: [facility] OR;  Service: Urology;  Laterality: N/A;    EAR EXAMINATION UNDER ANESTHESIA      LAPAROSCOPIC APPENDECTOMY N/A 5/6/2021    Procedure: APPENDECTOMY, LAPAROSCOPIC;  Surgeon: Sundar Garcia MD;  Location: [facility] OR;  Service: General;  Laterality: N/A;    LAPAROSCOPIC CHOLECYSTECTOMY N/A 10/10/2021    Procedure: CHOLECYSTECTOMY, LAPAROSCOPIC;  Surgeon: Eulogio Yusuf MD;  Location: [facility] OR;  Service: General;  Laterality: N/A;    SURGICAL REMOVAL OF ILEUM WITH CECUM  5/6/2021    Procedure: EXCISION, CECUM WITH ILEUM;  Surgeon: Sundar Garcia MD;  Location: [facility] OR;  Service: General;;    TRANSRECTAL ULTRASOUND EXAMINATION N/A 6/2/2021    Procedure: ULTRASOUND, RECTAL APPROACH;  Surgeon: Craig Reza Jr., MD;  Location: [facility] OR;  Service: Urology;  Laterality: N/A;    TRANSRECTAL ULTRASOUND EXAMINATION N/A 12/13/2023    Procedure: ULTRASOUND, RECTAL APPROACH;  Surgeon: Craig Reza Jr., MD;  Location: [facility] OR;  Service: Urology;  Laterality: N/A;    TRANSURETHRAL RESECTION OF PROSTATE N/A 6/22/2021    Procedure: TURP (TRANSURETHRAL RESECTION OF PROSTATE);  Surgeon: Craig Reza Jr., MD;  Location: [facility] OR;  Service: Urology;  Laterality: N/A;        Review of patient's allergies indicates:   Allergen Reactions    Ciprofloxacin      vomiting    Rapaflo [silodosin]      Urinating increased, kidney pain    Simvastatin Other (See Comments)     Severe muscle pain       No current facility-administered medications on file prior to encounter.     Current Outpatient Medications on File Prior to Encounter   Medication Sig    amLODIPine (NORVASC) 10 MG tablet Take 1 tablet (10 mg total) by mouth every evening.    carvedilol (COREG) 12.5 MG tablet 12.5 mg 2 (two) times daily.     CHOLECALCIFEROL, VITAMIN D3, ORAL Take 2,000 Units by mouth every morning.     finasteride (PROSCAR) 5 mg tablet Take 1 tablet (5 mg total) by mouth once daily.    lovastatin (MEVACOR) 40 MG tablet Take 40 mg by mouth nightly.     MICARDIS 40 mg Tab Take 40 mg by mouth every morning.     tamsulosin (FLOMAX) 0.4 mg Cap Take 1 capsule (0.4 mg total) by mouth once daily. Take at bedtime    tamsulosin (FLOMAX) 0.4 mg Cap Take 1 capsule (0.4 mg total) by mouth once daily.    albuterol (PROVENTIL/VENTOLIN HFA) 90 mcg/actuation inhaler Inhale 2 puffs into the lungs every 6 (six) hours as needed for Wheezing or Shortness of Breath. Rescue    aspirin (ECOTRIN) 81 MG EC tablet Take 1 tablet (81 mg total) by mouth every morning. Start on Sunday 6/28    capecitabine (XELODA) 500 MG Tab Takes 3 tablets by mouth 2 times daily for 2 weeks on 1 week off..    clopidogreL (PLAVIX) 75 mg tablet Take 1 tablet (75 mg total) by mouth every morning. Start on Sunday 6/28 (Patient not taking: Reported on 12/21/2023)    fish oil-omega-3 fatty acids 300-1,000 mg capsule Take 1 capsule by mouth 2 (two) times daily.     fluticasone (FLONASE) 50 mcg/actuation nasal spray 2 sprays by Each Nostril route daily as needed for Rhinitis.     HYDROcodone-acetaminophen (NORCO) 5-325 mg per tablet Take 1 tablet by mouth every 6 (six) hours as needed for Pain.    NITROSTAT 0.4 mg SL tablet Place 0.4 mg under the tongue every 5  (five) minutes as needed for Chest pain.      Family History    None       Tobacco Use    Smoking status: Former     Current packs/day: 0.00     Types: Cigarettes     Quit date: 1991     Years since quittin.5    Smokeless tobacco: Never    Tobacco comments:     cigars and pipe   Substance and Sexual Activity    Alcohol use: Not Currently    Drug use: Not Currently    Sexual activity: Not on file     Review of Systems   Constitutional:  Negative for chills and fever.   HENT:  Negative for congestion and rhinorrhea.    Respiratory:  Negative for cough, shortness of breath and wheezing.    Cardiovascular:  Negative for chest pain, palpitations and leg swelling.   Gastrointestinal:  Negative for abdominal distention, abdominal pain, nausea and vomiting.   Endocrine: Negative for cold intolerance and heat intolerance.   Genitourinary:  Positive for difficulty urinating and hematuria.   Musculoskeletal:  Negative for arthralgias and neck stiffness.   Skin:  Negative for rash and wound.   Neurological:  Negative for dizziness and weakness.     Objective:     Vital Signs (Most Recent):  Temp: 97.7 °F (36.5 °C) (23 1100)  Pulse: (!) 58 (23 1106)  Resp: 14 (23 1106)  BP: (!) 142/78 (23 1106)  SpO2: 100 % (23 1106) Vital Signs (24h Range):  Temp:  [97.7 °F (36.5 °C)-98.2 °F (36.8 °C)] 97.7 °F (36.5 °C)  Pulse:  [54-64] 58  Resp:  [9-27] 14  SpO2:  [95 %-100 %] 100 %  BP: (115-162)/(61-80) 142/78     Weight: 99.8 kg (220 lb)  Body mass index is 35.51 kg/m².     Physical Exam  Constitutional:       General: He is not in acute distress.     Appearance: He is well-developed.   HENT:      Head: Normocephalic and atraumatic.   Eyes:      Pupils: Pupils are equal, round, and reactive to light.   Cardiovascular:      Rate and Rhythm: Normal rate and regular rhythm.      Heart sounds: No murmur heard.  Pulmonary:      Effort: Pulmonary effort is normal. No respiratory distress.      Breath  sounds: Normal breath sounds. No wheezing or rales.   Abdominal:      General: Bowel sounds are normal. There is no distension.      Palpations: Abdomen is soft.      Tenderness: There is no abdominal tenderness.   Genitourinary:     Comments: Zazueta in place with CBI  Musculoskeletal:         General: Normal range of motion.   Skin:     General: Skin is warm and dry.      Findings: No rash.   Neurological:      Mental Status: He is alert and oriented to person, place, and time.      Cranial Nerves: No cranial nerve deficit.   Psychiatric:         Behavior: Behavior normal.              CRANIAL NERVES     CN III, IV, VI   Pupils are equal, round, and reactive to light.       Significant Labs: All pertinent labs within the past 24 hours have been reviewed.    Significant Imaging: I have reviewed all pertinent imaging results/findings within the past 24 hours.

## 2023-12-26 NOTE — H&P
[facility] Medicine  History & Physical    Patient Name: Ryan Stewart  MRN: [id number]  Patient Class: OP- Outpatient Recovery  Admission Date: 12/26/2023  Attending Physician: Simran Wilcox MD  Primary Care Provider: Erik Perry MD         Patient information was obtained from patient and past medical records.     Subjective:     Principal Problem:Benign prostatic hyperplasia with urinary retention    Chief Complaint: No chief complaint on file.       HPI: Patient is an 78-year-old man with a history of coronary artery disease, hypertension, primary appendiceal adenocarcinoma, BPH with obstruction who was seen today after TURP with Dr. Reza.  Patient reports feeling well at this time with no shortness a breath, chest pain, abdominal pain, nausea or vomiting.  Procedure today was done with indication of BPH with bladder outlet obstruction complicated by gross hematuria.  Patient is currently on CBI with plan to continue overnight per Urology.  Patient expressed understanding of plan.    Past Medical History:   Diagnosis Date    BPH with obstruction/lower urinary tract symptoms     Cancer     prostate    Carcinoma of appendix     Coronary artery disease     Elevated PSA     Gilbert's disease 09/28/2021    Hyperlipidemia     Hypertension     MI (myocardial infarction) 2005    Primary appendiceal adenocarcinoma     Stented coronary artery 2005    RCA       Past Surgical History:   Procedure Laterality Date    APPENDECTOMY      CARDIAC SURGERY      stent coronary    COLON SURGERY      CYSTOSCOPY      CYSTOSCOPY N/A 6/2/2021    Procedure: CYSTOSCOPY;  Surgeon: Craig Reza Jr., MD;  Location: [facility] OR;  Service: Urology;  Laterality: N/A;    CYSTOSCOPY N/A 12/13/2023    Procedure: CYSTOSCOPY;  Surgeon: Craig Reza Jr., MD;  Location: [facility] OR;  Service: Urology;  Laterality: N/A;    EAR EXAMINATION UNDER ANESTHESIA      LAPAROSCOPIC APPENDECTOMY N/A 5/6/2021     Procedure: APPENDECTOMY, LAPAROSCOPIC;  Surgeon: Sundar Garcia MD;  Location: [facility] OR;  Service: General;  Laterality: N/A;    LAPAROSCOPIC CHOLECYSTECTOMY N/A 10/10/2021    Procedure: CHOLECYSTECTOMY, LAPAROSCOPIC;  Surgeon: Eulogio Yusuf MD;  Location: [facility] OR;  Service: General;  Laterality: N/A;    SURGICAL REMOVAL OF ILEUM WITH CECUM  5/6/2021    Procedure: EXCISION, CECUM WITH ILEUM;  Surgeon: Sundar Garcia MD;  Location: [facility] OR;  Service: General;;    TRANSRECTAL ULTRASOUND EXAMINATION N/A 6/2/2021    Procedure: ULTRASOUND, RECTAL APPROACH;  Surgeon: Craig Reza Jr., MD;  [facility];  Service: Urology;  Laterality: N/A;    TRANSRECTAL ULTRASOUND EXAMINATION N/A 12/13/2023    Procedure: ULTRASOUND, RECTAL APPROACH;  Surgeon: Craig Reza Jr., MD;  Location: [facility] OR;  Service: Urology;  Laterality: N/A;    TRANSURETHRAL RESECTION OF PROSTATE N/A 6/22/2021    Procedure: TURP (TRANSURETHRAL RESECTION OF PROSTATE);  Surgeon: Craig Reza Jr., MD;  Location: [facility] OR;  Service: Urology;  Laterality: N/A;       Review of patient's allergies indicates:   Allergen Reactions    Ciprofloxacin      vomiting    Rapaflo [silodosin]      Urinating increased, kidney pain    Simvastatin Other (See Comments)     Severe muscle pain       No current facility-administered medications on file prior to encounter.     Current Outpatient Medications on File Prior to Encounter   Medication Sig    amLODIPine (NORVASC) 10 MG tablet Take 1 tablet (10 mg total) by mouth every evening.    carvedilol (COREG) 12.5 MG tablet 12.5 mg 2 (two) times daily.     CHOLECALCIFEROL, VITAMIN D3, ORAL Take 2,000 Units by mouth every morning.     finasteride (PROSCAR) 5 mg tablet Take 1 tablet (5 mg total) by mouth once daily.    lovastatin (MEVACOR) 40 MG tablet Take 40 mg by mouth nightly.     MICARDIS 40 mg Tab Take 40 mg by mouth every morning.     tamsulosin (FLOMAX) 0.4 mg Cap Take 1 capsule (0.4 mg total) by mouth once  daily. Take at bedtime    tamsulosin (FLOMAX) 0.4 mg Cap Take 1 capsule (0.4 mg total) by mouth once daily.    albuterol (PROVENTIL/VENTOLIN HFA) 90 mcg/actuation inhaler Inhale 2 puffs into the lungs every 6 (six) hours as needed for Wheezing or Shortness of Breath. Rescue    aspirin (ECOTRIN) 81 MG EC tablet Take 1 tablet (81 mg total) by mouth every morning. Start on     capecitabine (XELODA) 500 MG Tab Takes 3 tablets by mouth 2 times daily for 2 weeks on 1 week off..    clopidogreL (PLAVIX) 75 mg tablet Take 1 tablet (75 mg total) by mouth every morning. Start on  (Patient not taking: Reported on 2023)    fish oil-omega-3 fatty acids 300-1,000 mg capsule Take 1 capsule by mouth 2 (two) times daily.     fluticasone (FLONASE) 50 mcg/actuation nasal spray 2 sprays by Each Nostril route daily as needed for Rhinitis.     HYDROcodone-acetaminophen (NORCO) 5-325 mg per tablet Take 1 tablet by mouth every 6 (six) hours as needed for Pain.    NITROSTAT 0.4 mg SL tablet Place 0.4 mg under the tongue every 5 (five) minutes as needed for Chest pain.      Family History    None       Tobacco Use    Smoking status: Former     Current packs/day: 0.00     Types: Cigarettes     Quit date: 1991     Years since quittin.5    Smokeless tobacco: Never    Tobacco comments:     cigars and pipe   Substance and Sexual Activity    Alcohol use: Not Currently    Drug use: Not Currently    Sexual activity: Not on file     Review of Systems   Constitutional:  Negative for chills and fever.   HENT:  Negative for congestion and rhinorrhea.    Respiratory:  Negative for cough, shortness of breath and wheezing.    Cardiovascular:  Negative for chest pain, palpitations and leg swelling.   Gastrointestinal:  Negative for abdominal distention, abdominal pain, nausea and vomiting.   Endocrine: Negative for cold intolerance and heat intolerance.   Genitourinary:  Positive for difficulty urinating and hematuria.    Musculoskeletal:  Negative for arthralgias and neck stiffness.   Skin:  Negative for rash and wound.   Neurological:  Negative for dizziness and weakness.     Objective:     Vital Signs (Most Recent):  Temp: 97.7 °F (36.5 °C) (12/26/23 1100)  Pulse: (!) 58 (12/26/23 1106)  Resp: 14 (12/26/23 1106)  BP: (!) 142/78 (12/26/23 1106)  SpO2: 100 % (12/26/23 1106) Vital Signs (24h Range):  Temp:  [97.7 °F (36.5 °C)-98.2 °F (36.8 °C)] 97.7 °F (36.5 °C)  Pulse:  [54-64] 58  Resp:  [9-27] 14  SpO2:  [95 %-100 %] 100 %  BP: (115-162)/(61-80) 142/78     Weight: 99.8 kg (220 lb)  Body mass index is 35.51 kg/m².     Physical Exam  Constitutional:       General: He is not in acute distress.     Appearance: He is well-developed.   HENT:      Head: Normocephalic and atraumatic.   Eyes:      Pupils: Pupils are equal, round, and reactive to light.   Cardiovascular:      Rate and Rhythm: Normal rate and regular rhythm.      Heart sounds: No murmur heard.  Pulmonary:      Effort: Pulmonary effort is normal. No respiratory distress.      Breath sounds: Normal breath sounds. No wheezing or rales.   Abdominal:      General: Bowel sounds are normal. There is no distension.      Palpations: Abdomen is soft.      Tenderness: There is no abdominal tenderness.   Genitourinary:     Comments: Zazueta in place with CBI  Musculoskeletal:         General: Normal range of motion.   Skin:     General: Skin is warm and dry.      Findings: No rash.   Neurological:      Mental Status: He is alert and oriented to person, place, and time.      Cranial Nerves: No cranial nerve deficit.   Psychiatric:         Behavior: Behavior normal.              CRANIAL NERVES     CN III, IV, VI   Pupils are equal, round, and reactive to light.       Significant Labs: All pertinent labs within the past 24 hours have been reviewed.    Significant Imaging: I have reviewed all pertinent imaging results/findings within the past 24 hours.  Assessment/Plan:     * Benign  prostatic hyperplasia with urinary retention  Status post TURP with Dr. Reza 12/26  Continuous bladder irrigation per Urology  Pain control      Mixed hyperlipidemia  Chronic/controlled.  Continue lovastatin on discharge      Hypertension  Chronic, controlled. Latest blood pressure and vitals reviewed-     Temp:  [97.7 °F (36.5 °C)-98.2 °F (36.8 °C)]   Pulse:  [54-64]   Resp:  [9-27]   BP: (115-162)/(61-80)   SpO2:  [95 %-100 %] .   Home meds for hypertension were reviewed and noted below.   Hypertension Medications               amLODIPine (NORVASC) 10 MG tablet Take 1 tablet (10 mg total) by mouth every evening.    carvedilol (COREG) 12.5 MG tablet 12.5 mg 2 (two) times daily.     MICARDIS 40 mg Tab Take 40 mg by mouth every morning.     NITROSTAT 0.4 mg SL tablet Place 0.4 mg under the tongue every 5 (five) minutes as needed for Chest pain.             While in the hospital, will manage blood pressure as follows; Continue home antihypertensive regimen    Will utilize p.r.n. blood pressure medication only if patient's blood pressure greater than 180/110 and he develops symptoms such as worsening chest pain or shortness of breath.      VTE Risk Mitigation (From admission, onward)           Ordered     IP VTE LOW RISK PATIENT  Once         12/26/23 0539                                    Simran Wilcox MD  Department of Hospital Medicine  [facility]

## 2023-12-26 NOTE — ANESTHESIA PREPROCEDURE EVALUATION
12/26/2023  Ryan Stewart is a 78 y.o., male.      Pre-op Assessment    I have reviewed the Patient Summary Reports.     I have reviewed the Nursing Notes. I have reviewed the NPO Status.   I have reviewed the Medications.     Review of Systems  Anesthesia Hx:             Denies Family Hx of Anesthesia complications.    Denies Personal Hx of Anesthesia complications.                    Hematology/Oncology:       -- Anemia:                    --  Cancer in past history:              chemotherapy and surgery   Oncology Comments: CA appendix     Cardiovascular:     Hypertension   CAD  asymptomatic CABG/stent    Denies Angina.     hyperlipidemia   ECG has been reviewed. Nely 1 DD                         Renal/:    BPH              Hepatic/GI:        Gilbert's Ds          Endocrine:        Obesity / BMI > 30      Physical Exam  General: Cooperative, Alert and Oriented    Airway:  Mallampati: III / II  Mouth Opening: Normal  TM Distance: Normal  Tongue: Normal  Neck ROM: Extension Decreased  Neck: Girth Increased    Dental:  Several missing teeth      Anesthesia Plan  Type of Anesthesia, risks & benefits discussed:    Anesthesia Type: Gen ETT, Gen Supraglottic Airway  Intra-op Monitoring Plan: Standard ASA Monitors  Post Op Pain Control Plan: multimodal analgesia  Induction:  IV  Airway Plan: , Post-Induction  Informed Consent: Informed consent signed with the Patient and all parties understand the risks and agree with anesthesia plan.  All questions answered.   ASA Score: 3    Ready For Surgery From Anesthesia Perspective.     .

## 2023-12-26 NOTE — ANESTHESIA POSTPROCEDURE EVALUATION
Anesthesia Post Evaluation    Patient: Ryan Stewart    Procedure(s) Performed: Procedure(s) (LRB):  TURP (TRANSURETHRAL RESECTION OF PROSTATE) (N/A)    Final Anesthesia Type: general      Patient location during evaluation: PACU  Patient participation: Yes- Able to Participate  Level of consciousness: awake and alert  Post-procedure vital signs: reviewed and stable  Pain management: adequate  Airway patency: patent    PONV status at discharge: No PONV  Anesthetic complications: no      Cardiovascular status: blood pressure returned to baseline  Respiratory status: unassisted  Hydration status: euvolemic  Follow-up not needed.              Vitals Value Taken Time   /78 12/26/23 1106   Temp 36.5 °C (97.7 °F) 12/26/23 1100   Pulse 58 12/26/23 1106   Resp 14 12/26/23 1106   SpO2 100 % 12/26/23 1106         Event Time   Out of Recovery 11:06:00         Pain/Morgan Score: Pain Rating Prior to Med Admin: 3 (12/26/2023 10:55 AM)  Pain Rating Post Med Admin: 5 (12/26/2023  9:25 AM)  Morgan Score: 9 (12/26/2023 10:55 AM)

## 2023-12-26 NOTE — OP NOTE
[facility]  Operative Note    Date: 12/26/2023    Pre-Op Diagnosis: BPH with bladder outlet obstruction    Post-Op Diagnosis: same    Procedure(s) Performed:   TURP, Bipolar    Specimen(s): Prostate chips    Staff Surgeon: Craig Reza Jr, MD    Anesthesia: General endotracheal anesthesia    Indications: Ryan Stewart is a 78 y.o. male with an obstructing prostate complicated by gross hematuria.     Findings: Uncomplicated transurethral resection of prostate. UO's in normal anatomic position effluxing clear urine. 24 Iranian alexis placed and connected to continuous bladder irrigation.    Estimated Blood Loss: Minimal    Drains: 24 Fr 3-way alexis catheter    Procedure in detail: After the risks and benefits of the procedure were explained, consent was obtained, and the patient was taken to the operating suite and placed in the supine position.  General anesthesia was administered.  When adequately sedated, the patient was placed in dorsal lithotomy position and prepped and draped in normal sterile fashion.  Timeout was performed and preoperative ABx were confirmed.       Urethral dilation performed using urethral sounds sequentially from 18fr to 28fr from as the meatus was open but not large enough to allow passage of the 27 fr scope.      A 27-Iranian sheath resectoscope was placed into the bladder using a visual obturator. The visual obturator was exchanged for the resecting mechanism. The ureteral orifices were identified. The median lobe was first resected with care to avoid the ureteral orifices. Once the median lobe was resected, I then turned my attention to the left lateral lobe of the prostate.The left lateral lobe was then resected in a stepwise fashion from 7 o'clock to 12 o'clock with care to leave the verumontanum and sphincter intact. Once this was performed I then directed my attention to the right lobe of the prostate and again the lateral lobe was resected from the 5 o'clock  to the 12 o'clock position. Hemostasis was then achieved.     The ureteral orifices, verumontanum and sphincter were intact. The [facility] evacuator was used to remove all prostate chips and again hemostasis was obtained using the button.      Once the bladder was drained, I could see that he had an open channel and the scope was removed.  A 24 Fr 3-way alexis catheter was placed in the bladder with 30 mL of water in the balloon. I then irrigated with normal saline to clear. This irrigated easily and quickly. The patient was placed on traction and on CBI. The patient was transferred to recovery in stable condition.       Disposition:   To floor with hospital medicine for overnight observation on CBI.    Craig Reza Jr, MD

## 2023-12-26 NOTE — NURSING
Nurses Note -- 4 Eyes      12/26/2023   1:58 PM      Skin assessed during: Transfer      [] No Altered Skin Integrity Present    []Prevention Measures Documented      [x] Yes- Altered Skin Integrity Present or Discovered   [] LDA Added if Not in Epic (Describe Wound)   [] New Altered Skin Integrity was Present on Admit and Documented in LDA   [] Wound Image Taken    Wound Care Consulted? No    Attending Nurse:  Krsisy Eid RN/Staff Member:   Kannan

## 2023-12-26 NOTE — PLAN OF CARE
Patient prepared for surgery. Surgical and anesthesia consents signed. Patient belongings in preop. Text message notifications set up with son. Call light within reach, bed in lowest position.

## 2023-12-26 NOTE — ANESTHESIA PROCEDURE NOTES
Intubation    Date/Time: 12/26/2023 7:10 AM    Performed by: Rochelle Suarez CRNA  Authorized by: Johnson Duckworth MD    Intubation:     Induction:  Intravenous    Mask Ventilation:  Not attempted    Attempts:  1    Attempted By:  CRNA    Difficult Airway Encountered?: No      Complications:  None    Airway Device:  Supraglottic airway/LMA    Airway Device Size:  4.0    Secured at:  The lips    Placement Verified By:  Capnometry    Complicating Factors:  None    Findings Post-Intubation:  BS equal bilateral and atraumatic/condition of teeth unchanged

## 2023-12-26 NOTE — PLAN OF CARE
[facility] - Med/Surg  Initial Discharge Assessment       Primary Care Provider: Erik Perry MD    Admission Diagnosis: Benign prostatic hyperplasia, unspecified whether lower urinary tract symptoms present [N40.0]  BPH with obstruction/lower urinary tract symptoms [N40.1, N13.8]  BPH (benign prostatic hyperplasia) [N40.0]    Admission Date: 12/26/2023  Expected Discharge Date:     Transition of Care Barriers: None    Payor: MEDICARE / Plan: MEDICARE PART A & B / Product Type: Government /     Extended Emergency Contact Information  Primary Emergency Contact: Dora Stewart  Address: [address] [place] [address] [facility]  Home Phone: [phone]  Relation: Spouse  Preferred language: English   needed? No  Secondary Emergency Contact: CONTACT, NO OTHER  Relation: None    Discharge Plan A: Home  Discharge Plan B: Home with family      [id number] - [facility] [address] & [address]  [address] [address]  Phone: [phone] Fax: [phone]    SW met with patient at bedside to complete discharge planning assessment.  Patient alert and oriented xs 4.  Patient verified all demographic information on facesheet is correct.  Patient verified PCP is Dr Perry.  Patient verified primary health insurance is Medicare and secondary is [facility].  Patient with NO home health or DME.  Patient family will provide transportation upon discharge from facility.  Patient independent with ADLs, live with spouse, drives self.      Initial Assessment (most recent)       Adult Discharge Assessment - 12/26/23 1523          Discharge Assessment    Assessment Type Discharge Planning Assessment     Confirmed/corrected address, phone number and insurance Yes     Confirmed Demographics Correct on Facesheet     Source of Information patient     Does patient/caregiver understand observation status Yes     Communicated IBAN with  patient/caregiver Yes     People in Home spouse     Facility Arrived From: home     Do you expect to return to your current living situation? Yes     Do you have help at home or someone to help you manage your care at home? Yes     Who are your caregiver(s) and their phone number(s)? spouse     Prior to hospitilization cognitive status: Alert/Oriented     Current cognitive status: Alert/Oriented     Walking or Climbing Stairs Difficulty no     Walking or Climbing Stairs --     Dressing/Bathing Difficulty no     Dressing/Bathing --     Equipment Currently Used at Home none     Readmission within 30 days? No     Patient currently being followed by outpatient case management? No     Do you currently have service(s) that help you manage your care at home? No     Do you take prescription medications? Yes     Do you have prescription coverage? Yes     Do you have any problems affording any of your prescribed medications? No     Is the patient taking medications as prescribed? yes     Who is going to help you get home at discharge? spouse     How do you get to doctors appointments? car, drives self     Are you on dialysis? No     Do you take coumadin? No     Discharge Plan A Home     Discharge Plan B Home with family     DME Needed Upon Discharge  none     Discharge Plan discussed with: Patient;Spouse/sig other     Transition of Care Barriers None

## 2023-12-26 NOTE — PLAN OF CARE
Problem: Adult Inpatient Plan of Care  Goal: Plan of Care Review  Outcome: Ongoing, Progressing  Goal: Patient-Specific Goal (Individualized)  Outcome: Ongoing, Progressing  Goal: Absence of Hospital-Acquired Illness or Injury  Outcome: Ongoing, Progressing  Goal: Optimal Comfort and Wellbeing  Outcome: Ongoing, Progressing  Goal: Readiness for Transition of Care  Outcome: Ongoing, Progressing     Problem: Impaired Wound Healing  Goal: Optimal Wound Healing  Outcome: Ongoing, Progressing     Problem: Infection  Goal: Absence of Infection Signs and Symptoms  Outcome: Ongoing, Progressing     Problem: Fall Injury Risk  Goal: Absence of Fall and Fall-Related Injury  Outcome: Ongoing, Progressing     Problem: Skin Injury Risk Increased  Goal: Skin Health and Integrity  Outcome: Ongoing, Progressing

## 2023-12-26 NOTE — PLAN OF CARE
Report to Kenny. Patient denies pain, no nausea, urine with slight pink tinged. Awaiting transfer to floor when room available

## 2023-12-27 ENCOUNTER — TELEPHONE (OUTPATIENT)
Dept: UROLOGY | Facility: CLINIC | Age: 78
End: 2023-12-27
Payer: MEDICARE

## 2023-12-27 LAB
ALBUMIN SERPL BCP-MCNC: 3.4 G/DL (ref 3.5–5.2)
ALP SERPL-CCNC: 59 U/L (ref 55–135)
ALT SERPL W/O P-5'-P-CCNC: 14 U/L (ref 10–44)
ANION GAP SERPL CALC-SCNC: 14 MMOL/L (ref 8–16)
AST SERPL-CCNC: 14 U/L (ref 10–40)
BASOPHILS # BLD AUTO: 0.05 K/UL (ref 0–0.2)
BASOPHILS NFR BLD: 0.3 % (ref 0–1.9)
BILIRUB SERPL-MCNC: 1.5 MG/DL (ref 0.1–1)
BUN SERPL-MCNC: 16 MG/DL (ref 8–23)
CALCIUM SERPL-MCNC: 8.4 MG/DL (ref 8.7–10.5)
CHLORIDE SERPL-SCNC: 102 MMOL/L (ref 95–110)
CO2 SERPL-SCNC: 22 MMOL/L (ref 23–29)
CREAT SERPL-MCNC: 1 MG/DL (ref 0.5–1.4)
DIFFERENTIAL METHOD: ABNORMAL
EOSINOPHIL # BLD AUTO: 0 K/UL (ref 0–0.5)
EOSINOPHIL NFR BLD: 0 % (ref 0–8)
ERYTHROCYTE [DISTWIDTH] IN BLOOD BY AUTOMATED COUNT: 13.2 % (ref 11.5–14.5)
EST. GFR  (NO RACE VARIABLE): >60 ML/MIN/1.73 M^2
GLUCOSE SERPL-MCNC: 187 MG/DL (ref 70–110)
HCT VFR BLD AUTO: 37.3 % (ref 40–54)
HGB BLD-MCNC: 12.4 G/DL (ref 14–18)
IMM GRANULOCYTES # BLD AUTO: 0.07 K/UL (ref 0–0.04)
IMM GRANULOCYTES NFR BLD AUTO: 0.5 % (ref 0–0.5)
LYMPHOCYTES # BLD AUTO: 1.4 K/UL (ref 1–4.8)
LYMPHOCYTES NFR BLD: 8.9 % (ref 18–48)
MCH RBC QN AUTO: 30.2 PG (ref 27–31)
MCHC RBC AUTO-ENTMCNC: 33.2 G/DL (ref 32–36)
MCV RBC AUTO: 91 FL (ref 82–98)
MONOCYTES # BLD AUTO: 0.7 K/UL (ref 0.3–1)
MONOCYTES NFR BLD: 4.5 % (ref 4–15)
NEUTROPHILS # BLD AUTO: 13.3 K/UL (ref 1.8–7.7)
NEUTROPHILS NFR BLD: 85.8 % (ref 38–73)
NRBC BLD-RTO: 0 /100 WBC
PLATELET # BLD AUTO: 236 K/UL (ref 150–450)
PMV BLD AUTO: 10.1 FL (ref 9.2–12.9)
POTASSIUM SERPL-SCNC: 3.9 MMOL/L (ref 3.5–5.1)
PROT SERPL-MCNC: 6.3 G/DL (ref 6–8.4)
RBC # BLD AUTO: 4.11 M/UL (ref 4.6–6.2)
SODIUM SERPL-SCNC: 138 MMOL/L (ref 136–145)
WBC # BLD AUTO: 15.44 K/UL (ref 3.9–12.7)

## 2023-12-27 PROCEDURE — 63600175 PHARM REV CODE 636 W HCPCS: Performed by: UROLOGY

## 2023-12-27 PROCEDURE — 94760 N-INVAS EAR/PLS OXIMETRY 1: CPT

## 2023-12-27 PROCEDURE — 85025 COMPLETE CBC W/AUTO DIFF WBC: CPT | Performed by: HOSPITALIST

## 2023-12-27 PROCEDURE — 94799 UNLISTED PULMONARY SVC/PX: CPT | Mod: XB

## 2023-12-27 PROCEDURE — 36415 COLL VENOUS BLD VENIPUNCTURE: CPT | Performed by: HOSPITALIST

## 2023-12-27 PROCEDURE — 25000003 PHARM REV CODE 250: Performed by: UROLOGY

## 2023-12-27 PROCEDURE — 80053 COMPREHEN METABOLIC PANEL: CPT | Performed by: HOSPITALIST

## 2023-12-27 RX ORDER — HYDROCODONE BITARTRATE AND ACETAMINOPHEN 5; 325 MG/1; MG/1
1 TABLET ORAL EVERY 4 HOURS PRN
Qty: 16 TABLET | Refills: 0 | Status: SHIPPED | OUTPATIENT
Start: 2023-12-27

## 2023-12-27 RX ORDER — SULFAMETHOXAZOLE AND TRIMETHOPRIM 800; 160 MG/1; MG/1
1 TABLET ORAL 2 TIMES DAILY
Qty: 14 TABLET | Refills: 0 | Status: SHIPPED | OUTPATIENT
Start: 2023-12-27 | End: 2024-01-03

## 2023-12-27 RX ADMIN — SODIUM CHLORIDE, POTASSIUM CHLORIDE, SODIUM LACTATE AND CALCIUM CHLORIDE: 600; 310; 30; 20 INJECTION, SOLUTION INTRAVENOUS at 04:12

## 2023-12-27 RX ADMIN — CARVEDILOL 12.5 MG: 6.25 TABLET, FILM COATED ORAL at 08:12

## 2023-12-27 RX ADMIN — FINASTERIDE 5 MG: 5 TABLET, FILM COATED ORAL at 08:12

## 2023-12-27 RX ADMIN — CEFAZOLIN SODIUM 2 G: 2 SOLUTION INTRAVENOUS at 06:12

## 2023-12-27 NOTE — PLAN OF CARE
Pt appears to be resting, eyes are closed, breaths are deep and even. VSS, NAD noted at this time. Discussed PoC with pt, stated they understood and would help as able. No c/o pain, will continue to monitor for duration of shift.

## 2023-12-27 NOTE — DISCHARGE SUMMARY
Critical access hospital Medicine  Discharge Summary      Patient Name: Ryan Stewart  MRN: [id number]  ALONDRA: [id number]  Patient Class: OP- Outpatient Recovery  Admission Date: 12/26/2023  Hospital Length of Stay: 0 days  Discharge Date and Time: 12/27/2023 11:55 AM  Attending Physician: No att. providers found   Discharging Provider: Simran Wilcox MD  Primary Care Provider: Erik Perry MD    Primary Care Team: Networked reference to record PCT     HPI:   Patient is an 78-year-old man with a history of coronary artery disease, hypertension, primary appendiceal adenocarcinoma, BPH with obstruction who was seen today after TURP with Dr. Reza.  Patient reports feeling well at this time with no shortness a breath, chest pain, abdominal pain, nausea or vomiting.  Procedure today was done with indication of BPH with bladder outlet obstruction complicated by gross hematuria.  Patient is currently on CBI with plan to continue overnight per Urology.  Patient expressed understanding of plan.    Procedure(s) (LRB):  TURP (TRANSURETHRAL RESECTION OF PROSTATE) (N/A)      Hospital Course:   Patient was observed by the hospital medicine service after TURP with Dr. Reza on 12/26/2023.  Was placed on continuous bladder irrigation per Urology and observed overnight.  He was cleared for discharge the next day per Dr. Reza.  He was discharged with catheter in place.  Sent home with Bactrim for 1 week.  He will return to the office on 01/02/2024 for nurse visit Zazueta removal.  And he will return in 6 weeks for postop appointment with symptoms score and PVR.  He expressed understanding of discharge plan.  Return precautions were discussed.     Goals of Care Treatment Preferences:  Code Status: Full Code      Consults:     No new Assessment & Plan notes have been filed under this hospital service since the last note was generated.  Service: Hospital Medicine    Final Active Diagnoses:    Diagnosis Date Noted  POA    PRINCIPAL PROBLEM:  Benign prostatic hyperplasia with urinary retention [N40.1, R33.8] 06/22/2021 Yes    Hypertension [I10] 01/01/1975 Yes     Chronic    Mixed hyperlipidemia [E78.2] 1943 Yes     Chronic      Problems Resolved During this Admission:       Discharged Condition: good    Disposition: Home or Self Care    Follow Up:   Follow-up Information       Craig Reza Jr., MD Follow up.    Specialty: Urology  Why: - Return on 1/2/24 for nurse visit alexis removal.   - Return in 6 weeks for post-op appt with symptom score and PVR.  Contact information:  [address] [place] [address] [address]  [place] [address]  [phone]                           Patient Instructions:      Notify your health care provider if you experience any of the following:  temperature >100.4     Notify your health care provider if you experience any of the following:  severe uncontrolled pain     Notify your health care provider if you experience any of the following:  persistent dizziness, light-headedness, or visual disturbances     Notify your health care provider if you experience any of the following:  increased confusion or weakness     Activity as tolerated       Significant Diagnostic Studies: Labs: BMP:   Recent Labs   Lab 12/27/23  0839   *      K 3.9      CO2 22*   BUN 16   CREATININE 1.0   CALCIUM 8.4*    and CBC   Recent Labs   Lab 12/27/23  0839   WBC 15.44*   HGB 12.4*   HCT 37.3*          Pending Diagnostic Studies:       None           Medications:  Reconciled Home Medications:      Medication List        START taking these medications      sulfamethoxazole-trimethoprim 800-160mg 800-160 mg Tab  Commonly known as: BACTRIM DS  Take 1 tablet by mouth 2 (two) times daily. for 7 days            CHANGE how you take these medications      HYDROcodone-acetaminophen 5-325 mg per tablet  Commonly known as: NORCO  Take 1 tablet by mouth every 4 (four) hours as needed for Pain.  What changed: when  to take this            CONTINUE taking these medications      albuterol 90 mcg/actuation inhaler  Commonly known as: PROVENTIL/VENTOLIN HFA  Inhale 2 puffs into the lungs every 6 (six) hours as needed for Wheezing or Shortness of Breath. Rescue     amLODIPine 10 MG tablet  Commonly known as: NORVASC  Take 1 tablet (10 mg total) by mouth every evening.     aspirin 81 MG EC tablet  Commonly known as: ECOTRIN  Take 1 tablet (81 mg total) by mouth every morning. Start on Sunday 6/28     capecitabine 500 MG Tab  Commonly known as: XELODA  Takes 3 tablets by mouth 2 times daily for 2 weeks on 1 week off..     carvediloL 12.5 MG tablet  Commonly known as: COREG  12.5 mg 2 (two) times daily.     CHOLECALCIFEROL (VITAMIN D3) ORAL  Take 2,000 Units by mouth every morning.     clopidogreL 75 mg tablet  Commonly known as: PLAVIX  Take 1 tablet (75 mg total) by mouth every morning. Start on Sunday 6/28     finasteride 5 mg tablet  Commonly known as: PROSCAR  Take 1 tablet (5 mg total) by mouth once daily.     fish oil-omega-3 fatty acids 300-1,000 mg capsule  Take 1 capsule by mouth 2 (two) times daily.     fluticasone propionate 50 mcg/actuation nasal spray  Commonly known as: FLONASE  2 sprays by Each Nostril route daily as needed for Rhinitis.     lovastatin 40 MG tablet  Commonly known as: MEVACOR  Take 40 mg by mouth nightly.     MICARDIS 40 MG Tab  Generic drug: telmisartan  Take 40 mg by mouth every morning.     NITROSTAT 0.4 MG SL tablet  Generic drug: nitroGLYCERIN  Place 0.4 mg under the tongue every 5 (five) minutes as needed for Chest pain.     * tamsulosin 0.4 mg Cap  Commonly known as: FLOMAX  Take 1 capsule (0.4 mg total) by mouth once daily. Take at bedtime     * tamsulosin 0.4 mg Cap  Commonly known as: FLOMAX  Take 1 capsule (0.4 mg total) by mouth once daily.           * This list has 2 medication(s) that are the same as other medications prescribed for you. Read the directions carefully, and ask your doctor  or other care provider to review them with you.                  Indwelling Lines/Drains at time of discharge:   Lines/Drains/Airways       Drain  Duration                  Urethral Catheter 12/26/23 0808 Latex;Straight-tip 24 Fr. 1 day                    Time spent on the discharge of patient: 35 minutes         Simran Wilcox MD  Department of Hospital Medicine  [facility]/Surg

## 2023-12-27 NOTE — RESPIRATORY THERAPY
12/27/23 0915   Patient Assessment/Suction   Level of Consciousness (AVPU) alert   Respiratory Effort Normal;Unlabored   Expansion/Accessory Muscles/Retractions expansion symmetric;no retractions;no use of accessory muscles   Rhythm/Pattern, Respiratory depth regular;pattern regular;unlabored;no shortness of breath reported   Cough Frequency no cough   PRE-TX-O2   Device (Oxygen Therapy) room air   SpO2 96 %   Pulse Oximetry Type Intermittent   $ Pulse Oximetry - Single Charge Pulse Oximetry - Single   Pulse 80   Resp 18   Positioning Sitting on edge of bed (dangling)   Incentive Spirometer   $ Incentive Spirometer Charges done with encouragement   Incentive Spirometer Predicted Level (mL) 4000   Administration (IS) self-administered;proper technique demonstrated   Number of Repetitions (IS) 5   Level Incentive Spirometer (mL) 2500   Patient Tolerance (IS) good;no adverse signs/symptoms present

## 2023-12-27 NOTE — PROGRESS NOTES
Patient POD 1 s/p uncomplicated TURP.     No acute events overnight.     24 [place] 3-way alexis draining light pink urine off CBI.       Plan  - Ok to discharge with catheter in place if urine remains clear to pink.   - Home with Bactrim x 1 week.   - Return on 1/2/24 for nurse visit aelxis removal.   - Return in 6 weeks for post-op appt with symptom score and PVR.

## 2023-12-27 NOTE — PLAN OF CARE
Pt has a follow up appt with Dr. Reza nurse to return on 1/2/24 for nurse visit alexis removal. - Return in 6 weeks for post-op appt with symptom score and PVR. AVS updated    12/27/23 4779   Discharge Assessment   Assessment Type Discharge Planning Reassessment

## 2023-12-27 NOTE — NURSING
Discharge instructions given, patient verbalized understanding.  PIV removed with catheter intact. Patient educated on how to empty alexis. Patient demonstrated emptying alexis. Pt escorted to lobby.

## 2023-12-27 NOTE — TELEPHONE ENCOUNTER
----- Message from Gene Shah sent at 12/27/2023 12:48 PM CST -----  Contact: self  Type: Sooner Appointment Request        Caller is requesting a sooner appointment. Caller declined first available appointment listed below. Caller will not accept being placed on the waitlist and is requesting a message be sent to doctor.        Name of Caller: Patient   Best Call Back Number: Click to dial [phone]  Additional Information: Pt calling the office back said he missed two calls plz call pt back again. Thanks

## 2023-12-27 NOTE — PLAN OF CARE
The pt is cleared for discharge home once seen by Dr. Wilcox and has follow up appts added to his avs   12/27/23 9179   Final Note   Assessment Type Final Discharge Note   Anticipated Discharge Disposition Home   What phone number can be called within the next 1-3 days to see how you are doing after discharge? [phone]   Hospital Resources/Appts/Education Provided Appointments scheduled and added to AVS   Post-Acute Status   Discharge Delays None known at this time

## 2023-12-27 NOTE — HOSPITAL COURSE
Patient was observed by the hospital medicine service after TURP with Dr. Reza on 12/26/2023.  Was placed on continuous bladder irrigation per Urology and observed overnight.  He was cleared for discharge the next day per Dr. Reza.  He was discharged with catheter in place.  Sent home with Bactrim for 1 week.  He will return to the office on 01/02/2024 for nurse visit Zazueta removal.  And he will return in 6 weeks for postop appointment with symptoms score and PVR.  He expressed understanding of discharge plan.  Return precautions were discussed.

## 2023-12-29 VITALS
OXYGEN SATURATION: 98 % | HEIGHT: 66 IN | WEIGHT: 223.75 LBS | HEART RATE: 79 BPM | BODY MASS INDEX: 35.96 KG/M2 | RESPIRATION RATE: 18 BRPM | TEMPERATURE: 98 F | SYSTOLIC BLOOD PRESSURE: 149 MMHG | DIASTOLIC BLOOD PRESSURE: 76 MMHG

## 2023-12-29 NOTE — PROGRESS NOTES
12/29/23 Very pleased w/ care given. States all staff were great,  /states he has read and understands all discharge instructions.  Has some blood in urine, no clots. Instructed to drink lots more water. Apply KY or Vaseline to tip of penis for discomfort.

## 2024-01-02 ENCOUNTER — CLINICAL SUPPORT (OUTPATIENT)
Dept: UROLOGY | Facility: CLINIC | Age: 79
End: 2024-01-02
Payer: MEDICARE

## 2024-01-02 DIAGNOSIS — N40.0 BENIGN PROSTATIC HYPERPLASIA, UNSPECIFIED WHETHER LOWER URINARY TRACT SYMPTOMS PRESENT: Primary | ICD-10-CM

## 2024-01-02 PROCEDURE — 99499 UNLISTED E&M SERVICE: CPT | Mod: S$PBB,,, | Performed by: UROLOGY

## 2024-01-02 NOTE — PROGRESS NOTES
Patient here today to have his catheter removed.   ?  30 ml saline removed from catheter balloon.   24  fr Catheter removed .   Catheter bag contains 100 MLs of clear yellow urine   Informed patient to hydrate and return to clinic at 2:30 for bladder scan patient is unable to return due to him not having a sitter for his wife.  Strict instructions to call us (if during clinic hours) or go to ED (if after hours) if can't void.  Patient left the office in satisfactory condition.

## 2024-02-07 ENCOUNTER — OFFICE VISIT (OUTPATIENT)
Dept: UROLOGY | Facility: CLINIC | Age: 79
End: 2024-02-07
Payer: MEDICARE

## 2024-02-07 VITALS — HEIGHT: 65 IN | BODY MASS INDEX: 37.24 KG/M2

## 2024-02-07 DIAGNOSIS — N40.0 BENIGN PROSTATIC HYPERPLASIA, UNSPECIFIED WHETHER LOWER URINARY TRACT SYMPTOMS PRESENT: Primary | ICD-10-CM

## 2024-02-07 LAB — POC RESIDUAL URINE VOLUME: 64 ML (ref 0–100)

## 2024-02-07 PROCEDURE — 99213 OFFICE O/P EST LOW 20 MIN: CPT | Mod: PBBFAC,PO | Performed by: UROLOGY

## 2024-02-07 PROCEDURE — 51798 US URINE CAPACITY MEASURE: CPT | Mod: PBBFAC,PO | Performed by: UROLOGY

## 2024-02-07 PROCEDURE — 99024 POSTOP FOLLOW-UP VISIT: CPT | Mod: POP,,, | Performed by: UROLOGY

## 2024-02-07 PROCEDURE — 99999PBSHW POCT BLADDER SCAN: Mod: PBBFAC,,,

## 2024-02-07 PROCEDURE — 99999 PR PBB SHADOW E&M-EST. PATIENT-LVL III: CPT | Mod: PBBFAC,,, | Performed by: UROLOGY

## 2024-02-07 NOTE — PROGRESS NOTES
[facility] Established Patient/H&P:    Ryan Stewart is a 78 y.o. male who presents for follow up for urinary retention and gross hematuria.     Patient with a history of elevated PSA, gross hematuria and enlarged prostate previously managed with Dr. Quintanilla. He presented to [facility] ED with urinary retention after appendectomy on 5/6/21 for ruptured appendicitis. 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     Alexis catheter placed with 1.5L urine. CT renal stone on 5/17/21 revealed a significantly enlarged prostate.  He underwent voiding trial in clinic with NP Eliana Baez, but had the alexis replaced. He has since developed gross hematuria. He states that prior to his appendectomy he was voiding well. He has been on Flomax for several years. Now on Finasteride.     On review of records, he has a history of elevated PSA s/p biopsy in [place] in 2002. Declined any further biopsies. He is on Plavix for history of MI.        Interval History     7/22/21: Patient with a 170 cc prostate s/p TURP on 6/22/21. Pathology negative. Discharged home POD 2. Underwent successful voiding trial 1 week post-op. States his lower urinary tract symptoms have improved significantly. Remains on Flomax and Finasteride.      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      1/21/22: Patient is doing well. Continues to void fine with mild lower urinary tract symptoms. IPSS 4. QoL 0. PVR 26 mL. Discontinued Flomax and Finasteride.     2/22/23: Here for follow up. Doing well with no complaints. IPSS remains at 4. QoL 0. PVR 71 mL. He underwent CT abdomen pelvis with contrast which showed an enlarged prostate, 13 mm lesion on the left kidney - not changed in size and fatty liver.     11/12/23: Patient presented to the emergency department on 11/9/23 with gross hematuria. 18 [place] alexis placed and started on CBI. H/H dropped. Evaluated by Dr. Flowers who recommended up-sizing catheter and  restarting CBI. Attempted to clamp irrigation yesterday, but hematuria recurred. Urine culture negative.     12/6/23: Alexis has been removed after discharge. Here today for follow up. States he has had some weakening of his urinary stream since alexis removal. IPSS up to 13. PVR 32 mL today. Has not restarted his Plavix.  Urine dipstick with trace blood, protein and small leukocytes.     2/7/24: Cystoscopy and TRUS on 12/13/23 with a 140 cc prostate with trilobar hypertrophy and small prostate calcifications noted. Severe trabeculations and numerous diverticula.  He is now s/p uncomplicated TURP on 12/26/23. Doing well post-operatively. Only with urgency and nocturai x 2. No recurrence of his hematuria. He is on Finasteride 5 mg.     Denies any fever, chills, flank pain, nephrolithiasis,  trauma or history of  malignancy.         PSA  5.3                   11/3/20  4.7                   8/19/19  5.3                   9/4/18  6.0                   7/8/16  8.9                   9/4/13     Urine culture  No growth 12/13/23  E. Coli  12/6/23  No growth        11/9/23  Enterococcus  6/2/21  Enterococcus  5/6/21     IPSS    QoL  4 1 2/7/24  13        5          12/6/23  4          0          2/22/23  6          1          7/22/21     PVR  64 mL  2/7/24  32 mL              12/6/23  50 mL              11/20/23  71 mL              2/22/23  26 mL              1/21/22  0 mL                7/22/21  109 mL            6/29/21    IPSS QoL  4 1 2/7/24    PVR  64 mL  2/7/24        Past Medical History:   Diagnosis Date    BPH with obstruction/lower urinary tract symptoms     Cancer     prostate    Carcinoma of appendix     Coronary artery disease     Elevated PSA     Gilbert's disease 09/28/2021    Hyperlipidemia     Hypertension     MI (myocardial infarction) 2005    Primary appendiceal adenocarcinoma     Stented coronary artery 2005    RCA       Past Surgical History:   Procedure Laterality Date    APPENDECTOMY      CARDIAC  SURGERY      stent coronary    COLON SURGERY      CYSTOSCOPY      CYSTOSCOPY N/A 6/2/2021    Procedure: CYSTOSCOPY;  Surgeon: Craig Reza Jr., MD;  Location: [facility] OR;  Service: Urology;  Laterality: N/A;    CYSTOSCOPY N/A 12/13/2023    Procedure: CYSTOSCOPY;  Surgeon: Craig Reza Jr., MD;  Location: [facility] OR;  Service: Urology;  Laterality: N/A;    EAR EXAMINATION UNDER ANESTHESIA      LAPAROSCOPIC APPENDECTOMY N/A 5/6/2021    Procedure: APPENDECTOMY, LAPAROSCOPIC;  Surgeon: Sundar Garcia MD;  Location: [facility] OR;  Service: General;  Laterality: N/A;    LAPAROSCOPIC CHOLECYSTECTOMY N/A 10/10/2021    Procedure: CHOLECYSTECTOMY, LAPAROSCOPIC;  Surgeon: Eulogio Yusuf MD;  Location: [facility] OR;  Service: General;  Laterality: N/A;    SURGICAL REMOVAL OF ILEUM WITH CECUM  5/6/2021    Procedure: EXCISION, CECUM WITH ILEUM;  Surgeon: Sundar Garcia MD;  Location: [facility] OR;  Service: General;;    TRANSRECTAL ULTRASOUND EXAMINATION N/A 6/2/2021    Procedure: ULTRASOUND, RECTAL APPROACH;  Surgeon: Craig Reza Jr., MD;  Location: [facility] OR;  Service: Urology;  Laterality: N/A;    TRANSRECTAL ULTRASOUND EXAMINATION N/A 12/13/2023    Procedure: ULTRASOUND, RECTAL APPROACH;  Surgeon: Craig Reza Jr., MD;  Location: [facility] OR;  Service: Urology;  Laterality: N/A;    TRANSURETHRAL RESECTION OF PROSTATE N/A 6/22/2021    Procedure: TURP (TRANSURETHRAL RESECTION OF PROSTATE);  Surgeon: Craig Reza Jr., MD;  Location: [facility] OR;  Service: Urology;  Laterality: N/A;    TRANSURETHRAL RESECTION OF PROSTATE N/A 12/26/2023    Procedure: TURP (TRANSURETHRAL RESECTION OF PROSTATE);  Surgeon: Craig Reza Jr., MD;  Location: [facility] OR;  Service: Urology;  Laterality: N/A;       Review of patient's allergies indicates:   Allergen Reactions    Ciprofloxacin      vomiting    Rapaflo [silodosin]      Urinating increased, kidney pain    Simvastatin Other (See Comments)     Severe muscle pain       Medications Reviewed: see  "MAR    FOCUSED PHYSICAL EXAM:    There were no vitals filed for this visit.  Body mass index is 37.24 kg/m².   Height: 5' 5" (165.1 cm)       General: Alert, cooperative, no distress, appears stated age  Abdomen: Soft, non-tender, no CVA tenderness, non-distended      LABS:    Recent Results (from the past 336 hour(s))   POCT Bladder Scan    Collection Time: 02/07/24 10:49 AM   Result Value Ref Range    POC Residual Urine Volume 64 0 - 100 mL             Assessment/Diagnosis:    1. Benign prostatic hyperplasia, unspecified whether lower urinary tract symptoms present  POCT Bladder Scan          Plans:    - Doing well s/p TURP on 12/26/23.   - Continue Finasteride 5 mg PO daily.   - Follow up with cardiologist regarding restarting Plavix.   - RTC in 6 months with symptom score and PVR.     "

## 2024-05-13 ENCOUNTER — HOSPITAL ENCOUNTER (OUTPATIENT)
Dept: RADIOLOGY | Facility: HOSPITAL | Age: 79
Discharge: HOME | End: 2024-05-13
Attending: INTERNAL MEDICINE
Payer: MEDICARE

## 2024-05-13 DIAGNOSIS — E53.8 B12 DEFICIENCY: ICD-10-CM

## 2024-05-13 DIAGNOSIS — C18.1 CARCINOMA OF APPENDIX: ICD-10-CM

## 2024-05-13 DIAGNOSIS — E80.4 GILBERT'S SYNDROME: ICD-10-CM

## 2024-05-13 PROCEDURE — 25500020 PHARM REV CODE 255

## 2024-05-13 PROCEDURE — 74177 CT ABD & PELVIS W/CONTRAST: CPT | Mod: 26,,, | Performed by: RADIOLOGY

## 2024-05-13 PROCEDURE — 74177 CT ABD & PELVIS W/CONTRAST: CPT | Mod: TC

## 2024-05-13 PROCEDURE — A9698 NON-RAD CONTRAST MATERIALNOC: HCPCS

## 2024-05-13 RX ADMIN — IOHEXOL 100 ML: 350 INJECTION, SOLUTION INTRAVENOUS at 10:05

## 2024-05-13 RX ADMIN — IOHEXOL 1000 ML: 12 SOLUTION ORAL at 10:05

## 2024-05-21 ENCOUNTER — OFFICE VISIT (OUTPATIENT)
Dept: HEMATOLOGY/ONCOLOGY | Facility: CLINIC | Age: 79
End: 2024-05-21
Payer: MEDICARE

## 2024-05-21 VITALS
HEIGHT: 67 IN | DIASTOLIC BLOOD PRESSURE: 74 MMHG | SYSTOLIC BLOOD PRESSURE: 149 MMHG | RESPIRATION RATE: 12 BRPM | OXYGEN SATURATION: 96 % | HEART RATE: 65 BPM | BODY MASS INDEX: 35.64 KG/M2 | TEMPERATURE: 98 F | WEIGHT: 227.06 LBS

## 2024-05-21 DIAGNOSIS — C18.1 CARCINOMA OF APPENDIX: Primary | ICD-10-CM

## 2024-05-21 DIAGNOSIS — E80.4 GILBERT'S SYNDROME: ICD-10-CM

## 2024-05-21 PROCEDURE — 99214 OFFICE O/P EST MOD 30 MIN: CPT | Mod: S$PBB,,, | Performed by: INTERNAL MEDICINE

## 2024-05-21 NOTE — PROGRESS NOTES
HPI    79 year old male referred to Oncology for evaluation appendiceal carcinoma.  History of benign prostate hypertrophy with obstructive urine output.  Status post TURP by Dr Craig Reza on 21    History of prostate cancer.  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  Associated with acute appendicitis with a rupture.  Five benign lymph nodes retrieved.    Tumor size 0.8 cm.  Histological type adenocarcinoma.  Histologic  Grade moderately    Differentiated.  Tumor invades muscular peripheral.  Margin negative.  Tumor deposit negative.  Lymphovascular invasion negative.  Perineural invasion    Negative.  Pathological staging [id number]    On keep side being 1500mg b.i.d. daily 2 weeks on 1 week off    Past Medical History:   Diagnosis Date    BPH with obstruction/lower urinary tract symptoms     Cancer     prostate    Carcinoma of appendix     Coronary artery disease     Elevated PSA     Gilbert's disease 2021    Hyperlipidemia     Hypertension     MI (myocardial infarction)     Primary appendiceal adenocarcinoma     Stented coronary artery 2005    RCA     Social History     Socioeconomic History    Marital status:    Tobacco Use    Smoking status: Former     Current packs/day: 0.00     Types: Cigarettes     Quit date: 1991     Years since quittin.9    Smokeless tobacco: Never    Tobacco comments:     cigars and pipe   Substance and Sexual Activity    Alcohol use: Not Currently    Drug use: Not Currently         Objective    Physical Exam     There were no vitals filed for this visit.    Awake alert no acute distress  Normocephalic atraumatic pupils equal round reactive  Normal respiratory effort  Normal rate  Distal pulses intact  Peripheral edema  Grossly cranial nerves 2-12 grossly intact sensorimotor grossly intact  Patient ambulatory on his own power        CMP  Sodium   Date Value Ref Range  Status   05/13/2024 139 136 - 145 mmol/L Final     Potassium   Date Value Ref Range Status   05/13/2024 4.2 3.5 - 5.1 mmol/L Final     Chloride   Date Value Ref Range Status   05/13/2024 106 95 - 110 mmol/L Final     CO2   Date Value Ref Range Status   05/13/2024 24 23 - 29 mmol/L Final     Glucose   Date Value Ref Range Status   05/13/2024 111 (H) 70 - 110 mg/dL Final     BUN   Date Value Ref Range Status   05/13/2024 15 8 - 23 mg/dL Final     Creatinine   Date Value Ref Range Status   05/13/2024 0.9 0.5 - 1.4 mg/dL Final     Calcium   Date Value Ref Range Status   05/13/2024 8.8 8.7 - 10.5 mg/dL Final     Total Protein   Date Value Ref Range Status   05/13/2024 6.5 6.0 - 8.4 g/dL Final     Albumin   Date Value Ref Range Status   05/13/2024 3.8 3.5 - 5.2 g/dL Final     Total Bilirubin   Date Value Ref Range Status   05/13/2024 1.6 (H) 0.1 - 1.0 mg/dL Final     Comment:     For infants and newborns, interpretation of results should be based  on gestational age, weight and in agreement with clinical  observations.    Premature Infant recommended reference ranges:  Up to 24 hours.............<8.0 mg/dL  Up to 48 hours............<12.0 mg/dL  3-5 days..................<15.0 mg/dL  6-29 days.................<15.0 mg/dL       Alkaline Phosphatase   Date Value Ref Range Status   05/13/2024 58 55 - 135 U/L Final     AST   Date Value Ref Range Status   05/13/2024 17 10 - 40 U/L Final     ALT   Date Value Ref Range Status   05/13/2024 14 10 - 44 U/L Final     Anion Gap   Date Value Ref Range Status   05/13/2024 9 8 - 16 mmol/L Final     eGFR if    Date Value Ref Range Status   07/08/2022 >60 >60 mL/min/1.73 m^2 Final     eGFR if non    Date Value Ref Range Status   07/08/2022 >60 >60 mL/min/1.73 m^2 Final     Comment:     Calculation used to obtain the estimated glomerular filtration  rate (eGFR) is the CKD-EPI equation.        Lab Results   Component Value Date    WBC 6.22 05/13/2024    HGB  13.3 (L) 05/13/2024    HCT 41.0 05/13/2024    MCV 88 05/13/2024     05/13/2024 05/06/2021 surgical specimen  FINAL DIAGNOSIS:   05/10/2021 JAR/jr     ILEUM AND CECUM, ILEOCECETOMY   --APPENDIX WITH INVASIVE MODERATELY DIFFERENTIATED ADENOCARCINOMA.   --ASSOCIATED WITH ACUTE APPENDICITIS WITH RUPTURE.   --FIVE BENIGN LYMPH NODES.   --BACKGROUND COLON AND SMALL INTESTINE WITH SEROSAL ADHESIONS.   --SEE TUMOR SYNOPTIC REPORT.     Comment:   Additional dissection revealed one additional benign lymph node.     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 Sundar Garcia.  Unable to preform additional surgery due to patient co-morbility     With evidence of a rupture consider microscopic tumor deposit as a high risk factor.    Patient is 77 years old with comorbidities such as a previous diagnosis of MI as well as recent  TURP    Gilbert syndrome    CEA 2.2 one year post surgery resection     B12 146    Stable H/H    > Completed capecitabine therapy 10 cycles.  Capecitabine monotherapy 1000mg/m2 b.i.d. (from 1250mg/m2 due to underlying liver condition, age and comorbidities) daily 2 weeks on 1 week off Will start NCCN surveillance guidelines monitor.    > Rupture high risk factor will have to consider microscopic tumor deposit.  However given  patient's age and comorbidity it is unlikely that he will tolerate any aggressive measure.  I have spoken to surgical team, reported patient cannot tolerate additional surgery.    > review CT scan with patient today.  1.3 cm left kidney appear to be cystic.  Enlarged prostate.  Patient will follow up with Urology.    > s/p gallbladder infection, elevated transaminitis, cholecystectomy on 10/11/2021.  Follow up with surgery    > start on B12 daily with good respond will continue     > colonoscopy 02/28/2023 normal examination.  Anastomosis appear to be intact.  No evidence of polyps.  Repeat colonoscopy is not recommended for surveillance at this time. Dr Elias Jaquez     CT abdomen pelvis with contrast 05/13/2024 no evidence of  malignancy    > RTC 6 month with lab

## 2024-10-04 ENCOUNTER — OFFICE VISIT (OUTPATIENT)
Dept: UROLOGY | Facility: CLINIC | Age: 79
End: 2024-10-04
Payer: MEDICARE

## 2024-10-04 VITALS — WEIGHT: 227 LBS | BODY MASS INDEX: 36.48 KG/M2 | HEIGHT: 66 IN

## 2024-10-04 DIAGNOSIS — R33.9 URINARY RETENTION: Primary | ICD-10-CM

## 2024-10-04 PROCEDURE — 99212 OFFICE O/P EST SF 10 MIN: CPT | Mod: PBBFAC,PO | Performed by: UROLOGY

## 2024-10-04 PROCEDURE — 99999 PR PBB SHADOW E&M-EST. PATIENT-LVL II: CPT | Mod: PBBFAC,,, | Performed by: UROLOGY

## 2024-10-04 RX ORDER — FINASTERIDE 5 MG/1
5 TABLET, FILM COATED ORAL DAILY
Qty: 90 TABLET | Refills: 3 | Status: SHIPPED | OUTPATIENT
Start: 2024-10-04 | End: 2025-10-04

## 2024-10-04 NOTE — PROGRESS NOTES
[facility] Established Patient/H&P:    Ryan Stewart is a 79 y.o. male who presents for follow up for urinary retention and gross hematuria.     Patient with a history of elevated PSA, gross hematuria and enlarged prostate previously managed with Dr. Quintanilla. He presented to [facility] ED with urinary retention after appendectomy on 5/6/21 for ruptured appendicitis. 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     Alexis catheter placed with 1.5L urine. CT renal stone on 5/17/21 revealed a significantly enlarged prostate.  He underwent voiding trial in clinic with NP Eliana Baez, but had the alexis replaced. He has since developed gross hematuria. He states that prior to his appendectomy he was voiding well. He has been on Flomax for several years. Now on Finasteride.     On review of records, he has a history of elevated PSA s/p biopsy in [place] in 2002. Declined any further biopsies. He is on Plavix for history of MI.        Interval History     7/22/21: Patient with a 170 cc prostate s/p TURP on 6/22/21. Pathology negative. Discharged home POD 2. Underwent successful voiding trial 1 week post-op. States his lower urinary tract symptoms have improved significantly. Remains on Flomax and Finasteride.      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      1/21/22: Patient is doing well. Continues to void fine with mild lower urinary tract symptoms. IPSS 4. QoL 0. PVR 26 mL. Discontinued Flomax and Finasteride.     2/22/23: Here for follow up. Doing well with no complaints. IPSS remains at 4. QoL 0. PVR 71 mL. He underwent CT abdomen pelvis with contrast which showed an enlarged prostate, 13 mm lesion on the left kidney - not changed in size and fatty liver.     11/12/23: Patient presented to the emergency department on 11/9/23 with gross hematuria. 18 Frisian alexis placed and started on CBI. H/H dropped. Evaluated by Dr. Flowers who recommended up-sizing catheter and  restarting CBI. Attempted to clamp irrigation yesterday, but hematuria recurred. Urine culture negative.     12/6/23: Alexis has been removed after discharge. Here today for follow up. States he has had some weakening of his urinary stream since alexis removal. IPSS up to 13. PVR 32 mL today. Has not restarted his Plavix.  Urine dipstick with trace blood, protein and small leukocytes.      2/7/24: Cystoscopy and TRUS on 12/13/23 with a 140 cc prostate with trilobar hypertrophy and small prostate calcifications noted. Severe trabeculations and numerous diverticula.  He is now s/p uncomplicated TURP on 12/26/23. Doing well post-operatively. Only with urgency and nocturai x 2. No recurrence of his hematuria. He is on Finasteride 5 mg.    10/4/24: Here today for follow up. States he is doing well today and continues to void well withno significant complaints. Remains on Finasteride 5 mg PO daily. CT abdomen pelvis with contrast on 5/13/24 negative.      Denies any fever, chills, flank pain, nephrolithiasis,  trauma or history of  malignancy.         PSA  5.3                   11/3/20  4.7                   8/19/19  5.3                   9/4/18  6.0                   7/8/16  8.9                   9/4/13     Urine culture  No growth 12/13/23  E. Coli              12/6/23  No growth        11/9/23  Enterococcus  6/2/21  Enterococcus  5/6/21     IPSS    QoL  7 2 10/4/24  4 1 2/7/24  13        5          12/6/23  4          0          2/22/23  6          1          7/22/21     PVR  64 mL              2/7/24  32 mL              12/6/23  50 mL              11/20/23  71 mL              2/22/23  26 mL              1/21/22  0 mL                7/22/21  109 mL            6/29/21       Past Medical History:   Diagnosis Date    BPH with obstruction/lower urinary tract symptoms     Cancer     prostate    Carcinoma of appendix     Coronary artery disease     Elevated PSA     Gilbert's disease 09/28/2021    Hyperlipidemia      Hypertension     MI (myocardial infarction) 2005    Primary appendiceal adenocarcinoma     Stented coronary artery 2005    RCA       Past Surgical History:   Procedure Laterality Date    APPENDECTOMY      CARDIAC SURGERY      stent coronary    COLON SURGERY      CYSTOSCOPY      CYSTOSCOPY N/A 6/2/2021    Procedure: CYSTOSCOPY;  Surgeon: Craig Reza Jr., MD;  Location: [facility] OR;  Service: Urology;  Laterality: N/A;    CYSTOSCOPY N/A 12/13/2023    Procedure: CYSTOSCOPY;  Surgeon: Craig Reza Jr., MD;  Location: [facility] OR;  Service: Urology;  Laterality: N/A;    EAR EXAMINATION UNDER ANESTHESIA      LAPAROSCOPIC APPENDECTOMY N/A 5/6/2021    Procedure: APPENDECTOMY, LAPAROSCOPIC;  Surgeon: Sundar Garcia MD;  Location: [facility];  Service: General;  Laterality: N/A;    LAPAROSCOPIC CHOLECYSTECTOMY N/A 10/10/2021    Procedure: CHOLECYSTECTOMY, LAPAROSCOPIC;  Surgeon: Eulogio Yusuf MD;  Location: [facility];  Service: General;  Laterality: N/A;    SURGICAL REMOVAL OF ILEUM WITH CECUM  5/6/2021    Procedure: EXCISION, CECUM WITH ILEUM;  Surgeon: Sundar Garcia MD;  Location: [facility] OR;  Service: General;;    TRANSRECTAL ULTRASOUND EXAMINATION N/A 6/2/2021    Procedure: ULTRASOUND, RECTAL APPROACH;  Surgeon: Craig Reza Jr., MD;  Location: [facility];  Service: Urology;  Laterality: N/A;    TRANSRECTAL ULTRASOUND EXAMINATION N/A 12/13/2023    Procedure: ULTRASOUND, RECTAL APPROACH;  Surgeon: Craig Reza Jr., MD;  Location: [facility] OR;  Service: Urology;  Laterality: N/A;    TRANSURETHRAL RESECTION OF PROSTATE N/A 6/22/2021    Procedure: TURP (TRANSURETHRAL RESECTION OF PROSTATE);  Surgeon: Craig Reza Jr., MD;  [facility];  Service: Urology;  Laterality: N/A;    TRANSURETHRAL RESECTION OF PROSTATE N/A 12/26/2023    Procedure: TURP (TRANSURETHRAL RESECTION OF PROSTATE);  Surgeon: Craig Reza Jr., MD;  Location: [facility] OR;  Service: Urology;  Laterality: N/A;       Review of patient's allergies  "indicates:   Allergen Reactions    Ciprofloxacin      vomiting    Rapaflo [silodosin]      Urinating increased, kidney pain    Simvastatin Other (See Comments)     Severe muscle pain       Medications Reviewed: see MAR    FOCUSED PHYSICAL EXAM:    There were no vitals filed for this visit.  Body mass index is 36.64 kg/m². Weight: 103 kg (227 lb) Height: 5' 6" (167.6 cm)       General: Alert, cooperative, no distress, appears stated age  Abdomen: Soft, non-tender, no CVA tenderness, non-distended      LABS:    No results found for this or any previous visit (from the past 2 weeks).        Assessment/Diagnosis:    1. Urinary retention  finasteride (PROSCAR) 5 mg tablet          Plans:    - I spent 30 minutes of the day of this encounter preparing for, treating and managing this patient. Visit today included increased complexity associated with the care of the episodic problem addressed and managing the longitudinal care of the patient due to the serious and/or complex managed problem(s) urinary retention. Extensive discussion with patient regarding the etiology and management of his lower urinary tract symptoms. Explained that LUTS are multifactorial and can be secondary to an enlarged prostate, PO intake of bladder irritants, overactive bladder, constipation, malignancy, trauma, infection, stones or medications. Doing well s/p TURP on 12/26/23. IPSS remains low.   - Continue Finasteride 5 mg PO daily. Refills ordered.   - RTC in 1 year with symptom score and PVR.           "

## 2024-11-22 ENCOUNTER — LAB VISIT (OUTPATIENT)
Dept: LAB | Facility: HOSPITAL | Age: 79
End: 2024-11-22
Attending: INTERNAL MEDICINE
Payer: MEDICARE

## 2024-11-22 DIAGNOSIS — E80.4 GILBERT'S SYNDROME: ICD-10-CM

## 2024-11-22 DIAGNOSIS — C18.1 CARCINOMA OF APPENDIX: ICD-10-CM

## 2024-11-22 LAB
ALBUMIN SERPL BCP-MCNC: 3.7 G/DL (ref 3.5–5.2)
ALP SERPL-CCNC: 61 U/L (ref 40–150)
ALT SERPL W/O P-5'-P-CCNC: 19 U/L (ref 10–44)
ANION GAP SERPL CALC-SCNC: 9 MMOL/L (ref 8–16)
AST SERPL-CCNC: 17 U/L (ref 10–40)
BASOPHILS # BLD AUTO: 0.08 K/UL (ref 0–0.2)
BASOPHILS NFR BLD: 1.4 % (ref 0–1.9)
BILIRUB SERPL-MCNC: 1.7 MG/DL (ref 0.1–1)
BUN SERPL-MCNC: 16 MG/DL (ref 8–23)
CALCIUM SERPL-MCNC: 8.8 MG/DL (ref 8.7–10.5)
CEA SERPL-MCNC: 2 NG/ML (ref 0–5)
CHLORIDE SERPL-SCNC: 106 MMOL/L (ref 95–110)
CO2 SERPL-SCNC: 24 MMOL/L (ref 23–29)
CREAT SERPL-MCNC: 0.9 MG/DL (ref 0.5–1.4)
DIFFERENTIAL METHOD BLD: ABNORMAL
EOSINOPHIL # BLD AUTO: 0.1 K/UL (ref 0–0.5)
EOSINOPHIL NFR BLD: 2 % (ref 0–8)
ERYTHROCYTE [DISTWIDTH] IN BLOOD BY AUTOMATED COUNT: 12.4 % (ref 11.5–14.5)
EST. GFR  (NO RACE VARIABLE): >60 ML/MIN/1.73 M^2
GLUCOSE SERPL-MCNC: 128 MG/DL (ref 70–110)
HCT VFR BLD AUTO: 42 % (ref 40–54)
HGB BLD-MCNC: 14.1 G/DL (ref 14–18)
IMM GRANULOCYTES # BLD AUTO: 0.03 K/UL (ref 0–0.04)
IMM GRANULOCYTES NFR BLD AUTO: 0.5 % (ref 0–0.5)
LYMPHOCYTES # BLD AUTO: 1.4 K/UL (ref 1–4.8)
LYMPHOCYTES NFR BLD: 23.9 % (ref 18–48)
MCH RBC QN AUTO: 31.1 PG (ref 27–31)
MCHC RBC AUTO-ENTMCNC: 33.6 G/DL (ref 32–36)
MCV RBC AUTO: 93 FL (ref 82–98)
MONOCYTES # BLD AUTO: 0.5 K/UL (ref 0.3–1)
MONOCYTES NFR BLD: 8 % (ref 4–15)
NEUTROPHILS # BLD AUTO: 3.8 K/UL (ref 1.8–7.7)
NEUTROPHILS NFR BLD: 64.2 % (ref 38–73)
NRBC BLD-RTO: 0 /100 WBC
PLATELET # BLD AUTO: 194 K/UL (ref 150–450)
PMV BLD AUTO: 10.2 FL (ref 9.2–12.9)
POTASSIUM SERPL-SCNC: 4.2 MMOL/L (ref 3.5–5.1)
PROT SERPL-MCNC: 6.4 G/DL (ref 6–8.4)
RBC # BLD AUTO: 4.54 M/UL (ref 4.6–6.2)
SODIUM SERPL-SCNC: 139 MMOL/L (ref 136–145)
WBC # BLD AUTO: 5.86 K/UL (ref 3.9–12.7)

## 2024-11-22 PROCEDURE — 82378 CARCINOEMBRYONIC ANTIGEN: CPT | Performed by: INTERNAL MEDICINE

## 2024-11-22 PROCEDURE — 36415 COLL VENOUS BLD VENIPUNCTURE: CPT | Performed by: INTERNAL MEDICINE

## 2024-11-22 PROCEDURE — 85025 COMPLETE CBC W/AUTO DIFF WBC: CPT | Performed by: INTERNAL MEDICINE

## 2024-11-22 PROCEDURE — 80053 COMPREHEN METABOLIC PANEL: CPT | Performed by: INTERNAL MEDICINE

## 2024-11-25 ENCOUNTER — OFFICE VISIT (OUTPATIENT)
Dept: HEMATOLOGY/ONCOLOGY | Facility: CLINIC | Age: 79
End: 2024-11-25
Payer: MEDICARE

## 2024-11-25 VITALS
SYSTOLIC BLOOD PRESSURE: 148 MMHG | BODY MASS INDEX: 35.82 KG/M2 | WEIGHT: 228.19 LBS | TEMPERATURE: 98 F | RESPIRATION RATE: 16 BRPM | DIASTOLIC BLOOD PRESSURE: 80 MMHG | OXYGEN SATURATION: 97 % | HEART RATE: 64 BPM | HEIGHT: 67 IN

## 2024-11-25 DIAGNOSIS — Z79.899 DRUG-INDUCED IMMUNODEFICIENCY: ICD-10-CM

## 2024-11-25 DIAGNOSIS — C18.1 MALIGNANT NEOPLASM OF APPENDIX: ICD-10-CM

## 2024-11-25 DIAGNOSIS — D84.821 DRUG-INDUCED IMMUNODEFICIENCY: ICD-10-CM

## 2024-11-25 DIAGNOSIS — E66.01 SEVERE OBESITY (BMI 35.0-39.9) WITH COMORBIDITY: Primary | ICD-10-CM

## 2024-11-25 PROCEDURE — 99214 OFFICE O/P EST MOD 30 MIN: CPT | Mod: PBBFAC,PN | Performed by: INTERNAL MEDICINE

## 2024-11-25 PROCEDURE — 99999 PR PBB SHADOW E&M-EST. PATIENT-LVL IV: CPT | Mod: PBBFAC,,, | Performed by: INTERNAL MEDICINE

## 2024-11-25 PROCEDURE — 99214 OFFICE O/P EST MOD 30 MIN: CPT | Mod: S$PBB,,, | Performed by: INTERNAL MEDICINE

## 2024-11-25 NOTE — PROGRESS NOTES
HPI    79 year old male referred to Oncology for evaluation appendiceal carcinoma.  History of benign prostate hypertrophy with obstructive urine output.  Status post TURP by Dr Craig Reza on 21    History of prostate cancer.  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  Associated with acute appendicitis with a rupture.  Five benign lymph nodes retrieved.    Tumor size 0.8 cm.  Histological type adenocarcinoma.  Histologic  Grade moderately    Differentiated.  Tumor invades muscular peripheral.  Margin negative.  Tumor deposit negative.  Lymphovascular invasion negative.  Perineural invasion    Negative.  Pathological staging [id number]    On keep side being 1500mg b.i.d. daily 2 weeks on 1 week off    Past Medical History:   Diagnosis Date    BPH with obstruction/lower urinary tract symptoms     Cancer     prostate    Carcinoma of appendix     Coronary artery disease     Elevated PSA     Gilbert's disease 2021    Hyperlipidemia     Hypertension     MI (myocardial infarction)     Primary appendiceal adenocarcinoma     Stented coronary artery 2005    RCA     Social History     Socioeconomic History    Marital status:    Tobacco Use    Smoking status: Former     Current packs/day: 0.00     Types: Cigarettes     Quit date: 1991     Years since quittin.4    Smokeless tobacco: Never    Tobacco comments:     cigars and pipe   Substance and Sexual Activity    Alcohol use: Not Currently    Drug use: Not Currently         Objective    Physical Exam     There were no vitals filed for this visit.    Awake alert no acute distress  Normocephalic atraumatic pupils equal round reactive  Normal respiratory effort  Normal rate  Distal pulses intact  Peripheral edema  Grossly cranial nerves 2-12 grossly intact sensorimotor grossly intact  Patient ambulatory on his own power        CMP  Sodium   Date Value Ref Range  Status   11/22/2024 139 136 - 145 mmol/L Final     Potassium   Date Value Ref Range Status   11/22/2024 4.2 3.5 - 5.1 mmol/L Final     Chloride   Date Value Ref Range Status   11/22/2024 106 95 - 110 mmol/L Final     CO2   Date Value Ref Range Status   11/22/2024 24 23 - 29 mmol/L Final     Glucose   Date Value Ref Range Status   11/22/2024 128 (H) 70 - 110 mg/dL Final     BUN   Date Value Ref Range Status   11/22/2024 16 8 - 23 mg/dL Final     Creatinine   Date Value Ref Range Status   11/22/2024 0.9 0.5 - 1.4 mg/dL Final     Calcium   Date Value Ref Range Status   11/22/2024 8.8 8.7 - 10.5 mg/dL Final     Total Protein   Date Value Ref Range Status   11/22/2024 6.4 6.0 - 8.4 g/dL Final     Albumin   Date Value Ref Range Status   11/22/2024 3.7 3.5 - 5.2 g/dL Final     Total Bilirubin   Date Value Ref Range Status   11/22/2024 1.7 (H) 0.1 - 1.0 mg/dL Final     Comment:     For infants and newborns, interpretation of results should be based  on gestational age, weight and in agreement with clinical  observations.    Premature Infant recommended reference ranges:  Up to 24 hours.............<8.0 mg/dL  Up to 48 hours............<12.0 mg/dL  3-5 days..................<15.0 mg/dL  6-29 days.................<15.0 mg/dL       Alkaline Phosphatase   Date Value Ref Range Status   11/22/2024 61 40 - 150 U/L Final     AST   Date Value Ref Range Status   11/22/2024 17 10 - 40 U/L Final     ALT   Date Value Ref Range Status   11/22/2024 19 10 - 44 U/L Final     Anion Gap   Date Value Ref Range Status   11/22/2024 9 8 - 16 mmol/L Final     eGFR if    Date Value Ref Range Status   07/08/2022 >60 >60 mL/min/1.73 m^2 Final     eGFR if non    Date Value Ref Range Status   07/08/2022 >60 >60 mL/min/1.73 m^2 Final     Comment:     Calculation used to obtain the estimated glomerular filtration  rate (eGFR) is the CKD-EPI equation.        Lab Results   Component Value Date    WBC 5.86 11/22/2024    HGB  14.1 11/22/2024    HCT 42.0 11/22/2024    MCV 93 11/22/2024     11/22/2024 05/06/2021 surgical specimen  FINAL DIAGNOSIS:   05/10/2021 JAR/jr     ILEUM AND CECUM, ILEOCECETOMY   --APPENDIX WITH INVASIVE MODERATELY DIFFERENTIATED ADENOCARCINOMA.   --ASSOCIATED WITH ACUTE APPENDICITIS WITH RUPTURE.   --FIVE BENIGN LYMPH NODES.   --BACKGROUND COLON AND SMALL INTESTINE WITH SEROSAL ADHESIONS.   --SEE TUMOR SYNOPTIC REPORT.     Comment:   Additional dissection revealed one additional benign lymph node.     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 Sundar Garcia.  Unable to preform additional surgery due to patient co-morbility     With evidence of a rupture consider microscopic tumor deposit as a high risk factor.    Patient is 77 years old with comorbidities such as a previous diagnosis of MI as well as recent  TURP    Gilbert syndrome    CEA 2.2 one year post surgery resection     B12 146    Stable H/H    > Completed capecitabine therapy 10 cycles.  Capecitabine monotherapy 1000mg/m2 b.i.d. (from 1250mg/m2 due to underlying liver condition, age and comorbidities) daily 2 weeks on 1 week off Will start NCCN surveillance guidelines monitor.    > Rupture high risk factor will have to consider microscopic tumor deposit.  However given  patient's age and comorbidity it is unlikely that he will tolerate any aggressive measure.  I have spoken to surgical team, reported patient cannot tolerate additional surgery.    > review CT scan with patient today.  1.3 cm left kidney appear to be cystic.  Enlarged prostate.  Patient will follow up with Urology.    > s/p gallbladder infection, elevated transaminitis, cholecystectomy on 10/11/2021.  Follow up with surgery    > start on B12 daily with good respond will continue     > colonoscopy 02/28/2023 normal examination.  Anastomosis appear to be intact.  No evidence of polyps.  Repeat colonoscopy is not recommended for surveillance at this time. Dr Elias Jaquez     CT abdomen pelvis with contrast 05/13/2024 no evidence of  malignancy    > RTC 6 month with lab

## 2025-03-24 ENCOUNTER — TELEPHONE (OUTPATIENT)
Dept: UROLOGY | Facility: CLINIC | Age: 80
End: 2025-03-24
Payer: MEDICARE

## 2025-03-24 NOTE — TELEPHONE ENCOUNTER
----- Message from Alycia sent at 3/24/2025  1:59 PM CDT -----  Regarding: Refill  Type:  RX Refill RequestWho Called: ptRefill or New Rx: refillRX Name and Strength: finasteride (PROSCAR) 5 mg tabletHow is the patient currently taking it? (ex. 1XDay): as directedIs this a 30 day or 90 day RX: 90Preferred Pharmacy with phone number: [facility] - [facility] [address] [address]Ryxtv: [phone] Fax: [phone] or Mail Order: mailOrdering Provider: Jillianould the patient rather a call back or a response via [facility]? callGuiaBolso Call Back Number: [phone] Information:  pt changed out there pharmacy to [facility] and needs a new script sent in asa because he does not want to go without his scrpit.    Please call back to advise. Thanks!

## 2025-05-19 ENCOUNTER — TELEPHONE (OUTPATIENT)
Dept: HEMATOLOGY/ONCOLOGY | Facility: CLINIC | Age: 80
End: 2025-05-19
Payer: MEDICARE

## 2025-05-19 NOTE — TELEPHONE ENCOUNTER
Called pt to get upcoming appt on 05/26/2025 with  rescheduled due to  being out of clinic that day. Office number was left on  to call back to get rescheduled.

## 2025-05-23 ENCOUNTER — TELEPHONE (OUTPATIENT)
Dept: HEMATOLOGY/ONCOLOGY | Facility: CLINIC | Age: 80
End: 2025-05-23
Payer: MEDICARE

## 2025-05-23 NOTE — TELEPHONE ENCOUNTER
LVM to confirm pt upcoming appt with  on 05/30/2025. Office number was left on  for any further questions or concerns.

## 2025-05-28 ENCOUNTER — HOSPITAL ENCOUNTER (OUTPATIENT)
Dept: RADIOLOGY | Facility: HOSPITAL | Age: 80
Discharge: HOME | End: 2025-05-28
Attending: INTERNAL MEDICINE
Payer: MEDICARE

## 2025-05-28 DIAGNOSIS — C18.1 MALIGNANT NEOPLASM OF APPENDIX: ICD-10-CM

## 2025-05-28 DIAGNOSIS — E66.01 SEVERE OBESITY (BMI 35.0-39.9) WITH COMORBIDITY: ICD-10-CM

## 2025-05-28 LAB
CREAT SERPL-MCNC: 1 MG/DL (ref 0.5–1.4)
SAMPLE: NORMAL

## 2025-05-28 PROCEDURE — 74177 CT ABD & PELVIS W/CONTRAST: CPT | Mod: TC,PO

## 2025-05-28 PROCEDURE — 25500020 PHARM REV CODE 255: Mod: PO | Performed by: INTERNAL MEDICINE

## 2025-05-28 PROCEDURE — 71260 CT THORAX DX C+: CPT | Mod: 26,,, | Performed by: RADIOLOGY

## 2025-05-28 PROCEDURE — 74177 CT ABD & PELVIS W/CONTRAST: CPT | Mod: 26,,, | Performed by: RADIOLOGY

## 2025-05-28 RX ADMIN — IOHEXOL 100 ML: 350 INJECTION, SOLUTION INTRAVENOUS at 08:05

## 2025-05-30 ENCOUNTER — OFFICE VISIT (OUTPATIENT)
Dept: HEMATOLOGY/ONCOLOGY | Facility: CLINIC | Age: 80
End: 2025-05-30
Payer: MEDICARE

## 2025-05-30 VITALS
OXYGEN SATURATION: 98 % | RESPIRATION RATE: 16 BRPM | HEART RATE: 67 BPM | SYSTOLIC BLOOD PRESSURE: 183 MMHG | TEMPERATURE: 97 F | BODY MASS INDEX: 35.91 KG/M2 | DIASTOLIC BLOOD PRESSURE: 85 MMHG | HEIGHT: 67 IN | WEIGHT: 228.81 LBS

## 2025-05-30 DIAGNOSIS — C18.1 MALIGNANT NEOPLASM OF APPENDIX: ICD-10-CM

## 2025-05-30 DIAGNOSIS — Z85.9 HISTORY OF CANCER: Primary | ICD-10-CM

## 2025-05-30 PROCEDURE — 99999 PR PBB SHADOW E&M-EST. PATIENT-LVL III: CPT | Mod: PBBFAC,,, | Performed by: INTERNAL MEDICINE

## 2025-05-30 NOTE — PROGRESS NOTES
HPI    80 year old male referred to Oncology for evaluation appendiceal carcinoma.  History of benign prostate hypertrophy with obstructive urine output.  Status post TURP by Dr Craig Reza on 21    History of prostate cancer.  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  Associated with acute appendicitis with a rupture.  Five benign lymph nodes retrieved.    Tumor size 0.8 cm.  Histological type adenocarcinoma.  Histologic  Grade moderately    Differentiated.  Tumor invades muscular peripheral.  Margin negative.  Tumor deposit negative.  Lymphovascular invasion negative.  Perineural invasion    Negative.  Pathological staging [id number]    On keep side being 1500mg b.i.d. daily 2 weeks on 1 week off    Past Medical History:   Diagnosis Date    BPH with obstruction/lower urinary tract symptoms     Cancer     prostate    Carcinoma of appendix     Coronary artery disease     Elevated PSA     Gilbert's disease 2021    Hyperlipidemia     Hypertension     MI (myocardial infarction)     Primary appendiceal adenocarcinoma     Stented coronary artery 2005    RCA     Social History     Socioeconomic History    Marital status:    Tobacco Use    Smoking status: Former     Current packs/day: 0.00     Types: Cigarettes     Quit date: 1991     Years since quittin.9    Smokeless tobacco: Never    Tobacco comments:     cigars and pipe   Substance and Sexual Activity    Alcohol use: Not Currently    Drug use: Not Currently         Objective    Physical Exam     There were no vitals filed for this visit.    Awake alert no acute distress  Normocephalic atraumatic pupils equal round reactive  Normal respiratory effort  Normal rate  Distal pulses intact  Peripheral edema  Grossly cranial nerves 2-12 grossly intact sensorimotor grossly intact  Patient ambulatory on his own power        CMP  Sodium   Date Value Ref Range  Status   05/28/2025 137 136 - 145 mmol/L Final     Potassium   Date Value Ref Range Status   05/28/2025 4.1 3.5 - 5.1 mmol/L Final     Chloride   Date Value Ref Range Status   05/28/2025 103 95 - 110 mmol/L Final     CO2   Date Value Ref Range Status   05/28/2025 26 23 - 29 mmol/L Final     Glucose   Date Value Ref Range Status   05/28/2025 108 70 - 110 mg/dL Final     BUN   Date Value Ref Range Status   05/28/2025 13 8 - 23 mg/dL Final     Creatinine   Date Value Ref Range Status   05/28/2025 0.9 0.5 - 1.4 mg/dL Final     Calcium   Date Value Ref Range Status   05/28/2025 8.2 (L) 8.7 - 10.5 mg/dL Final     Protein Total   Date Value Ref Range Status   05/28/2025 5.9 (L) 6.0 - 8.4 gm/dL Final     Albumin   Date Value Ref Range Status   05/28/2025 3.8 3.5 - 5.2 g/dL Final     Bilirubin Total   Date Value Ref Range Status   05/28/2025 1.8 (H) 0.1 - 1.0 mg/dL Final     Comment:     For infants and newborns, interpretation of results should be based   on gestational age, weight and in agreement with clinical   observations.    Premature Infant recommended reference ranges:   0-24 hours:  <8.0 mg/dL   24-48 hours: <12.0 mg/dL   3-5 days:    <15.0 mg/dL   6-29 days:   <15.0 mg/dL     ALP   Date Value Ref Range Status   05/28/2025 47 (L) 55 - 135 unit/L Final     AST   Date Value Ref Range Status   05/28/2025 16 10 - 40 unit/L Final     ALT   Date Value Ref Range Status   05/28/2025 17 10 - 44 unit/L Final     Anion Gap   Date Value Ref Range Status   05/28/2025 8 8 - 16 mmol/L Final     eGFR if    Date Value Ref Range Status   07/08/2022 >60 >60 mL/min/1.73 m^2 Final     eGFR if non    Date Value Ref Range Status   07/08/2022 >60 >60 mL/min/1.73 m^2 Final     Comment:     Calculation used to obtain the estimated glomerular filtration  rate (eGFR) is the CKD-EPI equation.        Lab Results   Component Value Date    WBC 5.56 05/28/2025    HGB 13.3 (L) 05/28/2025    HCT 39.9 (L) 05/28/2025     MCV 94 05/28/2025     05/28/2025 05/06/2021 surgical specimen  FINAL DIAGNOSIS:   05/10/2021 JAR/jr     ILEUM AND CECUM, ILEOCECETOMY   --APPENDIX WITH INVASIVE MODERATELY DIFFERENTIATED ADENOCARCINOMA.   --ASSOCIATED WITH ACUTE APPENDICITIS WITH RUPTURE.   --FIVE BENIGN LYMPH NODES.   --BACKGROUND COLON AND SMALL INTESTINE WITH SEROSAL ADHESIONS.   --SEE TUMOR SYNOPTIC REPORT.     Comment:   Additional dissection revealed one additional benign lymph node.     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 Sundar Garcia.  Unable to preform additional surgery due to patient co-morbility     With evidence of a rupture consider microscopic tumor deposit as a high risk factor.    Patient is 77 years old with comorbidities such as a previous diagnosis of MI as well as recent  TURP    Gilbert syndrome    CEA 2.2 one year post surgery resection     B12 146    Stable H/H    > Completed capecitabine therapy 10 cycles.  Capecitabine monotherapy 1000mg/m2 b.i.d. (from 1250mg/m2 due to underlying liver condition, age and comorbidities) daily 2 weeks on 1 week off Will start NCCN surveillance guidelines monitor.    > Rupture high risk factor will have to consider microscopic tumor deposit.  However given patient's age and comorbidity it is unlikely that  he will tolerate any aggressive measure.  I have spoken to surgical team, reported patient cannot tolerate additional surgery.    > review CT scan with patient today.  1.3 cm left kidney appear to be cystic.  Enlarged prostate.  Patient will follow up with Urology.    > s/p gallbladder infection, elevated transaminitis, cholecystectomy on 10/11/2021.  Follow up with surgery    > start on B12 daily with good respond will continue     > colonoscopy 02/28/2023 normal examination.  Anastomosis appear to be intact.  No evidence of polyps.  Repeat colonoscopy is not recommended for surveillance at this time. Dr Elias Jaquez     CT abdomen pelvis with contrast 5/28/25  Impression:     1. No acute cardiopulmonary abnormality.  2. No new or suspicious intra-abdominal abnormality observed.  3. Multiple cysts of the liver are unchanged.  4. Multiple ventral abdominal wall and umbilical hernias observed with herniated loops of small bowel.  No evidence of obstruction.  5. Small bilateral fat filled inguinal hernias.  6. Additional incidental observations as described.    > RTC 6 month with lab

## 2025-09-03 ENCOUNTER — OFFICE VISIT (OUTPATIENT)
Dept: UROLOGY | Facility: CLINIC | Age: 80
End: 2025-09-03
Payer: MEDICARE

## 2025-09-03 DIAGNOSIS — R33.9 URINARY RETENTION: Primary | ICD-10-CM

## 2025-09-03 DIAGNOSIS — N40.0 BENIGN PROSTATIC HYPERPLASIA, UNSPECIFIED WHETHER LOWER URINARY TRACT SYMPTOMS PRESENT: ICD-10-CM

## 2025-09-03 PROCEDURE — 1126F AMNT PAIN NOTED NONE PRSNT: CPT | Mod: CPTII,S$GLB,, | Performed by: UROLOGY

## 2025-09-03 PROCEDURE — 3288F FALL RISK ASSESSMENT DOCD: CPT | Mod: CPTII,S$GLB,, | Performed by: UROLOGY

## 2025-09-03 PROCEDURE — 99214 OFFICE O/P EST MOD 30 MIN: CPT | Mod: S$GLB,,, | Performed by: UROLOGY

## 2025-09-03 PROCEDURE — 1160F RVW MEDS BY RX/DR IN RCRD: CPT | Mod: CPTII,S$GLB,, | Performed by: UROLOGY

## 2025-09-03 PROCEDURE — 1159F MED LIST DOCD IN RCRD: CPT | Mod: CPTII,S$GLB,, | Performed by: UROLOGY

## 2025-09-03 PROCEDURE — G2211 COMPLEX E/M VISIT ADD ON: HCPCS | Mod: S$GLB,,, | Performed by: UROLOGY

## 2025-09-03 PROCEDURE — 1101F PT FALLS ASSESS-DOCD LE1/YR: CPT | Mod: CPTII,S$GLB,, | Performed by: UROLOGY

## 2025-09-03 PROCEDURE — 99999 PR PBB SHADOW E&M-EST. PATIENT-LVL III: CPT | Mod: PBBFAC,,, | Performed by: UROLOGY

## 2025-09-03 RX ORDER — FINASTERIDE 5 MG/1
5 TABLET, FILM COATED ORAL DAILY
Qty: 90 TABLET | Refills: 3 | Status: SHIPPED | OUTPATIENT
Start: 2025-09-03 | End: 2026-09-03

## (undated) DEVICE — LINER SUCTION 3000CC

## (undated) DEVICE — APPLIER CLIP EPIX UNIV 5X34

## (undated) DEVICE — PACK CUSTOM ENDO CHOLO SLI

## (undated) DEVICE — SOL WATER STRL IRR 1000ML

## (undated) DEVICE — SLEEVE TROCAR 5MMX100MM  CTS02

## (undated) DEVICE — UNDERGLOVE BIOGEL PI MICRO BLUE SZ 7.5

## (undated) DEVICE — STRAP OR TABLE 5IN X 72IN

## (undated) DEVICE — SOL NACL IRR 1000ML BTL

## (undated) DEVICE — SCISSOR 5MMX35CM DIRECT DRIVE

## (undated) DEVICE — ADHESIVE MASTISOL VIAL 48/BX

## (undated) DEVICE — TUBING ARTHRO IRR 4-LEAD

## (undated) DEVICE — DRESSING TEGADERM 4X4 3/4 TD1004

## (undated) DEVICE — SPONGE LAP 18X18

## (undated) DEVICE — COVER TABLE 44X90 STERILE

## (undated) DEVICE — SCRUB DYNA-HEX LIQ 4% CHG 4OZ

## (undated) DEVICE — SLEEVE SCD EXPRESS KNEE MEDIUM

## (undated) DEVICE — SPONGE SUPER KERLIX 6X6.75IN

## (undated) DEVICE — GLOVE SENSICARE PI ALOE 6.5

## (undated) DEVICE — SYR 10CC LUER LOCK

## (undated) DEVICE — SET CYSTO IRR DRP CHMBR 84IN

## (undated) DEVICE — BAG LINGEMAN DRAIN UROLOGY

## (undated) DEVICE — CLOSURE SKIN STERI STRIP 1/2X4

## (undated) DEVICE — Device

## (undated) DEVICE — KIT ANTIFOG

## (undated) DEVICE — ELECTRODE RESECTION LOOP LRGE

## (undated) DEVICE — RELOAD LINEAR TCR75

## (undated) DEVICE — SUTURE MONOCRYL 4-0 PS-2 27 MCP426H

## (undated) DEVICE — SOL POVIDONE PREP IODINE 4 OZ

## (undated) DEVICE — SOLUTION H2O IRRIGATION 3000ML R8006

## (undated) DEVICE — ELECTRODE BLADE INSULATED 1 IN

## (undated) DEVICE — COVER TRANSDUCER LATEX N/STERI

## (undated) DEVICE — BAG URINARY DRN 2000ML

## (undated) DEVICE — SPONGE BULKEE II ABSRB 6X6.75

## (undated) DEVICE — LEGGING CLEAR POLY 2/PACK

## (undated) DEVICE — UNDERGLOVES BIOGEL PI SZ 6 LF

## (undated) DEVICE — BAG TISS RETRV MONARCH 10MM

## (undated) DEVICE — SUTURE ETHILON 2-0 PS 18 585H

## (undated) DEVICE — GOWN B1 X-LG X-LONG

## (undated) DEVICE — SOLUTION IRRI H2O BOTTLE 1000ML

## (undated) DEVICE — DISSECTOR CURVED 5DCD

## (undated) DEVICE — TRAY GENERAL LAPAROSCOPY

## (undated) DEVICE — SUTURE SILK 3-0 CR SH 30 C017D

## (undated) DEVICE — APPLICATOR CHLORAPREP ORN 26ML

## (undated) DEVICE — TROCAR ENDO Z THREAD KII 5X100

## (undated) DEVICE — CONTAINER SPECIMEN OR STER 4OZ

## (undated) DEVICE — RETRIEVER ENDOPOUCH SPEC BAG

## (undated) DEVICE — GOWN X-LG STERILE BACK

## (undated) DEVICE — TROCAR OPTICAL ZTHREAD 5MMX100MM CTF03

## (undated) DEVICE — ELECTRODE REM PLYHSV RETURN 9

## (undated) DEVICE — SYR 50ML CATH TIP

## (undated) DEVICE — DRAPE MEDIUM SHEET 40X70IN

## (undated) DEVICE — BULB DRAIN 100CC 70740

## (undated) DEVICE — SEE MEDLINE ITEM 157117

## (undated) DEVICE — BAG INZII TISS RETRV 12/15MM

## (undated) DEVICE — GLOVE SURG ULTRA TOUCH 7.5

## (undated) DEVICE — GOWN POLY REINF X-LONG XL

## (undated) DEVICE — SPONGE COTTON TRAY 4X4IN

## (undated) DEVICE — SOL NACL IRR 3000ML

## (undated) DEVICE — SOL IRR NACL .9% 3000ML

## (undated) DEVICE — TROCAR BALL. BLNT HASSON C0R47

## (undated) DEVICE — SOL 9P NACL IRR PIC IL

## (undated) DEVICE — ELECTRODE RESECTION BUTTON LRG

## (undated) DEVICE — CUTTER LINEAR TLC75

## (undated) DEVICE — DRAPE MINOR FEN 98X77X121IN

## (undated) DEVICE — SPONGE DRAIN 4X4 441407

## (undated) DEVICE — BOWL STERILE LARGE 32OZ

## (undated) DEVICE — SUCTION POOLE 0035040

## (undated) DEVICE — IRRIGATOR SUCTION W/TIP

## (undated) DEVICE — LIGASURE MARYLAND LF1937 REPLACES LF1737

## (undated) DEVICE — STAPLER SKIN NON ROTATE PXW35

## (undated) DEVICE — LUBRICANT SURGILUBE 2 OZ

## (undated) DEVICE — CLIPPER BLADE MOD 4406 (CAREF)

## (undated) DEVICE — GLOVE SURGEONS ULTRA TOUCH 6.5

## (undated) DEVICE — GLOVE SENSICARE PI ALOE 7.5

## (undated) DEVICE — SYR 50CC LL

## (undated) DEVICE — SUT MONOCRYL 4-0 PS-2

## (undated) DEVICE — SUT 0 VICRYL / UR6 (J603)

## (undated) DEVICE — SEE MEDLINE ITEM 157185

## (undated) DEVICE — TOWEL OR DISP STRL BLUE 4/PK

## (undated) DEVICE — SCRUB HIBICLENS 4% CHG 4OZ

## (undated) DEVICE — SUCTION/IRRIGATOR W/TIP

## (undated) DEVICE — DRAPE CYSTOSCOPY LARGE

## (undated) DEVICE — SOLUTION IRRI NS BOTTLE 1000ML R5200-01

## (undated) DEVICE — PAD BOVIE ADULT

## (undated) DEVICE — TROCAR ENDOPATH XCEL 5X100MM

## (undated) DEVICE — NDL SAFETY 21G X 1 1/2 ECLPSE

## (undated) DEVICE — TROCAR KII BLLN 12MM 10CM

## (undated) DEVICE — SET TUBE PNEUMOCLEAR SE HI FLO

## (undated) DEVICE — SEE MEDLINE ITEM 152487

## (undated) DEVICE — SUTURE PDS 1 TP-1 48 PDP880G

## (undated) DEVICE — TIP BOVIE 4 0014A

## (undated) DEVICE — DRESSING POST OP MEPILEX  AG  4X12

## (undated) DEVICE — SUTURE VICRYL #0 27 UR-6 VCP603H